# Patient Record
Sex: MALE | Race: WHITE | NOT HISPANIC OR LATINO | Employment: OTHER | URBAN - NONMETROPOLITAN AREA
[De-identification: names, ages, dates, MRNs, and addresses within clinical notes are randomized per-mention and may not be internally consistent; named-entity substitution may affect disease eponyms.]

---

## 2017-01-23 ENCOUNTER — OFFICE VISIT (OUTPATIENT)
Dept: MEDICAL GROUP | Facility: CLINIC | Age: 67
End: 2017-01-23
Payer: MEDICARE

## 2017-01-23 VITALS
OXYGEN SATURATION: 94 % | SYSTOLIC BLOOD PRESSURE: 150 MMHG | DIASTOLIC BLOOD PRESSURE: 92 MMHG | RESPIRATION RATE: 16 BRPM | HEIGHT: 72 IN | HEART RATE: 72 BPM | BODY MASS INDEX: 19.77 KG/M2 | WEIGHT: 146 LBS | TEMPERATURE: 98.4 F

## 2017-01-23 DIAGNOSIS — I67.2 CEREBRAL ATHEROSCLEROSIS: ICD-10-CM

## 2017-01-23 DIAGNOSIS — M54.12 CERVICAL RADICULOPATHY: ICD-10-CM

## 2017-01-23 DIAGNOSIS — Z72.0 TOBACCO ABUSE: ICD-10-CM

## 2017-01-23 PROCEDURE — 4040F PNEUMOC VAC/ADMIN/RCVD: CPT | Mod: 8P | Performed by: FAMILY MEDICINE

## 2017-01-23 PROCEDURE — 4004F PT TOBACCO SCREEN RCVD TLK: CPT | Performed by: FAMILY MEDICINE

## 2017-01-23 PROCEDURE — 1101F PT FALLS ASSESS-DOCD LE1/YR: CPT | Performed by: FAMILY MEDICINE

## 2017-01-23 PROCEDURE — G8432 DEP SCR NOT DOC, RNG: HCPCS | Performed by: FAMILY MEDICINE

## 2017-01-23 PROCEDURE — G8419 CALC BMI OUT NRM PARAM NOF/U: HCPCS | Performed by: FAMILY MEDICINE

## 2017-01-23 PROCEDURE — 3017F COLORECTAL CA SCREEN DOC REV: CPT | Mod: 8P | Performed by: FAMILY MEDICINE

## 2017-01-23 PROCEDURE — G8484 FLU IMMUNIZE NO ADMIN: HCPCS | Performed by: FAMILY MEDICINE

## 2017-01-23 PROCEDURE — 99214 OFFICE O/P EST MOD 30 MIN: CPT | Performed by: FAMILY MEDICINE

## 2017-01-23 RX ORDER — LOVASTATIN 20 MG/1
20 TABLET ORAL DAILY
Qty: 30 TAB | Refills: 6 | Status: SHIPPED | OUTPATIENT
Start: 2017-01-23 | End: 2020-02-10

## 2017-01-23 ASSESSMENT — ENCOUNTER SYMPTOMS
GASTROINTESTINAL NEGATIVE: 1
NECK PAIN: 0
COUGH: 0
CARDIOVASCULAR NEGATIVE: 1
CONSTITUTIONAL NEGATIVE: 1
FEVER: 0
HEADACHES: 0
MUSCULOSKELETAL NEGATIVE: 1
DIZZINESS: 0
CONSTIPATION: 0
PSYCHIATRIC NEGATIVE: 1
PALPITATIONS: 0
RESPIRATORY NEGATIVE: 1
EYES NEGATIVE: 1
HEMOPTYSIS: 0
FOCAL WEAKNESS: 1
MYALGIAS: 0
CHILLS: 0

## 2017-01-23 NOTE — MR AVS SNAPSHOT
Kobe Morris   2017 1:30 PM   Office Visit   MRN: [id number]    Department:  [facility] Phone:  [phone]    Description:  Male : 1950   Provider:  Bryce Carter M.D.           Reason for Visit     Results MRI of chest, brain, spinal cord       Allergies as of 2017     No Known Allergies      You were diagnosed with     Cervical radiculopathy   [212828]       Cerebral atherosclerosis   [437.0.ICD-9-CM]       Tobacco abuse   [651361]         Vital Signs     Blood Pressure Pulse Temperature Respirations Height Weight    150/92 mmHg 72 36.9 °C (98.4 °F) 16 1.829 m (6') 66.225 kg (146 lb)    Body Mass Index Oxygen Saturation Smoking Status             19.80 kg/m2 94% Current Every Day Smoker         Basic Information     Date Of Birth Sex Race Ethnicity Preferred Language    1950 Male White Non- English      Problem List              ICD-10-CM Priority Class Noted - Resolved    Tobacco abuse Z72.0   Unknown - Present      Health Maintenance        Date Due Completion Dates    IMM DTaP/Tdap/Td Vaccine (1 - Tdap) 1970 ---    COLONOSCOPY 2001 ---    IMM ZOSTER VACCINE 2011 ---    IMM PNEUMOCOCCAL 65+ (ADULT) LOW/MEDIUM RISK SERIES (1 of 2 - PCV13) 2016 ---    IMM INFLUENZA (1) 2016 ---            Current Immunizations     No immunizations on file.      Below and/or attached are the medications your provider expects you to take. Review all of your home medications and newly ordered medications with your provider and/or pharmacist. Follow medication instructions as directed by your provider and/or pharmacist. Please keep your medication list with you and share with your provider. Update the information when medications are discontinued, doses are changed, or new medications (including over-the-counter products) are added; and carry medication information at all times in the event of emergency situations     Allergies:  No Known Allergies          Medications  Valid as of: January 23, 2017 -  3:20 PM    Generic Name Brand Name Tablet Size Instructions for use    Aspirin (Tablet Delayed Response) ECOTRIN 325 MG Take 1 Tab by mouth every day.        Azithromycin (Tab) ZITHROMAX 250 MG Take 2 tablets by mouth on day one. Take one tablet by mouth the remaining days until gone        Clarithromycin (Tab) BIAXIN 500 MG Take 1 Tab by mouth 2 times a day.        Lovastatin (Tab) MEVACOR 20 MG Take 1 Tab by mouth every day.        MethylPREDNISolone (Tab) MEDROL DOSPACK 4 MG Take  by mouth. Use as directed        Varenicline Tartrate (Misc) CHANTIX MI 0.5 MG X 11 & 1 MG X 42 As dir        .                 Medicines prescribed today were sent to:     [facility] - [place], [place] - [address] [place] [address]    [address] [facility] [address] [facility] [address]    Phone: [phone] Fax: [phone]    Open 24 Hours?: No      Medication refill instructions:       If your prescription bottle indicates you have medication refills left, it is not necessary to call your provider’s office. Please contact your pharmacy and they will refill your medication.    If your prescription bottle indicates you do not have any refills left, you may request refills at any time through one of the following ways: The online Cause.it system (except Urgent Care), by calling your provider’s office, or by asking your pharmacy to contact your provider’s office with a refill request. Medication refills are processed only during regular business hours and may not be available until the next business day. Your provider may request additional information or to have a follow-up visit with you prior to refilling your medication.   *Please Note: Medication refills are assigned a new Rx number when refilled electronically. Your pharmacy may indicate that no refills were authorized even though a new prescription for the same medication is available at the pharmacy. Please request the medicine by  name with the pharmacy before contacting your provider for a refill.        Referral     A referral request has been sent to our patient care coordination department. Please allow 3-5 business days for us to process this request and contact you either by phone or mail. If you do not hear from us by the 5th business day, please call us at [phone].           KSE Access Code: [email or web address]  Expires: 1/27/2017  9:02 AM    KSE  A secure, online tool to manage your health information     [employer]’s KSE® is a secure, online tool that connects you to your personalized health information from the privacy of your home -- day or night - making it very easy for you to manage your healthcare. Once the activation process is completed, you can even access your medical information using the KSE prince, which is available for free in the Apple Prince store or Google Play store.     KSE provides the following levels of access (as shown below):   My Chart Features   [facility] Primary Care Doctor [facility]  Specialists [facility]  [facility]  Primary Care  Doctor   Email your healthcare team securely and privately 24/7 X X X    Manage appointments: schedule your next appointment; view details of past/upcoming appointments X      Request prescription refills. X      View recent personal medical records, including lab and immunizations X X X X   View health record, including health history, allergies, medications X X X X   Read reports about your outpatient visits, procedures, consult and ER notes X X X X   See your discharge summary, which is a recap of your hospital and/or ER visit that includes your diagnosis, lab results, and care plan. X X       How to register for KSE:  1. Go to  [email or web address].  2. Click on the Sign Up Now box, which takes you to the New Member Sign Up page. You will need to provide the following information:  a. Enter your KSE Access Code exactly as it appears  at the top of this page. (You will not need to use this code after you’ve completed the sign-up process. If you do not sign up before the expiration date, you must request a new code.)   b. Enter your date of birth.   c. Enter your home email address.   d. Click Submit, and follow the next screen’s instructions.  3. Create a Pairin ID. This will be your Pairin login ID and cannot be changed, so think of one that is secure and easy to remember.  4. Create a Pairin password. You can change your password at any time.  5. Enter your Password Reset Question and Answer. This can be used at a later time if you forget your password.   6. Enter your e-mail address. This allows you to receive e-mail notifications when new information is available in Pairin.  7. Click Sign Up. You can now view your health information.    For assistance activating your Pairin account, call [phone]

## 2017-01-23 NOTE — PROGRESS NOTES
Subjective:      Kobe Morris is a 66 y.o. male who presents with Results            HPI Comments: 1. Cervical radiculopathy  Weakness in arm explained by spinal stenosis and hnp in mri will send to pmr for eval  - REFERRAL TO PHYSIATRY (PMR)    2. Cerebral atherosclerosis  Seen on mri, due to smoking, will start statin but needs to stop smoking  - lovastatin (MEVACOR) 20 MG Tab; Take 1 Tab by mouth every day.  Dispense: 30 Tab; Refill: 6  - aspirin EC (ECOTRIN) 325 MG Tablet Delayed Response; Take 1 Tab by mouth every day.  Dispense: 30 Tab; Refill: 6    3. Tobacco abuse  Patient is still currently using tobacco. They were counseled on the importance of cessation and various behavioural and pharmacological ways of achieving this.  UNCONTROLLED  Will try chantix    Past Medical History:    Tobacco abuse                                               History reviewed. No pertinent surgical history.    Smoking Status: Current Every Day Smoker        Packs/Day: 1.00  Years: 45         Types: Cigarettes    Smokeless Status: Never Used                        Alcohol Use: Yes           1.0 oz/week       2 Cans of beer per week    Review of patient's family history indicates:    Cancer                         Father                      Current outpatient prescriptions: •  lovastatin (MEVACOR) 20 MG Tab, Take 1 Tab by mouth every day., Disp: 30 Tab, Rfl: 6•  aspirin EC (ECOTRIN) 325 MG Tablet Delayed Response, Take 1 Tab by mouth every day., Disp: 30 Tab, Rfl: 6•  varenicline (CHANTIX STARTING MONTH PAK) 0.5 MG X 11 & 1 MG X 42 tablet, As dir, Disp: 56 Tab, Rfl: 3•  azithromycin (ZITHROMAX) 250 MG Tab, Take 2 tablets by mouth on day one. Take one tablet by mouth the remaining days until gone (Patient not taking: Reported on 12/19/2016), Disp: 6 Tab, Rfl: 0•  clarithromycin (BIAXIN) 500 MG TABS, Take 1 Tab by mouth 2 times a day. (Patient not taking: Reported on 12/19/2016), Disp: 20 Each, Rfl: 0•  methylPREDNISolone (MEDROL  DOSPACK) 4 MG TABS, Take  by mouth. Use as directed (Patient not taking: Reported on 12/19/2016), Disp: 1 Each, Rfl: 0    Assessment/plan: diagnoses listed as above in problem list. Patient will continue with current medications/diet/lifestyle modifications    Patient will continue with current medication regimen, advised on taking meds every day, f/u in 3 mo.            Review of Systems   Constitutional: Negative.  Negative for fever and chills.        Past Medical History:    Tobacco abuse                                               History reviewed. No pertinent surgical history.    Smoking Status: Current Every Day Smoker        Packs/Day: 1.00  Years: 45        Types: Cigarettes    Smokeless Status: Never Used                        Alcohol Use: Yes           1.0 oz/week       2 Cans of beer per week    Review of patient's family history indicates:    Cancer                         Father                     HENT: Negative.    Eyes: Negative.    Respiratory: Negative.  Negative for cough and hemoptysis.    Cardiovascular: Negative.  Negative for chest pain and palpitations.   Gastrointestinal: Negative.  Negative for constipation.   Genitourinary: Negative.  Negative for dysuria and urgency.   Musculoskeletal: Negative.  Negative for myalgias and neck pain.   Skin: Negative.  Negative for rash.   Neurological: Positive for focal weakness. Negative for dizziness and headaches.   Endo/Heme/Allergies: Negative.    Psychiatric/Behavioral: Negative.  Negative for suicidal ideas.          Objective:     /92 mmHg  Pulse 72  Temp(Src) 36.9 °C (98.4 °F)  Resp 16  Ht 1.829 m (6')  Wt 66.225 kg (146 lb)  BMI 19.80 kg/m2  SpO2 94%     Physical Exam   Constitutional: He is oriented to person, place, and time. No distress.   HENT:   Head: Normocephalic and atraumatic.   Right Ear: External ear normal.   Left Ear: External ear normal.   Nose: Nose normal.   Mouth/Throat: Oropharynx is clear and moist. No  oropharyngeal exudate.   Eyes: Pupils are equal, round, and reactive to light. Right eye exhibits no discharge. Left eye exhibits no discharge. No scleral icterus.   Neck: Normal range of motion. Neck supple. No JVD present. No tracheal deviation present. No thyromegaly present.   Cardiovascular: Normal rate, regular rhythm, normal heart sounds and intact distal pulses.  Exam reveals no gallop and no friction rub.    No murmur heard.  Pulmonary/Chest: Effort normal and breath sounds normal. No stridor. No respiratory distress. He has no wheezes. He has no rales. He exhibits no tenderness.   Abdominal: Soft. He exhibits no distension. There is no tenderness.   Musculoskeletal:   Left upper arm weakness present   Lymphadenopathy:     He has no cervical adenopathy.   Neurological: He is alert and oriented to person, place, and time.   Skin: Skin is warm and dry. He is not diaphoretic.   Psychiatric: Judgment normal.   Nursing note and vitals reviewed.              Assessment/Plan:     1. Cervical radiculopathy    - REFERRAL TO PHYSIATRY (PMR)    2. Cerebral atherosclerosis    - lovastatin (MEVACOR) 20 MG Tab; Take 1 Tab by mouth every day.  Dispense: 30 Tab; Refill: 6  - aspirin EC (ECOTRIN) 325 MG Tablet Delayed Response; Take 1 Tab by mouth every day.  Dispense: 30 Tab; Refill: 6    3. Tobacco abuse

## 2017-03-08 ENCOUNTER — OFFICE VISIT (OUTPATIENT)
Dept: URGENT CARE | Facility: PHYSICIAN GROUP | Age: 67
End: 2017-03-08
Payer: MEDICARE

## 2017-03-08 VITALS
SYSTOLIC BLOOD PRESSURE: 138 MMHG | WEIGHT: 144.4 LBS | HEART RATE: 84 BPM | TEMPERATURE: 98.8 F | RESPIRATION RATE: 12 BRPM | OXYGEN SATURATION: 96 % | BODY MASS INDEX: 19.58 KG/M2 | DIASTOLIC BLOOD PRESSURE: 70 MMHG

## 2017-03-08 DIAGNOSIS — J22 LOWER RESP. TRACT INFECTION: ICD-10-CM

## 2017-03-08 DIAGNOSIS — R06.2 WHEEZE: ICD-10-CM

## 2017-03-08 PROCEDURE — 3017F COLORECTAL CA SCREEN DOC REV: CPT | Mod: 8P | Performed by: PHYSICIAN ASSISTANT

## 2017-03-08 PROCEDURE — 4040F PNEUMOC VAC/ADMIN/RCVD: CPT | Mod: 8P | Performed by: PHYSICIAN ASSISTANT

## 2017-03-08 PROCEDURE — 99214 OFFICE O/P EST MOD 30 MIN: CPT | Performed by: PHYSICIAN ASSISTANT

## 2017-03-08 PROCEDURE — G8419 CALC BMI OUT NRM PARAM NOF/U: HCPCS | Performed by: PHYSICIAN ASSISTANT

## 2017-03-08 PROCEDURE — G8432 DEP SCR NOT DOC, RNG: HCPCS | Performed by: PHYSICIAN ASSISTANT

## 2017-03-08 PROCEDURE — 1101F PT FALLS ASSESS-DOCD LE1/YR: CPT | Performed by: PHYSICIAN ASSISTANT

## 2017-03-08 PROCEDURE — G8484 FLU IMMUNIZE NO ADMIN: HCPCS | Performed by: PHYSICIAN ASSISTANT

## 2017-03-08 PROCEDURE — 4004F PT TOBACCO SCREEN RCVD TLK: CPT | Performed by: PHYSICIAN ASSISTANT

## 2017-03-08 RX ORDER — METHYLPREDNISOLONE 4 MG/1
TABLET ORAL
Qty: 21 TAB | Refills: 0 | Status: SHIPPED | OUTPATIENT
Start: 2017-03-08 | End: 2017-05-30

## 2017-03-08 RX ORDER — AZITHROMYCIN 250 MG/1
TABLET, FILM COATED ORAL
Qty: 6 TAB | Refills: 0 | Status: SHIPPED | OUTPATIENT
Start: 2017-03-08 | End: 2017-05-30

## 2017-03-08 RX ORDER — CODEINE PHOSPHATE AND GUAIFENESIN 10; 100 MG/5ML; MG/5ML
5 SOLUTION ORAL NIGHTLY PRN
Qty: 120 ML | Refills: 0 | Status: SHIPPED | OUTPATIENT
Start: 2017-03-08 | End: 2017-07-26

## 2017-03-08 NOTE — MR AVS SNAPSHOT
Kobe Morris   3/8/2017 10:20 AM   Office Visit   MRN: [id number]    Department:  [facility]   Dept Phone:  [phone]    Description:  Male : 1950   Provider:  Cristina Rodriguez PA-C           Reason for Visit     Cough           Allergies as of 3/8/2017     No Known Allergies      You were diagnosed with     Lower resp. tract infection   [265039]       Wheeze   [591266]         Vital Signs     Blood Pressure Pulse Temperature Respirations Weight Oxygen Saturation    138/70 mmHg 84 37.1 °C (98.8 °F) 12 65.499 kg (144 lb 6.4 oz) 96%    Smoking Status                   Current Every Day Smoker           Basic Information     Date Of Birth Sex Race Ethnicity Preferred Language    1950 Male White Non- English      Your appointments     May 30, 2017 10:15 AM   New Patient with Gabino Marcus M.D.   [facility]YSIAT[facility] (--)    [address]  [place] [id number] [phone]           Please bring Photo ID, Insurance Cards, All Medication Bottles and copies of any legal documents (such as Living Will, Power of ) If speaking a language besides English please bring an adult . Please arrive 30 minutes prior for check in and registration. You will be receiving a confirmation call a few days before your appointment from our automated call confirmation system.              Problem List              ICD-10-CM Priority Class Noted - Resolved    Tobacco abuse Z72.0   Unknown - Present      Health Maintenance        Date Due Completion Dates    IMM DTaP/Tdap/Td Vaccine (1 - Tdap) 1970 ---    COLONOSCOPY 2001 ---    IMM ZOSTER VACCINE 2011 ---    IMM PNEUMOCOCCAL 65+ (ADULT) LOW/MEDIUM RISK SERIES (1 of 2 - PCV13) 2016 ---    IMM INFLUENZA (1) 2016 ---            Current Immunizations     No immunizations on file.      Below and/or attached are the medications your provider expects you to take. Review all of your home  medications and newly ordered medications with your provider and/or pharmacist. Follow medication instructions as directed by your provider and/or pharmacist. Please keep your medication list with you and share with your provider. Update the information when medications are discontinued, doses are changed, or new medications (including over-the-counter products) are added; and carry medication information at all times in the event of emergency situations     Allergies:  No Known Allergies          Medications  Valid as of: March 08, 2017 -  1:51 PM    Generic Name Brand Name Tablet Size Instructions for use    Aspirin (Tablet Delayed Response) ECOTRIN 325 MG Take 1 Tab by mouth every day.        Azithromycin (Tab) ZITHROMAX 250 MG Take 2 tablets by mouth on day one. Take one tablet by mouth the remaining days until gone        Azithromycin (Tab) ZITHROMAX 250 MG Use as package directs        Clarithromycin (Tab) BIAXIN 500 MG Take 1 Tab by mouth 2 times a day.        Guaifenesin-Codeine (Solution) ROBITUSSIN -10 mg/5mL Take 5 mL by mouth at bedtime as needed for Cough.        Lovastatin (Tab) MEVACOR 20 MG Take 1 Tab by mouth every day.        MethylPREDNISolone (Tab) MEDROL DOSPACK 4 MG Take  by mouth. Use as directed        MethylPREDNISolone (Tablet Therapy Pack) MEDROL DOSEPAK 4 MG Use as package directs        Varenicline Tartrate (Misc) CHANTIX MI 0.5 MG X 11 & 1 MG X 42 As dir        .                 Medicines prescribed today were sent to:     [facility] [address] [place] [address] - [address] [place] [address]    Phone: [phone] Fax: [phone]    Open 24 Hours?: No      Medication refill instructions:       If your prescription bottle indicates you have medication refills left, it is not necessary to call your provider’s office. Please contact your pharmacy and they will refill your medication.    If your prescription bottle indicates you do not have  any refills left, you may request refills at any time through one of the following ways: The online NetManage system (except Urgent Care), by calling your provider’s office, or by asking your pharmacy to contact your provider’s office with a refill request. Medication refills are processed only during regular business hours and may not be available until the next business day. Your provider may request additional information or to have a follow-up visit with you prior to refilling your medication.   *Please Note: Medication refills are assigned a new Rx number when refilled electronically. Your pharmacy may indicate that no refills were authorized even though a new prescription for the same medication is available at the pharmacy. Please request the medicine by name with the pharmacy before contacting your provider for a refill.           NetManage Access Code: [email or web address]  Expires: 3/26/2017 10:30 AM    NetManage  A secure, online tool to manage your health information     [employer]’s NetManage® is a secure, online tool that connects you to your personalized health information from the privacy of your home -- day or night - making it very easy for you to manage your healthcare. Once the activation process is completed, you can even access your medical information using the NetManage prince, which is available for free in the Apple Prince store or Google Play store.     NetManage provides the following levels of access (as shown below):   My Chart Features   [facility] Primary Care Doctor [facility]  Specialists [facility]  Urgent  Care Non-[facility]  Primary Care  Doctor   Email your healthcare team securely and privately 24/7 X X X    Manage appointments: schedule your next appointment; view details of past/upcoming appointments X      Request prescription refills. X      View recent personal medical records, including lab and immunizations X X X X   View health record, including health history, allergies, medications X X X X   Read  reports about your outpatient visits, procedures, consult and ER notes X X X X   See your discharge summary, which is a recap of your hospital and/or ER visit that includes your diagnosis, lab results, and care plan. X X       How to register for [email or web address]:  1. Go to  [email or web address].  2. Click on the Sign Up Now box, which takes you to the New Member Sign Up page. You will need to provide the following information:  a. Enter your Lovestruck.com Access Code exactly as it appears at the top of this page. (You will not need to use this code after you’ve completed the sign-up process. If you do not sign up before the expiration date, you must request a new code.)   b. Enter your date of birth.   c. Enter your home email address.   d. Click Submit, and follow the next screen’s instructions.  3. Create a Lovestruck.com ID. This will be your Lovestruck.com login ID and cannot be changed, so think of one that is secure and easy to remember.  4. Create a Lovestruck.com password. You can change your password at any time.  5. Enter your Password Reset Question and Answer. This can be used at a later time if you forget your password.   6. Enter your e-mail address. This allows you to receive e-mail notifications when new information is available in Lovestruck.com.  7. Click Sign Up. You can now view your health information.    For assistance activating your Lovestruck.com account, call [phone]        Quit Tobacco Information     Do you want to quit using tobacco?    Quitting tobacco decreases risks of cancer, heart and lung disease, increases life expectancy, improves sense of taste and smell, and increases spending money, among other benefits.    If you are thinking about quitting, we can help.  • [facility] Quit Tobacco Program: 772.338.1940  o Program occurs weekly for four weeks and includes pharmacist consultation on products to support quitting smoking or chewing tobacco. A provider referral is needed for pharmacist consultation.  • Tobacco Users  Help Hotline: -800-QUIT-NOW (019-0271) or https://[place].quitlogix.org/  o Free, confidential telephone and online coaching for [place] residents. Sessions are designed on a schedule that is convenient for you. Eligible clients receive free nicotine replacement therapy.  • Nationally: www.smokefree.gov  o Information and professional assistance to support both immediate and long-term needs as you become, and remain, a non-smoker. Smokefree.gov allows you to choose the help that best fits your needs.

## 2017-03-08 NOTE — PROGRESS NOTES
Chief Complaint   Patient presents with   • Cough       HISTORY OF PRESENT ILLNESS: Patient is a 66 y.o. male who presents today for evaluation of a productive cough for the last week to 10 days. Patient reports shortness of breath and occasional chills but denies overt fever. He reports mild nasal congestion but denies headache, ear pain, sore throat, nausea, vomiting. He has not taken any over-the-counter medication. He has had a difficult time sleeping because of the cough. He has been a 2 pack per day smoker for most of his life but is trying to quit and is down to 4 cigarettes a day.    Patient Active Problem List    Diagnosis Date Noted   • Tobacco abuse        Allergies:Review of patient's allergies indicates no known allergies.    Current Outpatient Prescriptions Ordered in [facility]   Medication Sig Dispense Refill   • azithromycin (ZITHROMAX) 250 MG Tab Use as package directs 6 Tab 0   • guaifenesin-codeine (ROBITUSSIN AC) Solution oral solution Take 5 mL by mouth at bedtime as needed for Cough. 120 mL 0   • MethylPREDNISolone (MEDROL DOSEPAK) 4 MG Tablet Therapy Pack Use as package directs 21 Tab 0   • lovastatin (MEVACOR) 20 MG Tab Take 1 Tab by mouth every day. 30 Tab 6   • aspirin EC (ECOTRIN) 325 MG Tablet Delayed Response Take 1 Tab by mouth every day. 30 Tab 6   • varenicline (CHANTIX STARTING MONTH PAK) 0.5 MG X 11 & 1 MG X 42 tablet As dir 56 Tab 3   • azithromycin (ZITHROMAX) 250 MG Tab Take 2 tablets by mouth on day one. Take one tablet by mouth the remaining days until gone (Patient not taking: Reported on 12/19/2016) 6 Tab 0   • clarithromycin (BIAXIN) 500 MG TABS Take 1 Tab by mouth 2 times a day. (Patient not taking: Reported on 12/19/2016) 20 Each 0   • methylPREDNISolone (MEDROL DOSPACK) 4 MG TABS Take  by mouth. Use as directed (Patient not taking: Reported on 12/19/2016) 1 Each 0     No current [facility]-ordered facility-administered medications on file.       Past Medical History   Diagnosis Date    • Tobacco abuse        Social History   Substance Use Topics   • Smoking status: Current Every Day Smoker -- 1.00 packs/day for 45 years     Types: Cigarettes   • Smokeless tobacco: Never Used   • Alcohol Use: 1.0 oz/week     2 Cans of beer per week       Family Status   Relation Status Death Age   • Mother Alive    • Father     • Brother Alive      Family History   Problem Relation Age of Onset   • Cancer Father        ROS:   Review of Systems   Constitutional: Negative for fever, chills, weight loss and malaise/fatigue.   HENT: Negative for ear pain, nosebleeds, congestion, sore throat and neck pain.    Eyes: Negative for blurred vision.   Respiratory: Positive for cough, sputum production, shortness of breath and wheezing.    Cardiovascular: Negative for chest pain, palpitations, orthopnea and leg swelling.   Gastrointestinal: Negative for heartburn, nausea, vomiting and abdominal pain.   Genitourinary: Negative for dysuria, urgency and frequency.       Exam:  Blood pressure 138/70, pulse 84, temperature 37.1 °C (98.8 °F), resp. rate 12, weight 65.499 kg (144 lb 6.4 oz), SpO2 96 %.  General: Normal appearing. No distress.  HEENT: Conjunctiva clear, lids without ptosis, ears normal shape and contour, canals are clear bilaterally, tympanic membranes are benign, nasal mucosa benign, oropharynx is without erythema, edema or exudates.  Pulmonary: Diffuse inspiratory and expiratory wheezes.  Cardiovascular: Regular rate and rhythm without murmur.   Neurologic: Grossly nonfocal.  Lymph: No cervical lymphadenopathy noted.  Skin: No obvious lesions.  Psych: Normal mood. Alert and oriented x3. Judgment and insight is normal.    Declines nebulizer treatment    Assessment/Plan:  Discussed likely viral etiology but have advised starting antibiotics if his symptoms worsen after starting steroids or if they fail to improve after another week to 10 days. Discussed appropriate over-the-counter symptomatic medication,  and when to return to clinic. Contingent antibiotic prescription given to patient to fill upon meeting criteria of guidelines discussed.  Take all medications as directed. Follow up for worsening or persistent symptoms.  1. Lower resp. tract infection  azithromycin (ZITHROMAX) 250 MG Tab    guaifenesin-codeine (ROBITUSSIN AC) Solution oral solution   2. Wheeze  MethylPREDNISolone (MEDROL DOSEPAK) 4 MG Tablet Therapy Pack

## 2017-03-30 ENCOUNTER — OFFICE VISIT (OUTPATIENT)
Dept: MEDICAL GROUP | Facility: PHYSICIAN GROUP | Age: 67
End: 2017-03-30
Payer: MEDICARE

## 2017-03-30 VITALS
TEMPERATURE: 99.7 F | RESPIRATION RATE: 12 BRPM | SYSTOLIC BLOOD PRESSURE: 120 MMHG | OXYGEN SATURATION: 95 % | WEIGHT: 142 LBS | HEART RATE: 104 BPM | BODY MASS INDEX: 19.23 KG/M2 | HEIGHT: 72 IN | DIASTOLIC BLOOD PRESSURE: 80 MMHG

## 2017-03-30 DIAGNOSIS — J44.1 CHRONIC OBSTRUCTIVE PULMONARY DISEASE WITH ACUTE EXACERBATION (HCC): ICD-10-CM

## 2017-03-30 DIAGNOSIS — I67.89 CEREBRAL MICROVASCULAR DISEASE: ICD-10-CM

## 2017-03-30 DIAGNOSIS — K02.9 DENTAL CARIES: ICD-10-CM

## 2017-03-30 DIAGNOSIS — M48.9 CERVICAL SPINE DISEASE: ICD-10-CM

## 2017-03-30 DIAGNOSIS — Z72.0 TOBACCO ABUSE: ICD-10-CM

## 2017-03-30 PROCEDURE — 4004F PT TOBACCO SCREEN RCVD TLK: CPT | Performed by: INTERNAL MEDICINE

## 2017-03-30 PROCEDURE — 4040F PNEUMOC VAC/ADMIN/RCVD: CPT | Mod: 8P | Performed by: INTERNAL MEDICINE

## 2017-03-30 PROCEDURE — G8484 FLU IMMUNIZE NO ADMIN: HCPCS | Performed by: INTERNAL MEDICINE

## 2017-03-30 PROCEDURE — 3017F COLORECTAL CA SCREEN DOC REV: CPT | Mod: 8P | Performed by: INTERNAL MEDICINE

## 2017-03-30 PROCEDURE — 99204 OFFICE O/P NEW MOD 45 MIN: CPT | Performed by: INTERNAL MEDICINE

## 2017-03-30 PROCEDURE — G8432 DEP SCR NOT DOC, RNG: HCPCS | Performed by: INTERNAL MEDICINE

## 2017-03-30 PROCEDURE — G8420 CALC BMI NORM PARAMETERS: HCPCS | Performed by: INTERNAL MEDICINE

## 2017-03-30 PROCEDURE — 1101F PT FALLS ASSESS-DOCD LE1/YR: CPT | Performed by: INTERNAL MEDICINE

## 2017-03-30 RX ORDER — IPRATROPIUM BROMIDE AND ALBUTEROL SULFATE 2.5; .5 MG/3ML; MG/3ML
3 SOLUTION RESPIRATORY (INHALATION) EVERY 6 HOURS PRN
Qty: 120 VIAL | Refills: 5 | Status: SHIPPED | OUTPATIENT
Start: 2017-03-30 | End: 2017-03-30 | Stop reason: SDUPTHER

## 2017-03-30 RX ORDER — AMOXICILLIN AND CLAVULANATE POTASSIUM 875; 125 MG/1; MG/1
1 TABLET, FILM COATED ORAL 2 TIMES DAILY
Qty: 20 TAB | Refills: 0 | Status: SHIPPED | OUTPATIENT
Start: 2017-03-30 | End: 2017-05-30

## 2017-03-30 RX ORDER — IPRATROPIUM BROMIDE AND ALBUTEROL SULFATE 2.5; .5 MG/3ML; MG/3ML
3 SOLUTION RESPIRATORY (INHALATION) ONCE
Status: COMPLETED | OUTPATIENT
Start: 2017-03-30 | End: 2017-03-30

## 2017-03-30 RX ORDER — IPRATROPIUM BROMIDE AND ALBUTEROL SULFATE 2.5; .5 MG/3ML; MG/3ML
3 SOLUTION RESPIRATORY (INHALATION) EVERY 6 HOURS PRN
Qty: 120 VIAL | Refills: 5 | Status: SHIPPED
Start: 2017-03-30 | End: 2017-04-05 | Stop reason: SDUPTHER

## 2017-03-30 RX ADMIN — IPRATROPIUM BROMIDE AND ALBUTEROL SULFATE 3 ML: 2.5; .5 SOLUTION RESPIRATORY (INHALATION) at 11:53

## 2017-03-30 ASSESSMENT — PAIN SCALES - GENERAL: PAINLEVEL: NO PAIN

## 2017-03-30 NOTE — PATIENT INSTRUCTIONS
Stop smoking  chantix  augmentin 875 mg 1 2 x a day    duoneb treatments 2-4 x a day if sob    Cancer screening program.

## 2017-03-30 NOTE — PROGRESS NOTES
Chief Complaint   Patient presents with   • Cough     pt states cough, chest congestion and tighness, tired x1.5 months        HISTORY OF PRESENT ILLNESS: Patient is a 66 y.o. male established, new to me, patient who presents today to discuss the medical issues below.    Chronic obstructive pulmonary disease with acute exacerbation (CMS-HCC)  Patient here sob, cough, productive of globs of yellow, x about 1 mo, having fever and chills, not taking the temp.  Sleeping more recently, the cough is making it hard to sleep.  Has had sob with laying down 2 days ago, better last night but did awaken with cough, able to get back to sleep.  Seen in  3/8/17 rx zithromax, medrol dose eldon, states cough improved a bit but not a lot and has exacerbated.  Chest x-ray done in January positive for COPD. Negative infiltrate no masses. Recommendation for consideration for lung cancer screening made at that time. Patient reports over the last 4-5 years gradual and significant weight loss. He attributes this to the death of his wife. He is having night sweats as well currently but not prior to this recent exacerbation.     Tobacco abuse  Patient with 50 years or more of smoking, trying to cut back, down to 1/2 ppd.  Smokes when anxious, since age 16    Dental caries  Patient denies tooth pain.      Cervical spine disease  MRI positive multiple level disease, patient complains of left arm numbness but neg for neck pain, no weakness.     Cerebral microvascular disease  Patient has had MRI of the brain positive for microvascular disease. He currently is taking the aspirin 325 mg one a day and lovastatin 20 mg a day. He has no neuropathy complaints except for the left arm numbness.      Patient Active Problem List    Diagnosis Date Noted   • Chronic obstructive pulmonary disease with acute exacerbation (CMS-HCC) 03/30/2017   • Dental caries 03/30/2017   • Cervical spine disease 03/30/2017   • Cerebral microvascular disease 03/30/2017   •  Tobacco abuse        Allergies:Review of patient's allergies indicates no known allergies.    Current Outpatient Prescriptions   Medication Sig Dispense Refill   • amoxicillin-clavulanate (AUGMENTIN) 875-125 MG Tab Take 1 Tab by mouth 2 times a day. 20 Tab 0   • ipratropium-albuterol (DUONEB) 0.5-2.5 (3) MG/3ML nebulizer solution 3 mL by Nebulization route every 6 hours as needed for Shortness of Breath. 120 Vial 5   • lovastatin (MEVACOR) 20 MG Tab Take 1 Tab by mouth every day. 30 Tab 6   • aspirin EC (ECOTRIN) 325 MG Tablet Delayed Response Take 1 Tab by mouth every day. 30 Tab 6   • azithromycin (ZITHROMAX) 250 MG Tab Use as package directs (Patient not taking: Reported on 3/30/2017) 6 Tab 0   • guaifenesin-codeine (ROBITUSSIN AC) Solution oral solution Take 5 mL by mouth at bedtime as needed for Cough. (Patient not taking: Reported on 3/30/2017) 120 mL 0   • MethylPREDNISolone (MEDROL DOSEPAK) 4 MG Tablet Therapy Pack Use as package directs (Patient not taking: Reported on 3/30/2017) 21 Tab 0   • varenicline (CHANTIX STARTING MONTH PAK) 0.5 MG X 11 & 1 MG X 42 tablet As dir 56 Tab 3   • azithromycin (ZITHROMAX) 250 MG Tab Take 2 tablets by mouth on day one. Take one tablet by mouth the remaining days until gone (Patient not taking: Reported on 12/19/2016) 6 Tab 0   • clarithromycin (BIAXIN) 500 MG TABS Take 1 Tab by mouth 2 times a day. (Patient not taking: Reported on 12/19/2016) 20 Each 0   • methylPREDNISolone (MEDROL DOSPACK) 4 MG TABS Take  by mouth. Use as directed (Patient not taking: Reported on 12/19/2016) 1 Each 0     Current Facility-Administered Medications   Medication Dose Route Frequency Provider Last Rate Last Dose   • ipratropium-albuterol (DUONEB) nebulizer solution 3 mL  3 mL Nebulization Once Princess ELAM M.D.             Past Medical History   Diagnosis Date   • Tobacco abuse    • Chronic obstructive pulmonary disease with acute exacerbation (CMS-HCC) 3/30/2017   • Dental caries  3/30/2017   • Cervical spine disease 3/30/2017       Social History   Substance Use Topics   • Smoking status: Current Every Day Smoker -- 1.00 packs/day for 45 years     Types: Cigarettes   • Smokeless tobacco: Never Used   • Alcohol Use: 1.0 oz/week     2 Cans of beer per week       Family Status   Relation Status Death Age   • Mother Alive    • Father     • Brother Alive      Family History   Problem Relation Age of Onset   • Cancer Father      lung       ROS:    Cardiovascular: Negative for chest pain, palpitations, orthopnea, dyspnea with exertion or edema.   Gastrointestinal: Negative for GI upset, nausea, vomiting, abdominal pain, constipation or diarrhea.   Genitourinary: Negative for dysuria, urgency, hesitancy or frequency.       Exam:    Blood pressure 120/80, pulse 104, temperature 37.6 °C (99.7 °F), resp. rate 12, height 1.829 m (6'), weight 64.411 kg (142 lb), SpO2 95 %.  General: Slender, well developed male in NAD.  HENT: Normocephalic, bilateral TMs are intact, nasal and oral mucosa with no lesions,   Neck: Supple without bruit. Thyroid is not enlarged.  Pulmonary: Vesicular breath sounds moderately prolonged expiration right lung base with end expiratory wheezes no rhonchi rales  Cardiovascular: Regular rate and rhythm without murmur.   Abdomen: Normal bowel sounds soft and nontender no palpable liver spleen bladder mass.  Extremities: No LE edema noted.  Neuro: Grossly nonfocal.  Psych: Alert and oriented to person, place, and time. Appropriate mood and conversation.    Reviewed MRI cervical spine, brain, chest x-ray    This dictation was created using voice recognition software. I have made reasonable attempts to correct errors, however, errors of grammar and content may exist.          Assessment/Plan:    1. Chronic obstructive pulmonary disease with acute exacerbation (CMS-[facility])  Long discussion held most likely consistent with emphysema COPD. He did have good improvement with nebulizer  treatment here in the office now with fascicular breath sounds and no wheezes appreciated. X-ray is negative for any infiltrates done in January. Patient at high risk from the tobacco abuse referral to the lung cancer screening program. Considering the very poor dental condition will utilize an empiric course of antibiotics as Augmentin for anaerobic coverage. Monitor clinically, maximize air motion with DuoNeb discussed smoking implications.  - ipratropium-albuterol (DUONEB) nebulizer solution 3 mL; 3 mL by Nebulization route Once.  - DME NEBULIZER    2. Tobacco abuse  Patient did not utilize the Chantix out of concerns for the listed side effects. Long discussion held regarding indications of cigarette smoking, lung disease, use of Chantix signs and symptoms which would cause concern and he can discontinue the Chantix. Ultimately he opts for a trial Chantix to discontinue smoking.  - REFERRAL TO LUNG CANCER SCREENING PROGRAM    3. Dental caries  Very poor repair would recommend addressing with dentistry, monitor with course of antibiotics of Augmentin    4. Cervical spine disease  At baseline and consider additional intervention if continuing problematic. No neck pain primarily intermittent numbness    5. Cerebral microvascular disease  Discussed indications of the MRI positive for microvascular disease recommendations for ongoing statin aspirin and discontinuation of smoking.    Patient was seen for 45 minutes face to face of which more than 50% of the time was spent in counseling and coordination of care regarding the above problems.

## 2017-03-30 NOTE — ASSESSMENT & PLAN NOTE
Patient here sob, cough, productive of globs of yellow, x about 1 mo, having fever and chills, not taking the temp.  Sleeping more recently, the cough is making it hard to sleep.  Has had sob with laying down 2 days ago, better last night but did awaken with cough, able to get back to sleep.  Seen in  3/8/17 rx zithromax, medrol dose eldon, states cough improved a bit but not a lot and has exacerbated.  Chest x-ray done in January positive for COPD. Negative infiltrate no masses. Recommendation for consideration for lung cancer screening made at that time.

## 2017-03-30 NOTE — ASSESSMENT & PLAN NOTE
Patient with 50 years or more of smoking, trying to cut back, down to 1/2 ppd.  Smokes when anxious, since age 16

## 2017-03-30 NOTE — ASSESSMENT & PLAN NOTE
Patient has had MRI of the brain positive for microvascular disease. He currently is taking the aspirin 325 mg one a day and lovastatin 20 mg a day. He has no neuropathy complaints except for the left arm numbness.

## 2017-03-30 NOTE — ASSESSMENT & PLAN NOTE
MRI positive multiple level disease, patient complains of left arm numbness but neg for neck pain, no weakness.

## 2017-03-30 NOTE — MR AVS SNAPSHOT
Kobe Morris   3/30/2017 10:40 AM   Office Visit   MRN: [id number]    Department:  [facility]   Dept Phone:  [phone]    Description:  Male : 1950   Provider:  Princess ELAM M.D.           Reason for Visit     Cough pt states cough, chest congestion and tighness, tired x1.5 months       Allergies as of 3/30/2017     No Known Allergies      You were diagnosed with     Chronic obstructive pulmonary disease with acute exacerbation (CMS-[facility])   [734209]       Tobacco abuse   [596926]       Dental caries   [3144496]       Cervical spine disease   [808913]       Cerebral microvascular disease   [114828]         Vital Signs     Blood Pressure Pulse Temperature Respirations Height Weight    120/80 mmHg 104 37.6 °C (99.7 °F) 12 1.829 m (6') 64.411 kg (142 lb)    Body Mass Index Oxygen Saturation Smoking Status             19.25 kg/m2 95% Current Every Day Smoker         Basic Information     Date Of Birth Sex Race Ethnicity Preferred Language    1950 Male White Non- English      Your appointments     May 30, 2017 10:15 AM   New Patient with Gabino Marcus M.D.   [facility] (--)    [address]  [facility] [address] [phone]           Please bring Photo ID, Insurance Cards, All Medication Bottles and copies of any legal documents (such as Living Will, Power of ) If speaking a language besides English please bring an adult . Please arrive 30 minutes prior for check in and registration. You will be receiving a confirmation call a few days before your appointment from our automated call confirmation system.            2017  4:00 PM   Established Patient with Princess ELAM M.D.   [facility] (--)    [address]  [place] [address] [phone]           You will be receiving a confirmation call a few days before your appointment from our automated call confirmation system.              Problem List              ICD-10-CM Priority Class Noted - Resolved    Tobacco abuse Z72.0   Unknown - Present    Chronic obstructive pulmonary disease with acute exacerbation (CMS-HCC) J44.1   3/30/2017 - Present    Dental caries K02.9   3/30/2017 - Present    Cervical spine disease M48.9   3/30/2017 - Present    Cerebral microvascular disease I67.9   3/30/2017 - Present      Health Maintenance        Date Due Completion Dates    IMM DTaP/Tdap/Td Vaccine (1 - Tdap) 8/28/1970 ---    COLONOSCOPY 8/28/2001 ---    IMM ZOSTER VACCINE 8/28/2011 ---    IMM PNEUMOCOCCAL 65+ (ADULT) LOW/MEDIUM RISK SERIES (1 of 2 - PCV13) 8/28/2016 ---    IMM INFLUENZA (1) 9/1/2016 ---            Current Immunizations     No immunizations on file.      Below and/or attached are the medications your provider expects you to take. Review all of your home medications and newly ordered medications with your provider and/or pharmacist. Follow medication instructions as directed by your provider and/or pharmacist. Please keep your medication list with you and share with your provider. Update the information when medications are discontinued, doses are changed, or new medications (including over-the-counter products) are added; and carry medication information at all times in the event of emergency situations     Allergies:  No Known Allergies          Medications  Valid as of: March 30, 2017 - 11:49 AM    Generic Name Brand Name Tablet Size Instructions for use    Amoxicillin-Pot Clavulanate (Tab) AUGMENTIN 875-125 MG Take 1 Tab by mouth 2 times a day.        Aspirin (Tablet Delayed Response) ECOTRIN 325 MG Take 1 Tab by mouth every day.        Azithromycin (Tab) ZITHROMAX 250 MG Take 2 tablets by mouth on day one. Take one tablet by mouth the remaining days until gone        Azithromycin (Tab) ZITHROMAX 250 MG Use as package directs        Clarithromycin (Tab) BIAXIN 500 MG Take 1 Tab by mouth 2 times a day.        Guaifenesin-Codeine  (Solution) ROBITUSSIN -10 mg/5mL Take 5 mL by mouth at bedtime as needed for Cough.        Ipratropium-Albuterol (Solution) DUONEB 0.5-2.5 (3) MG/3ML 3 mL by Nebulization route every 6 hours as needed for Shortness of Breath.        Lovastatin (Tab) MEVACOR 20 MG Take 1 Tab by mouth every day.        MethylPREDNISolone (Tab) MEDROL DOSPACK 4 MG Take  by mouth. Use as directed        MethylPREDNISolone (Tablet Therapy Pack) MEDROL DOSEPAK 4 MG Use as package directs        Varenicline Tartrate (Misc) CHANTIX MI 0.5 MG X 11 & 1 MG X 42 As dir        .                 Medicines prescribed today were sent to:     [facility] [address] [facility] [address] [facility]    [address] [place] [address]    Phone: [phone] Fax: [phone]    Open 24 Hours?: No    [facility] [address] [place] [address]    Phone: [phone] Fax: [phone]    Open 24 Hours?: No      Medication refill instructions:       If your prescription bottle indicates you have medication refills left, it is not necessary to call your provider’s office. Please contact your pharmacy and they will refill your medication.    If your prescription bottle indicates you do not have any refills left, you may request refills at any time through one of the following ways: The online [employer] system (except Urgent Care), by calling your provider’s office, or by asking your pharmacy to contact your provider’s office with a refill request. Medication refills are processed only during regular business hours and may not be available until the next business day. Your provider may request additional information or to have a follow-up visit with you prior to refilling your medication.   *Please Note: Medication refills are assigned a new Rx number when refilled electronically. Your pharmacy may indicate that no refills were authorized even though a new prescription for the same  medication is available at the pharmacy. Please request the medicine by name with the pharmacy before contacting your provider for a refill.        Instructions    Stop smoking  chantix  augmentin 875 mg 1 2 x a day    duoneb treatments 2-4 x a day if sob    Cancer screening program.              MyChart Access Code: Activation code not generated  Current MyChart Status: Active          Quit Tobacco Information     Do you want to quit using tobacco?    Quitting tobacco decreases risks of cancer, heart and lung disease, increases life expectancy, improves sense of taste and smell, and increases spending money, among other benefits.    If you are thinking about quitting, we can help.  • [facility] Quit Tobacco Program: [phone]  o Program occurs weekly for four weeks and includes pharmacist consultation on products to support quitting smoking or chewing tobacco. A provider referral is needed for pharmacist consultation.  • Tobacco Users Help Hotline: 4-580-QUIT-NOW (578-9963) or https://[place].quitlogix.org/  o Free, confidential telephone and online coaching for [place] residents. Sessions are designed on a schedule that is convenient for you. Eligible clients receive free nicotine replacement therapy.  • Nationally: www.smokefree.gov  o Information and professional assistance to support both immediate and long-term needs as you become, and remain, a non-smoker. Smokefree.gov allows you to choose the help that best fits your needs.

## 2017-04-05 RX ORDER — IPRATROPIUM BROMIDE AND ALBUTEROL SULFATE 2.5; .5 MG/3ML; MG/3ML
3 SOLUTION RESPIRATORY (INHALATION) EVERY 6 HOURS PRN
Qty: 120 VIAL | Refills: 0 | Status: SHIPPED | OUTPATIENT
Start: 2017-04-05 | End: 2019-05-22

## 2017-04-17 ENCOUNTER — DOCUMENTATION (OUTPATIENT)
Dept: HEMATOLOGY ONCOLOGY | Facility: MEDICAL CENTER | Age: 67
End: 2017-04-17

## 2017-04-17 NOTE — PROGRESS NOTES
Received referral to lung cancer screening program.  Chart review to assess for lung cancer screening program eligibility.   1. Age 55-77 yrs of age? Yes 66 y.o.  2. 30 pack year hx of smoking, or greater? Yes 1 [id number] pkyr hx  3. Current smoker or if quit, has pt quit within last 15 yrs?Yes, current smoker  4. Any signs or symptoms of lung cancer? No, hx of COPD    5. Previous history of lung cancer? No  6. Chest CT within past 12 mos.? No  Patient does meet eligibility criteria - [facility] scheduling notified to schedule the shared decision making visit.

## 2017-04-20 ENCOUNTER — TELEPHONE (OUTPATIENT)
Dept: HEMATOLOGY ONCOLOGY | Facility: MEDICAL CENTER | Age: 67
End: 2017-04-20

## 2017-04-20 NOTE — TELEPHONE ENCOUNTER
Left voicemail for patient requesting a return call to schedule shared decision making visit for lung screening program with  Ilsa MAYORGA Ref:Princess Matos V, Dx:Tobacco abuse

## 2017-05-08 ENCOUNTER — TELEPHONE (OUTPATIENT)
Dept: HEMATOLOGY ONCOLOGY | Facility: MEDICAL CENTER | Age: 67
End: 2017-05-08

## 2017-05-15 ENCOUNTER — APPOINTMENT (OUTPATIENT)
Dept: HEMATOLOGY ONCOLOGY | Facility: MEDICAL CENTER | Age: 67
End: 2017-05-15
Payer: MEDICARE

## 2017-05-15 ENCOUNTER — OFFICE VISIT (OUTPATIENT)
Dept: HEMATOLOGY ONCOLOGY | Facility: MEDICAL CENTER | Age: 67
End: 2017-05-15
Payer: MEDICARE

## 2017-05-15 VITALS
OXYGEN SATURATION: 100 % | RESPIRATION RATE: 14 BRPM | TEMPERATURE: 99 F | DIASTOLIC BLOOD PRESSURE: 64 MMHG | WEIGHT: 140.32 LBS | HEART RATE: 95 BPM | BODY MASS INDEX: 19.01 KG/M2 | SYSTOLIC BLOOD PRESSURE: 128 MMHG | HEIGHT: 72 IN

## 2017-05-15 DIAGNOSIS — F17.210 CIGARETTE SMOKER: ICD-10-CM

## 2017-05-15 PROCEDURE — G0296 VISIT TO DETERM LDCT ELIG: HCPCS | Performed by: NURSE PRACTITIONER

## 2017-05-15 ASSESSMENT — ENCOUNTER SYMPTOMS
SHORTNESS OF BREATH: 1
COUGH: 0
HEMOPTYSIS: 0
SPUTUM PRODUCTION: 0
WEIGHT LOSS: 1
WHEEZING: 0

## 2017-05-15 ASSESSMENT — PAIN SCALES - GENERAL: PAINLEVEL: NO PAIN

## 2017-05-15 NOTE — PROGRESS NOTES
Subjective:      Kobe Morris is a 66 y.o. male who presents with Lung Cancer Screening Program Prescreen for lung cancer screening shared decision making visit.           HPI    Patient seen today for initial lung cancer screening visit. Patient referred by his PCP Dr. Matos.     The patient meets eligibility criteria including age, smoking history (30+ pack years), if former smoker, quit in the last 15 years, and absence of signs or symptoms of lung cancer.    - Age - 66  - Smoking history - Patient has smoked for 48 years at an average of 1 ppd = 48 pack year smoking history.  - Current smoking status - Current smoker and he trying to quit smoking. He did try Chantix once but did not like the side effects. He is quitting on his own and is down to 1/2 ppd.   - No symptoms of lung cancer and no previous history of lung cancer     No Known Allergies  Current Outpatient Prescriptions on File Prior to Visit   Medication Sig Dispense Refill   • lovastatin (MEVACOR) 20 MG Tab Take 1 Tab by mouth every day. 30 Tab 6   • aspirin EC (ECOTRIN) 325 MG Tablet Delayed Response Take 1 Tab by mouth every day. 30 Tab 6   • azithromycin (ZITHROMAX) 250 MG Tab Take 2 tablets by mouth on day one. Take one tablet by mouth the remaining days until gone 6 Tab 0   • ipratropium-albuterol (DUONEB) 0.5-2.5 (3) MG/3ML nebulizer solution 3 mL by Nebulization route every 6 hours as needed for Shortness of Breath. 120 Vial 0   • amoxicillin-clavulanate (AUGMENTIN) 875-125 MG Tab Take 1 Tab by mouth 2 times a day. 20 Tab 0   • azithromycin (ZITHROMAX) 250 MG Tab Use as package directs (Patient not taking: Reported on 3/30/2017) 6 Tab 0   • guaifenesin-codeine (ROBITUSSIN AC) Solution oral solution Take 5 mL by mouth at bedtime as needed for Cough. (Patient not taking: Reported on 3/30/2017) 120 mL 0   • MethylPREDNISolone (MEDROL DOSEPAK) 4 MG Tablet Therapy Pack Use as package directs (Patient not taking: Reported on 3/30/2017) 21 Tab 0   •  "varenicline (CHANTIX STARTING MONTH MI) 0.5 MG X 11 & 1 MG X 42 tablet As dir 56 Tab 3   • clarithromycin (BIAXIN) 500 MG TABS Take 1 Tab by mouth 2 times a day. (Patient not taking: Reported on 12/19/2016) 20 Each 0   • methylPREDNISolone (MEDROL DOSPACK) 4 MG TABS Take  by mouth. Use as directed (Patient not taking: Reported on 12/19/2016) 1 Each 0     No current facility-administered medications on file prior to visit.       Review of Systems   Constitutional: Positive for weight loss (he does have a lot of stress in his life and recent loss of his wife). Negative for malaise/fatigue.   Respiratory: Positive for shortness of breath (with exertion). Negative for cough, hemoptysis, sputum production and wheezing.           Objective:     /64 mmHg  Pulse 95  Temp(Src) 37.2 °C (99 °F)  Resp 14  Ht 1.829 m (6' 0.01\")  Wt 63.65 kg (140 lb 5.2 oz)  BMI 19.03 kg/m2  SpO2 100%     Physical Exam   Constitutional: He is oriented to person, place, and time. He appears well-developed and well-nourished. No distress.   Cardiovascular: Normal rate, regular rhythm and normal heart sounds.    Pulmonary/Chest: Effort normal. No respiratory distress. He has no wheezes.   Diminished throughout   Musculoskeletal: Normal range of motion.   Neurological: He is alert and oriented to person, place, and time.   Skin: He is not diaphoretic.   Vitals reviewed.              Assessment/Plan:     1. Cigarette smoker  CT-LUNG CANCER-SCREENING       We conducted a shared decision-making process using a decision aid. We reviewed benefits and harms of screening, including false positives and potential need for additional diagnostic testing, the possibility of over diagnosis, and total radiation exposure.    We discussed the importance of adhering to annual LDCT screening. We also discussed the impact of comorbities on the patient's the ability or willingness to undergo diagnostic procedure(s) and treatment.    Counseling on the " importance of maintaining cigarette smoking abstinence if former smoker; or the importance of smoking cessation if current smoker and, if appropriate, furnishing of information about tobacco cessation interventions. I provided patient with smoking cessation materials and resources within [facility] and the community. Patient appreciative of the resources.       Based on our discussion, we have decided to begin annual lung cancer screening starting now.

## 2017-05-15 NOTE — MR AVS SNAPSHOT
"Kobe Morris   5/15/2017 10:00 AM   Office Visit   MRN: [id number]    Department:  [facility]   Dept Phone:  [phone]    Description:  Male : 1950   Provider:  RICHIE Loomis           Reason for Visit     Lung Cancer Screening Program Prescreen           Allergies as of 5/15/2017     No Known Allergies      You were diagnosed with     Cigarette smoker   [257254]         Vital Signs     Blood Pressure Pulse Temperature Respirations Height Weight    128/64 mmHg 95 37.2 °C (99 °F) 14 1.829 m (6' 0.01\") 63.65 kg (140 lb 5.2 oz)    Body Mass Index Oxygen Saturation Smoking Status             19.03 kg/m2 100% Current Every Day Smoker         Basic Information     Date Of Birth Sex Race Ethnicity Preferred Language    1950 Male White Non- English      Your appointments     May 30, 2017 10:15 AM   New Patient with Gabino Marcus M.D.   [facility] (--)    [address]  [facility] [id number] [phone]           Please bring Photo ID, Insurance Cards, All Medication Bottles and copies of any legal documents (such as Living Will, Power of ) If speaking a language besides English please bring an adult . Please arrive 30 minutes prior for check in and registration. You will be receiving a confirmation call a few days before your appointment from our automated call confirmation system.            2017  4:00 PM   Established Patient with Princess ELAM M.D.   [facility] (--)    [address] [facility] [address] [phone]           You will be receiving a confirmation call a few days before your appointment from our automated call confirmation system.              Problem List              ICD-10-CM Priority Class Noted - Resolved    Tobacco abuse Z72.0   Unknown - Present    Chronic obstructive pulmonary disease with acute exacerbation (CMS-HCC) J44.1   3/30/2017 - Present    Dental " caries K02.9   3/30/2017 - Present    Cervical spine disease M48.9   3/30/2017 - Present    Cerebral microvascular disease I67.9   3/30/2017 - Present      Health Maintenance        Date Due Completion Dates    IMM DTaP/Tdap/Td Vaccine (1 - Tdap) 8/28/1970 ---    COLONOSCOPY 8/28/2001 ---    IMM ZOSTER VACCINE 8/28/2011 ---    IMM PNEUMOCOCCAL 65+ (ADULT) LOW/MEDIUM RISK SERIES (1 of 2 - PCV13) 8/28/2016 ---            Current Immunizations     No immunizations on file.      Below and/or attached are the medications your provider expects you to take. Review all of your home medications and newly ordered medications with your provider and/or pharmacist. Follow medication instructions as directed by your provider and/or pharmacist. Please keep your medication list with you and share with your provider. Update the information when medications are discontinued, doses are changed, or new medications (including over-the-counter products) are added; and carry medication information at all times in the event of emergency situations     Allergies:  No Known Allergies          Medications  Valid as of: May 15, 2017 - 10:30 AM    Generic Name Brand Name Tablet Size Instructions for use    Amoxicillin-Pot Clavulanate (Tab) AUGMENTIN 875-125 MG Take 1 Tab by mouth 2 times a day.        Aspirin (Tablet Delayed Response) ECOTRIN 325 MG Take 1 Tab by mouth every day.        Azithromycin (Tab) ZITHROMAX 250 MG Take 2 tablets by mouth on day one. Take one tablet by mouth the remaining days until gone        Azithromycin (Tab) ZITHROMAX 250 MG Use as package directs        Clarithromycin (Tab) BIAXIN 500 MG Take 1 Tab by mouth 2 times a day.        Guaifenesin-Codeine (Solution) ROBITUSSIN -10 mg/5mL Take 5 mL by mouth at bedtime as needed for Cough.        Ipratropium-Albuterol (Solution) DUONEB 0.5-2.5 (3) MG/3ML 3 mL by Nebulization route every 6 hours as needed for Shortness of Breath.        Lovastatin (Tab) MEVACOR 20 MG  Take 1 Tab by mouth every day.        MethylPREDNISolone (Tab) MEDROL DOSPACK 4 MG Take  by mouth. Use as directed        MethylPREDNISolone (Tablet Therapy Pack) MEDROL DOSEPAK 4 MG Use as package directs        Varenicline Tartrate (Misc) CHANTIX MI 0.5 MG X 11 & 1 MG X 42 As dir        .                 Medicines prescribed today were sent to:     [facility] [address] [facility], [place] - [address]    [address] [facility] [place] [address]    Phone: [phone] Fax: [phone]    Open 24 Hours?: No    [facility] [facility] [place] - [address] [address]    Phone: [phone] Fax: [phone]    Open 24 Hours?: No      Medication refill instructions:       If your prescription bottle indicates you have medication refills left, it is not necessary to call your provider’s office. Please contact your pharmacy and they will refill your medication.    If your prescription bottle indicates you do not have any refills left, you may request refills at any time through one of the following ways: The online [employer] system (except Urgent Care), by calling your provider’s office, or by asking your pharmacy to contact your provider’s office with a refill request. Medication refills are processed only during regular business hours and may not be available until the next business day. Your provider may request additional information or to have a follow-up visit with you prior to refilling your medication.   *Please Note: Medication refills are assigned a new Rx number when refilled electronically. Your pharmacy may indicate that no refills were authorized even though a new prescription for the same medication is available at the pharmacy. Please request the medicine by name with the pharmacy before contacting your provider for a refill.        Your To Do List     Future Labs/Procedures Complete By Expires    CT-LUNG CANCER-SCREENING  As directed 5/15/2018          [employer] Access Code: Activation code not generated  Current [employer] Status: Active          Quit Tobacco Information     Do you want to quit using tobacco?    Quitting tobacco decreases risks of cancer, heart and lung disease, increases life expectancy, improves sense of taste and smell, and increases spending money, among other benefits.    If you are thinking about quitting, we can help.  • [facility] Quit Tobacco Program: [phone]  o Program occurs weekly for four weeks and includes pharmacist consultation on products to support quitting smoking or chewing tobacco. A provider referral is needed for pharmacist consultation.  • Tobacco Users Help Hotline: 7-037-QUIT-NOW (527-3240) or https://[place].quitlogix.org/  o Free, confidential telephone and online coaching for [place] residents. Sessions are designed on a schedule that is convenient for you. Eligible clients receive free nicotine replacement therapy.  • Nationally: www.smokefree.gov  o Information and professional assistance to support both immediate and long-term needs as you become, and remain, a non-smoker. Smokefree.gov allows you to choose the help that best fits your needs.

## 2017-05-30 ENCOUNTER — TELEPHONE (OUTPATIENT)
Dept: PHYSICAL MEDICINE AND REHAB | Facility: MEDICAL CENTER | Age: 67
End: 2017-05-30

## 2017-05-30 ENCOUNTER — HOSPITAL ENCOUNTER (OUTPATIENT)
Dept: RADIOLOGY | Facility: MEDICAL CENTER | Age: 67
End: 2017-05-30

## 2017-05-30 ENCOUNTER — OFFICE VISIT (OUTPATIENT)
Dept: PHYSICAL MEDICINE AND REHAB | Facility: MEDICAL CENTER | Age: 67
End: 2017-05-30
Payer: MEDICARE

## 2017-05-30 VITALS
WEIGHT: 139 LBS | TEMPERATURE: 98.1 F | HEART RATE: 89 BPM | HEIGHT: 73 IN | OXYGEN SATURATION: 96 % | BODY MASS INDEX: 18.42 KG/M2 | SYSTOLIC BLOOD PRESSURE: 112 MMHG | DIASTOLIC BLOOD PRESSURE: 64 MMHG

## 2017-05-30 DIAGNOSIS — M48.02 CERVICAL SPINAL STENOSIS: ICD-10-CM

## 2017-05-30 DIAGNOSIS — M25.519 SHOULDER PAIN, UNSPECIFIED CHRONICITY, UNSPECIFIED LATERALITY: ICD-10-CM

## 2017-05-30 DIAGNOSIS — M79.18 MYOFASCIAL PAIN: ICD-10-CM

## 2017-05-30 DIAGNOSIS — M54.2 NECK PAIN: ICD-10-CM

## 2017-05-30 DIAGNOSIS — M50.30 DDD (DEGENERATIVE DISC DISEASE), CERVICAL: ICD-10-CM

## 2017-05-30 DIAGNOSIS — M54.12 CERVICAL RADICULITIS: ICD-10-CM

## 2017-05-30 PROCEDURE — 4004F PT TOBACCO SCREEN RCVD TLK: CPT | Performed by: PHYSICAL MEDICINE & REHABILITATION

## 2017-05-30 PROCEDURE — G8419 CALC BMI OUT NRM PARAM NOF/U: HCPCS | Performed by: PHYSICAL MEDICINE & REHABILITATION

## 2017-05-30 PROCEDURE — 3017F COLORECTAL CA SCREEN DOC REV: CPT | Mod: 8P | Performed by: PHYSICAL MEDICINE & REHABILITATION

## 2017-05-30 PROCEDURE — 4040F PNEUMOC VAC/ADMIN/RCVD: CPT | Mod: 8P | Performed by: PHYSICAL MEDICINE & REHABILITATION

## 2017-05-30 PROCEDURE — 99204 OFFICE O/P NEW MOD 45 MIN: CPT | Performed by: PHYSICAL MEDICINE & REHABILITATION

## 2017-05-30 PROCEDURE — 1101F PT FALLS ASSESS-DOCD LE1/YR: CPT | Performed by: PHYSICAL MEDICINE & REHABILITATION

## 2017-05-30 PROCEDURE — G8432 DEP SCR NOT DOC, RNG: HCPCS | Performed by: PHYSICAL MEDICINE & REHABILITATION

## 2017-05-30 ASSESSMENT — ENCOUNTER SYMPTOMS
HEMOPTYSIS: 0
HEADACHES: 0
SENSORY CHANGE: 1
SPUTUM PRODUCTION: 0
DOUBLE VISION: 0
TINGLING: 1
MYALGIAS: 1
ORTHOPNEA: 0
PHOTOPHOBIA: 0
NAUSEA: 0
CHILLS: 0
PALPITATIONS: 0
NECK PAIN: 1
FEVER: 0
VOMITING: 0

## 2017-05-30 NOTE — MR AVS SNAPSHOT
"Kobe Morris   2017 10:15 AM   Office Visit   MRN: [id number]    Department:  [facility]   Dept Phone:  [phone]    Description:  Male : 1950   Provider:  Gabino Marcus M.D.           Reason for Visit     New Patient           Allergies as of 2017     No Known Allergies      You were diagnosed with     DDD (degenerative disc disease), cervical   [775320]       Cervical spinal stenosis   [181058]         Vital Signs     Blood Pressure Pulse Temperature Height Weight Body Mass Index    112/64 mmHg 89 36.7 °C (98.1 °F) 1.854 m (6' 0.99\") 63.05 kg (139 lb) 18.34 kg/m2    Oxygen Saturation Smoking Status                96% Current Every Day Smoker          Basic Information     Date Of Birth Sex Race Ethnicity Preferred Language    1950 Male White Non- English      Your appointments     2017 10:30 AM   CT-LUNG CANCER SCREENING [facility]   [facility]    [facility] [place]  [address] [address]  [address]  [facility] [address]   [phone]            2017  4:00 PM   Established Patient with Princess ELAM M.D.   [facility] (--)    [address] [facility] [id number] [phone]           You will be receiving a confirmation call a few days before your appointment from our automated call confirmation system.            May 30, 2018  9:15 AM   Follow Up Visit with Gabino Marcus M.D.   [facility] (--)    [address]  [place] [id number] [phone]           You will be receiving a confirmation call a few days before your appointment from our automated call confirmation system.              Problem List              ICD-10-CM Priority Class Noted - Resolved    Tobacco abuse Z72.0   Unknown - Present    Chronic obstructive pulmonary disease with acute exacerbation (CMS-HCC) J44.1   3/30/2017 - Present    Dental caries K02.9   3/30/2017 - " Present    Cervical spine disease M48.9   3/30/2017 - Present    Cerebral microvascular disease I67.9   3/30/2017 - Present      Health Maintenance        Date Due Completion Dates    IMM DTaP/Tdap/Td Vaccine (1 - Tdap) 8/28/1970 ---    COLONOSCOPY 8/28/2001 ---    IMM ZOSTER VACCINE 8/28/2011 ---    IMM PNEUMOCOCCAL 65+ (ADULT) LOW/MEDIUM RISK SERIES (1 of 2 - PCV13) 8/28/2016 ---            Current Immunizations     No immunizations on file.      Below and/or attached are the medications your provider expects you to take. Review all of your home medications and newly ordered medications with your provider and/or pharmacist. Follow medication instructions as directed by your provider and/or pharmacist. Please keep your medication list with you and share with your provider. Update the information when medications are discontinued, doses are changed, or new medications (including over-the-counter products) are added; and carry medication information at all times in the event of emergency situations     Allergies:  No Known Allergies          Medications  Valid as of: May 30, 2017 - 10:17 AM    Generic Name Brand Name Tablet Size Instructions for use    Amoxicillin-Pot Clavulanate (Tab) AUGMENTIN 875-125 MG Take 1 Tab by mouth 2 times a day.        Aspirin (Tablet Delayed Response) ECOTRIN 325 MG Take 1 Tab by mouth every day.        Azithromycin (Tab) ZITHROMAX 250 MG Take 2 tablets by mouth on day one. Take one tablet by mouth the remaining days until gone        Azithromycin (Tab) ZITHROMAX 250 MG Use as package directs        Clarithromycin (Tab) BIAXIN 500 MG Take 1 Tab by mouth 2 times a day.        Guaifenesin-Codeine (Solution) ROBITUSSIN -10 mg/5mL Take 5 mL by mouth at bedtime as needed for Cough.        Ipratropium-Albuterol (Solution) DUONEB 0.5-2.5 (3) MG/3ML 3 mL by Nebulization route every 6 hours as needed for Shortness of Breath.        Lovastatin (Tab) MEVACOR 20 MG Take 1 Tab by mouth every  day.        MethylPREDNISolone (Tab) MEDROL DOSPACK 4 MG Take  by mouth. Use as directed        MethylPREDNISolone (Tablet Therapy Pack) MEDROL DOSEPAK 4 MG Use as package directs        Varenicline Tartrate (Misc) CHANTIX MI 0.5 MG X 11 & 1 MG X 42 As dir        .                 Medicines prescribed today were sent to:     [facility] [place] [place] [facility] [address] [facility]    [address] [facility] [place] [address]    Phone: [phone] Fax: [phone]    Open 24 Hours?: No      Medication refill instructions:       If your prescription bottle indicates you have medication refills left, it is not necessary to call your provider’s office. Please contact your pharmacy and they will refill your medication.    If your prescription bottle indicates you do not have any refills left, you may request refills at any time through one of the following ways: The online [employer] system (except Urgent Care), by calling your provider’s office, or by asking your pharmacy to contact your provider’s office with a refill request. Medication refills are processed only during regular business hours and may not be available until the next business day. Your provider may request additional information or to have a follow-up visit with you prior to refilling your medication.   *Please Note: Medication refills are assigned a new Rx number when refilled electronically. Your pharmacy may indicate that no refills were authorized even though a new prescription for the same medication is available at the pharmacy. Please request the medicine by name with the pharmacy before contacting your provider for a refill.        Your To Do List     Future Labs/Procedures Complete By Expires    DX-CERVICAL SPINE-4+ VIEWS  As directed 5/30/2018         [employer] Access Code: Activation code not generated  Current [employer] Status: Active          Quit Tobacco Information     Do you want to quit using tobacco?    Quitting tobacco decreases  risks of cancer, heart and lung disease, increases life expectancy, improves sense of taste and smell, and increases spending money, among other benefits.    If you are thinking about quitting, we can help.  • [facility] Quit Tobacco Program: 407.720.9736  o Program occurs weekly for four weeks and includes pharmacist consultation on products to support quitting smoking or chewing tobacco. A provider referral is needed for pharmacist consultation.  • Tobacco Users Help Hotline: 2-499-QUIT-NOW (674-8743) or https://[place].quitlogix.org/  o Free, confidential telephone and online coaching for [place] residents. Sessions are designed on a schedule that is convenient for you. Eligible clients receive free nicotine replacement therapy.  • Nationally: www.smokefree.gov  o Information and professional assistance to support both immediate and long-term needs as you become, and remain, a non-smoker. Smokefree.gov allows you to choose the help that best fits your needs.

## 2017-05-30 NOTE — PROGRESS NOTES
Subjective:      Kobe Morris is a 66 y.o. male who presents with New Patient      Chief complaint: History of neck and arm pain        HPI   The patient notes history of intermittent neck pain, also intermittent left upper limb radiation, currently controlled. The patient denies prior trauma at onset.    At today's visit, the patient notes neck pain is controlled, without radicular component.    No overt carpal tunnel symptoms noted.    The patient notes intermittent joint/musculoskeletal pain, primarily activity associated, controlled.    The patient denies overt gait imbalance, denies in her ear or eye symptomatology. No overt weakness noted.      MEDICAL RECORDS REVIEW/DATA REVIEW: Reviewed in epic.    Records Reviewed: Reviewed referring provider notes, referral.     I reviewed medications.     I reviewed  profile 5/30/2017.    I reviewed diagnostic studies:     I reviewed radiographs. Reviewed MRI cervical spine 1/2017, images and report, showed multilevel degenerative changes, disc protrusions, central stenosis greatest at C4-5, foraminal stenosis, cervical spondylosis, no intrinsic cord changes noted. Reviewed MRI brain 1/2017. Reviewed chest x-ray 1/2017.    I reviewed lab studies. None available for review      I reviewed medical issues.     I reviewed family history: No neuromuscular disorders noted.    I reviewed social issues. Maker, former master diver      PAST MEDICAL HISTORY:   Past Medical History   Diagnosis Date   • Tobacco abuse    • Chronic obstructive pulmonary disease with acute exacerbation (CMS-HCC) 3/30/2017   • Dental caries 3/30/2017   • Cervical spine disease 3/30/2017       PAST SURGICAL HISTORY:  History reviewed. No pertinent past surgical history.    ALLERGIES:  Review of patient's allergies indicates no known allergies.    MEDICATIONS:    Outpatient Encounter Prescriptions as of 5/30/2017   Medication Sig Dispense Refill   • ipratropium-albuterol (DUONEB) 0.5-2.5 (3) MG/3ML  nebulizer solution 3 mL by Nebulization route every 6 hours as needed for Shortness of Breath. 120 Vial 0   • lovastatin (MEVACOR) 20 MG Tab Take 1 Tab by mouth every day. 30 Tab 6   • [DISCONTINUED] amoxicillin-clavulanate (AUGMENTIN) 875-125 MG Tab Take 1 Tab by mouth 2 times a day. 20 Tab 0   • guaifenesin-codeine (ROBITUSSIN AC) Solution oral solution Take 5 mL by mouth at bedtime as needed for Cough. (Patient not taking: Reported on 3/30/2017) 120 mL 0   • [DISCONTINUED] azithromycin (ZITHROMAX) 250 MG Tab Use as package directs (Patient not taking: Reported on 3/30/2017) 6 Tab 0   • [DISCONTINUED] MethylPREDNISolone (MEDROL DOSEPAK) 4 MG Tablet Therapy Pack Use as package directs (Patient not taking: Reported on 3/30/2017) 21 Tab 0   • aspirin EC (ECOTRIN) 325 MG Tablet Delayed Response Take 1 Tab by mouth every day. 30 Tab 6   • varenicline (CHANTIX STARTING MONTH PAK) 0.5 MG X 11 & 1 MG X 42 tablet As dir 56 Tab 3   • [DISCONTINUED] azithromycin (ZITHROMAX) 250 MG Tab Take 2 tablets by mouth on day one. Take one tablet by mouth the remaining days until gone 6 Tab 0   • [DISCONTINUED] clarithromycin (BIAXIN) 500 MG TABS Take 1 Tab by mouth 2 times a day. (Patient not taking: Reported on 12/19/2016) 20 Each 0   • [DISCONTINUED] methylPREDNISolone (MEDROL DOSPACK) 4 MG TABS Take  by mouth. Use as directed (Patient not taking: Reported on 12/19/2016) 1 Each 0     No facility-administered encounter medications on file as of 5/30/2017.       SOCIAL HISTORY:    Social History     Social History   • Marital Status: Single     Spouse Name: N/A   • Number of Children: N/A   • Years of Education: N/A     Social History Main Topics   • Smoking status: Current Every Day Smoker -- 1.00 packs/day for 48 years     Types: Cigarettes   • Smokeless tobacco: Never Used   • Alcohol Use: 1.0 oz/week     2 Cans of beer per week   • Drug Use: No   • Sexual Activity: Not Asked     Other Topics Concern   •  Service No   •  "Blood Transfusions No   • Caffeine Concern No   • Occupational Exposure No   • Hobby Hazards No   • Sleep Concern No   • Stress Concern No   • Weight Concern Yes     lost weight    • Special Diet No   • Back Care No   • Exercise Yes   • Bike Helmet No     does not ride bike   • Seat Belt Yes   • Self-Exams No     Social History Narrative       Review of Systems   Constitutional: Negative for fever and chills.   HENT: Negative for hearing loss.    Eyes: Negative for double vision and photophobia.   Respiratory: Negative for hemoptysis and sputum production.    Cardiovascular: Negative for palpitations and orthopnea.   Gastrointestinal: Negative for nausea and vomiting.   Genitourinary: Negative for urgency and frequency.   Musculoskeletal: Positive for myalgias and neck pain.   Skin: Negative.    Neurological: Positive for tingling and sensory change. Negative for headaches.   Endo/Heme/Allergies: Negative.    All other systems reviewed and are negative.        Objective:     /64 mmHg  Pulse 89  Temp(Src) 36.7 °C (98.1 °F)  Ht 1.854 m (6' 0.99\")  Wt 63.05 kg (139 lb)  BMI 18.34 kg/m2  SpO2 96%     Physical Exam  Constitutional: oriented to person, place, and time, appears well-developed and well-nourished.   HEENT: Normocephalic atraumatic, neck supple, no JVD noted, no masses noted, no meningeal signs noted  Lymphadenopathy: no cervical, supraclavicular, or occipital lymphadenopathy noted  Cardiovascular: Intact distal pulses, including at wrists, no upper limb swelling noted  Pulmonary: No tachypnea noted, no accessory muscle use noted, no dyspnea noted  Abdominal: Soft, nontender, exhibits no distension, no peritoneal signs, no HSM  Musculoskeletal:   Right shoulder: exhibits only mild tenderness. Minimal pain with range of motion testing  Left shoulder: exhibits only mild tenderness. Minimal pain with range of motion testing  Cervical back: exhibits mild decreased range of motion, mild tenderness and " mild pain. Spurling's testing produces mild axial pain, trigger points noted  Wrist/hand: Only mild pain with range of motion testing, negative tinel's at wrist, negative tinel's at elbows  Neurological: oriented to person, place, and time. Cranial nerves grossly intact, normal strength. Sensation intact distally. Reflexes 1+ in upper limbs, Gait normal. Able to heel/toe walk, No upper motor neuron signs evident, normal Romberg testing, mildly unsteady with tandem gait testing but able to complete  Skin: Skin is intact. no rashes or lesions noted  Psychiatric: normal mood and affect. speech is normal and behavior is normal. Judgment and thought content normal. Cognition and memory are normal.         Assessment/Plan:       ASSESSMENT:    1. History of neck pain, myofascial pain, cervical spinal stenosis, disc protrusions, degenerative disc disease, cervical spondylosis, symptomatically controlled    - DX-CERVICAL SPINE-4+ VIEWS; Future  - Reviewed spinal precautions    2. Shoulder area pain, sprain strain, symptomatically controlled      DISCUSSION/PLAN:    - I discussed management options. I reviewed symptomatic care    - I reviewed home exercise program, with medical precautions. I reviewed spinal precautions.    - The patient can consider complementary trials with acupuncture or TENS unit    - I reviewed medication monitoring.  I reviewed further symptomatic medications. I did not prescribe any medications today    - I reviewed additional diagnostic options, including serial MRI imaging, recommended in one year if symptoms controlled, electrodiagnostic testing, vascular studies, and lab screen    - I reviewed additional therapeutic options, including injection/interventional therapy and additional consultative input, including spine surgery consultation, reviewed with patient    - I reviewed psychosocial interventions    - I encourage the patient to stop or decrease cigarette use    - Followup with primary care  provider and consultants regarding co-morbid medical isues, as well as for health maintenance/co-management    - Return in one year  or an as-needed basis      Please note that this dictation was created using voice recognition software. I have made every reasonable attempt to correct obvious errors but there may be errors of grammar and content that I may have overlooked prior to finalization of this note.

## 2017-06-01 ENCOUNTER — HOSPITAL ENCOUNTER (OUTPATIENT)
Dept: RADIOLOGY | Facility: MEDICAL CENTER | Age: 67
End: 2017-06-01
Attending: NURSE PRACTITIONER
Payer: MEDICARE

## 2017-06-01 ENCOUNTER — HOSPITAL ENCOUNTER (OUTPATIENT)
Dept: RADIOLOGY | Facility: MEDICAL CENTER | Age: 67
End: 2017-06-01
Attending: PHYSICAL MEDICINE & REHABILITATION
Payer: MEDICARE

## 2017-06-01 DIAGNOSIS — M50.30 DDD (DEGENERATIVE DISC DISEASE), CERVICAL: ICD-10-CM

## 2017-06-01 DIAGNOSIS — F17.210 CIGARETTE SMOKER: ICD-10-CM

## 2017-06-01 DIAGNOSIS — M48.02 CERVICAL SPINAL STENOSIS: ICD-10-CM

## 2017-06-01 PROCEDURE — 72050 X-RAY EXAM NECK SPINE 4/5VWS: CPT

## 2017-06-01 PROCEDURE — G0297 LDCT FOR LUNG CA SCREEN: HCPCS

## 2017-06-02 ENCOUNTER — TELEPHONE (OUTPATIENT)
Dept: HEMATOLOGY ONCOLOGY | Facility: MEDICAL CENTER | Age: 67
End: 2017-06-02

## 2017-06-02 DIAGNOSIS — R91.1 LUNG NODULE: ICD-10-CM

## 2017-06-02 NOTE — TELEPHONE ENCOUNTER
Phoned patient with results of LDCT exam performed 6/1/2017  following review with TIERRA Rand.  Notified him that the results showed multiple bilateral noncalcified nodules.   To monitor for change we recommend a follow-up low-dose chest CT in 6 months and a referral to our Pulmonary Medicine group for evaluation.   Informed of findings of severe emphysema with follow-up back to PCP.  Patient verbalized understanding and agrees to all recommendations.  I let him know that the pulmonary office will phone him for an appointment and the results of his exam will be released by TIERRA Rand to [facility].  Health maintenance updated and result letter sent to patient.  Referring physician and new PCP Dr. Matos notified via [employer].

## 2017-06-27 ENCOUNTER — OFFICE VISIT (OUTPATIENT)
Dept: PULMONOLOGY | Facility: HOSPICE | Age: 67
End: 2017-06-27
Payer: MEDICARE

## 2017-06-27 VITALS
HEIGHT: 73 IN | BODY MASS INDEX: 18.5 KG/M2 | WEIGHT: 139.6 LBS | SYSTOLIC BLOOD PRESSURE: 122 MMHG | OXYGEN SATURATION: 96 % | RESPIRATION RATE: 16 BRPM | HEART RATE: 86 BPM | DIASTOLIC BLOOD PRESSURE: 78 MMHG | TEMPERATURE: 97.9 F

## 2017-06-27 DIAGNOSIS — R91.8 LUNG NODULES: ICD-10-CM

## 2017-06-27 DIAGNOSIS — Z72.0 TOBACCO ABUSE: ICD-10-CM

## 2017-06-27 PROCEDURE — 99204 OFFICE O/P NEW MOD 45 MIN: CPT | Performed by: INTERNAL MEDICINE

## 2017-06-27 RX ORDER — AMOXICILLIN AND CLAVULANATE POTASSIUM 875; 125 MG/1; MG/1
TABLET, FILM COATED ORAL
COMMUNITY
Start: 2017-03-30 | End: 2017-07-26

## 2017-06-27 NOTE — PROGRESS NOTES
"Chief Complaint   Patient presents with   • Establish Care     Referred by TRANG Campos        HPI: This patient is a 66 y.o. Male who is referred for lung cancer screening CAT scan results. He has been a lifelong smoker with 40 pack years to date, and continues to smoke a pack per day. He is interested in quitting and recently tried Chantix however felt \"off\" after a couple of days and discontinued treatment. He denies shortness of breath, cough, wheezing, chest tightness or hemoptysis. He denies a history of bronchitis or pneumonia. His father  of lung cancer, who was a smoker.  Low-dose lung cancer screening chest CAT scan on 2017 showed a 7 mm noncalcified left lower lobe nodule, 5 mm right lower lobe nodule, multiple bilateral less than 5 mm pulmonary nodules. Of incidental finding this emphysema and parenchymal scarring in the left lower lobe.   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      Past Medical History   Diagnosis Date   • Tobacco abuse    • Chronic obstructive pulmonary disease with acute exacerbation (CMS-HCC) 3/30/2017   • Dental caries 3/30/2017   • Cervical spine disease 3/30/2017   • Pneumonia    • Tonsillitis        Social History     Social History   • Marital Status: Single     Spouse Name: N/A   • Number of Children: N/A   • Years of Education: N/A     Occupational History   • Not on file.     Social History Main Topics   • Smoking status: Current Every Day Smoker -- 1.00 packs/day for 48 years     Types: Cigarettes   • Smokeless tobacco: Never Used      Comment: Started smoking in    • Alcohol Use: 1.0 oz/week     2 Cans of beer per week      Comment: Average per weekday 20 Average per weekend 6   • Drug Use: No   • Sexual Activity: Not on file     Other Topics Concern   •  Service No   • Blood Transfusions No   • Caffeine Concern No   • Occupational Exposure No   • Hobby Hazards No   • Sleep Concern No   • Stress Concern No   • " Weight Concern Yes     lost weight    • Special Diet No   • Back Care No   • Exercise Yes   • Bike Helmet No     does not ride bike   • Seat Belt Yes   • Self-Exams No     Social History Narrative       Family History   Problem Relation Age of Onset   • Cancer Father      lung   • No Known Problems Mother    • No Known Problems Brother        Current Outpatient Prescriptions on File Prior to Visit   Medication Sig Dispense Refill   • ipratropium-albuterol (DUONEB) 0.5-2.5 (3) MG/3ML nebulizer solution 3 mL by Nebulization route every 6 hours as needed for Shortness of Breath. 120 Vial 0   • lovastatin (MEVACOR) 20 MG Tab Take 1 Tab by mouth every day. 30 Tab 6   • aspirin EC (ECOTRIN) 325 MG Tablet Delayed Response Take 1 Tab by mouth every day. 30 Tab 6   • guaifenesin-codeine (ROBITUSSIN AC) Solution oral solution Take 5 mL by mouth at bedtime as needed for Cough. (Patient not taking: Reported on 3/30/2017) 120 mL 0   • varenicline (CHANTIX STARTING MONTH PAK) 0.5 MG X 11 & 1 MG X 42 tablet As dir 56 Tab 3     No current facility-administered medications on file prior to visit.       Allergies: Review of patient's allergies indicates no known allergies.    ROS:   Constitutional: Denies fevers, chills, night sweats, fatigue, gradual weight loss  Eyes: Denies vision loss, pain, drainage, double vision  Ears, Nose, Throat: Denies earache, difficulty hearing, tinnitus, nasal congestion, hoarseness  Cardiovascular: Denies chest pain, tightness, palpitations, orthopnea or edema  Respiratory: Denies shortness of breath, cough, wheezing, hemoptysis  Sleep: Denies daytime sleepiness, snoring, apneas, insomnia, morning headaches  GI: Denies heartburn, dysphagia, nausea, abdominal pain, diarrhea or constipation  : Denies frequent urination, hematuria, discharge or painful urination  Musculoskeletal: Denies back pain, painful joints, sore muscles  Neurological: Denies weakness or headaches  Skin: No rashes    Blood pressure  "122/78, pulse 86, temperature 36.6 °C (97.9 °F), resp. rate 16, height 1.854 m (6' 0.99\"), weight 63.322 kg (139 lb 9.6 oz), SpO2 96 %.    Physical Exam:  Appearance: Well-nourished, well-developed, in no acute distress  HEENT: Normocephalic, atraumatic, white sclera, PERRLA, oropharynx clear  Neck: No adenopathy or masses  Respiratory: no intercostal retractions or accessory muscle use  Lungs auscultation: Clear to auscultation bilaterally  Cardiovascular: Regular rate rhythm. No murmurs, rubs or gallops.  No LE edema  Abdomen: soft, nondistended  Gait: Normal  Digits: No clubbing, cyanosis  Motor: No focal deficits  Orientation: Oriented to time, person and place    Diagnosis:  1. Lung nodules     2. Tobacco abuse         Plan:  The patient has multiple subcentimeter pulmonary nodules, with malignancy risk factors including lifelong smoking, family history of lung cancer. The largest nodule measures 7 mm in the left lower lobe. We discussed the importance of smoking cessation, and smoking cessation counseling was provided. He does appear motivated to quit and would like to try over-the-counter nicotine replacement.  We discussed the importance of ongoing surveillance of the pulmonary nodules to confirm stability. Any significant interval growth in the nodules may warrant further diagnostic workup with either PET imaging, biopsy or resection.  He expressed understanding and all questions were answered.  He has findings of emphysema on chest CAT scan and we will arrange for pulmonary function testing for staging of COPD.  Return in about 6 months (around 12/27/2017) for after CT scan, at lung nodule clinic, follow up visit with Ilsa Simmons MD.        "

## 2017-06-27 NOTE — MR AVS SNAPSHOT
"Kobe Morris   2017 10:20 AM   Office Visit   MRN: [id number]    Department:  [facility]   Dept Phone:  [phone]    Description:  Male : 1950   Provider:  Ilsa Simmons M.D.           Reason for Visit     Establish Care Referred by TRANG Campos       Allergies as of 2017     No Known Allergies      You were diagnosed with     Lung nodules   [201001]       Tobacco abuse   [237272]         Vital Signs     Blood Pressure Pulse Temperature Respirations Height Weight    122/78 mmHg 86 36.6 °C (97.9 °F) 16 1.854 m (6' 0.99\") 63.322 kg (139 lb 9.6 oz)    Body Mass Index Oxygen Saturation Smoking Status             18.42 kg/m2 96% Current Every Day Smoker         Basic Information     Date Of Birth Sex Race Ethnicity Preferred Language    1950 Male White Non- English      Your appointments     2017  4:00 PM   Established Patient with Princess ELAM M.D.   [facility] (--)    [address] [address] [phone]           You will be receiving a confirmation call a few days before your appointment from our automated call confirmation system.            2018  9:40 AM   Established Patient Pul with Ilsa Simmons M.D.   [facility] (--)    [address]  [facility] [place] [id number] [phone]            May 30, 2018  9:15 AM   Follow Up Visit with Gabino Marcus M.D.   [facility] (--)    [address]  [place] [place] [address] [phone]           You will be receiving a confirmation call a few days before your appointment from our automated call confirmation system.              Problem List              ICD-10-CM Priority Class Noted - Resolved    Tobacco abuse Z72.0   Unknown - Present    Chronic obstructive pulmonary disease with acute exacerbation (CMS-HCC) J44.1   3/30/2017 - Present    Dental caries K02.9   3/30/2017 - Present    Cervical " spine disease M48.9   3/30/2017 - Present    Cerebral microvascular disease I67.9   3/30/2017 - Present      Health Maintenance        Date Due Completion Dates    IMM DTaP/Tdap/Td Vaccine (1 - Tdap) 8/28/1970 ---    COLONOSCOPY 8/28/2001 ---    IMM ZOSTER VACCINE 8/28/2011 ---    IMM PNEUMOCOCCAL 65+ (ADULT) LOW/MEDIUM RISK SERIES (1 of 2 - PCV13) 8/28/2016 ---            Current Immunizations     Influenza TIV (IM)  Deferred (Patient Refused)    Pneumococcal polysaccharide vaccine (PPSV-23)  Deferred (Patient Refused)      Below and/or attached are the medications your provider expects you to take. Review all of your home medications and newly ordered medications with your provider and/or pharmacist. Follow medication instructions as directed by your provider and/or pharmacist. Please keep your medication list with you and share with your provider. Update the information when medications are discontinued, doses are changed, or new medications (including over-the-counter products) are added; and carry medication information at all times in the event of emergency situations     Allergies:  No Known Allergies          Medications  Valid as of: June 27, 2017 - 10:38 AM    Generic Name Brand Name Tablet Size Instructions for use    Amoxicillin-Pot Clavulanate (Tab) AUGMENTIN 875-125 MG         Aspirin (Tablet Delayed Response) ECOTRIN 325 MG Take 1 Tab by mouth every day.        Guaifenesin-Codeine (Solution) ROBITUSSIN -10 mg/5mL Take 5 mL by mouth at bedtime as needed for Cough.        Ipratropium-Albuterol (Solution) DUONEB 0.5-2.5 (3) MG/3ML 3 mL by Nebulization route every 6 hours as needed for Shortness of Breath.        Lovastatin (Tab) MEVACOR 20 MG Take 1 Tab by mouth every day.        Varenicline Tartrate (Misc) CHANTIX MI 0.5 MG X 11 & 1 MG X 42 As dir        .                 Medicines prescribed today were sent to:     [facility]  [place], [place] - [address]  [facility] [place] [address]    Phone: [phone] Fax: [phone]    Open 24 Hours?: No      Medication refill instructions:       If your prescription bottle indicates you have medication refills left, it is not necessary to call your provider’s office. Please contact your pharmacy and they will refill your medication.    If your prescription bottle indicates you do not have any refills left, you may request refills at any time through one of the following ways: The online [employer] system (except Urgent Care), by calling your provider’s office, or by asking your pharmacy to contact your provider’s office with a refill request. Medication refills are processed only during regular business hours and may not be available until the next business day. Your provider may request additional information or to have a follow-up visit with you prior to refilling your medication.   *Please Note: Medication refills are assigned a new Rx number when refilled electronically. Your pharmacy may indicate that no refills were authorized even though a new prescription for the same medication is available at the pharmacy. Please request the medicine by name with the pharmacy before contacting your provider for a refill.           M[employer]app Access Code: Activation code not generated  Current [employer] Status: Active          Quit Tobacco Information     Do you want to quit using tobacco?    Quitting tobacco decreases risks of cancer, heart and lung disease, increases life expectancy, improves sense of taste and smell, and increases spending money, among other benefits.    If you are thinking about quitting, we can help.  • [facility] Quit Tobacco Program: 784.857.8934  o Program occurs weekly for four weeks and includes pharmacist consultation on products to support quitting smoking or chewing tobacco. A provider referral is needed for pharmacist consultation.  • Tobacco Users Help Hotline: 6-169-QUIT-NOW (407-3991) or  https://[place].quitlogix.org/  o Free, confidential telephone and online coaching for [place] residents. Sessions are designed on a schedule that is convenient for you. Eligible clients receive free nicotine replacement therapy.  • Nationally: www.smokefree.gov  o Information and professional assistance to support both immediate and long-term needs as you become, and remain, a non-smoker. Smokefree.gov allows you to choose the help that best fits your needs.

## 2017-07-07 ENCOUNTER — OFFICE VISIT (OUTPATIENT)
Dept: MEDICAL GROUP | Facility: PHYSICIAN GROUP | Age: 67
End: 2017-07-07
Payer: MEDICARE

## 2017-07-07 VITALS
WEIGHT: 139 LBS | BODY MASS INDEX: 18.83 KG/M2 | SYSTOLIC BLOOD PRESSURE: 138 MMHG | HEIGHT: 72 IN | HEART RATE: 83 BPM | TEMPERATURE: 99.1 F | DIASTOLIC BLOOD PRESSURE: 80 MMHG | OXYGEN SATURATION: 95 %

## 2017-07-07 DIAGNOSIS — Z72.0 TOBACCO ABUSE: ICD-10-CM

## 2017-07-07 DIAGNOSIS — E78.5 HYPERLIPIDEMIA, UNSPECIFIED HYPERLIPIDEMIA TYPE: ICD-10-CM

## 2017-07-07 DIAGNOSIS — Z00.00 HEALTH CARE MAINTENANCE: ICD-10-CM

## 2017-07-07 DIAGNOSIS — J44.1 CHRONIC OBSTRUCTIVE PULMONARY DISEASE WITH ACUTE EXACERBATION (HCC): ICD-10-CM

## 2017-07-07 PROCEDURE — 99214 OFFICE O/P EST MOD 30 MIN: CPT | Performed by: INTERNAL MEDICINE

## 2017-07-07 ASSESSMENT — PAIN SCALES - GENERAL: PAINLEVEL: NO PAIN

## 2017-07-07 NOTE — ASSESSMENT & PLAN NOTE
Patient had CT, positive for bilateral noncalcified nodules, due for repeat low dose in Dec.  Seen by pulmonary.

## 2017-07-07 NOTE — ASSESSMENT & PLAN NOTE
Patient reports he is down to 2/3 ppd,  He did not feel good on the chantix.  He is working on placing the cigarettes across the room.

## 2017-07-07 NOTE — PROGRESS NOTES
Chief Complaint   Patient presents with   • Follow-Up     pulmonary, chest x-rays   • Other     colonoscopy if  thinks he needs one        HISTORY OF PRESENT ILLNESS: Patient is a 66 y.o. male established patient who presents today to discuss the medical issues below.    Chronic obstructive pulmonary disease with acute exacerbation (CMS-HCC)  Patient had CT, positive for bilateral noncalcified nodules, due for repeat low dose in Dec.  Seen by pulmonary.      Tobacco abuse  Patient reports he is down to 2/3 ppd,  He did not feel good on the chantix.  He is working on placing the cigarettes across the room. He does have insight that he tends to focus cigarette when stressed and for timeout.      Patient Active Problem List    Diagnosis Date Noted   • Chronic obstructive pulmonary disease with acute exacerbation (CMS-HCC) 03/30/2017   • Dental caries 03/30/2017   • Cervical spine disease 03/30/2017   • Cerebral microvascular disease 03/30/2017   • Tobacco abuse        Allergies:Review of patient's allergies indicates no known allergies.    Current Outpatient Prescriptions   Medication Sig Dispense Refill   • ipratropium-albuterol (DUONEB) 0.5-2.5 (3) MG/3ML nebulizer solution 3 mL by Nebulization route every 6 hours as needed for Shortness of Breath. 120 Vial 0   • lovastatin (MEVACOR) 20 MG Tab Take 1 Tab by mouth every day. 30 Tab 6   • aspirin EC (ECOTRIN) 325 MG Tablet Delayed Response Take 1 Tab by mouth every day. 30 Tab 6   • amoxicillin-clavulanate (AUGMENTIN) 875-125 MG Tab      • guaifenesin-codeine (ROBITUSSIN AC) Solution oral solution Take 5 mL by mouth at bedtime as needed for Cough. (Patient not taking: Reported on 3/30/2017) 120 mL 0   • varenicline (CHANTIX STARTING MONTH PAK) 0.5 MG X 11 & 1 MG X 42 tablet As dir 56 Tab 3     No current facility-administered medications for this visit.         Past Medical History   Diagnosis Date   • Tobacco abuse    • Chronic obstructive pulmonary disease  "with acute exacerbation (CMS-HCC) 3/30/2017   • Dental caries 3/30/2017   • Cervical spine disease 3/30/2017   • Pneumonia    • Tonsillitis        Social History   Substance Use Topics   • Smoking status: Current Every Day Smoker -- 0.50 packs/day for 48 years     Types: Cigarettes   • Smokeless tobacco: Never Used      Comment: Started smoking in    • Alcohol Use: 1.0 oz/week     2 Cans of beer per week      Comment: Average per weekday 20 Average per weekend 6       Family Status   Relation Status Death Age   • Father     • Mother Alive    • Brother Alive      Family History   Problem Relation Age of Onset   • Cancer Father      lung   • No Known Problems Mother    • No Known Problems Brother        ROS:    Respiratory: Negative for cough, sputum production, shortness of breath or wheezing.    Cardiovascular: Negative for chest pain, palpitations, orthopnea, dyspnea with exertion or edema.   Gastrointestinal: Negative for GI upset, nausea, vomiting, abdominal pain, constipation or diarrhea.   Genitourinary: Negative for dysuria, urgency, hesitancy or frequency.       Exam:    Blood pressure 138/80, pulse 83, temperature 37.3 °C (99.1 °F), height 1.829 m (6' 0.01\"), weight 63.05 kg (139 lb), SpO2 95 %.  General:  Well nourished, well developed male in NAD.  Pulmonary: Clear to ausculation and percussion.  Normal effort. No rales, rhonchi, or wheezing.  Cardiovascular: Regular rate and rhythm without murmur.   Abdomen: Normal bowel sounds soft and nontender no palpable liver spleen bladder mass.  Extremities: No LE edema noted.  Neuro: Grossly nonfocal.  Psych: Alert and oriented to person, place, and time. Appropriate mood and conversation.    CT findings: Results reviewed and discussed with the patient, questions answered.      This dictation was created using voice recognition software. I have made reasonable attempts to correct errors, however, errors of grammar and content may " exist.          Assessment/Plan:    1. Chronic obstructive pulmonary disease with acute exacerbation (CMS-HCC)  Continue with pulmonary will be due for repeat CT in December he is working on stopping smoking clinically stable without inhalers    2. Tobacco abuse  Extensive discussion held regarding behavior modification option for patches stress management. Patient declines additional medication other than the nicotine patches.     3. Health care maintenance  Patient not had lab work done for years discussed indications schedule  - COMP METABOLIC PANEL; Future  - CBC WITH DIFFERENTIAL; Future  - VITAMIN D,25 HYDROXY; Future  - PROSTATE SPECIFIC AG SCREENING; Future    4. Hyperlipidemia, unspecified hyperlipidemia type  Patient continues on [address] way overdue for labs we'll schedule this  - LIPID PROFILE; Future    Patient was seen for  25 minutes face to face of which more than 50% of the time was spent in counseling and coordination of care regarding the above problems.

## 2017-07-07 NOTE — MR AVS SNAPSHOT
"        Kobe Badox   2017 4:00 PM   Office Visit   MRN: [id number]    Department:  [facility]   Dept Phone:  [phone]    Description:  Male : 1950   Provider:  Princess ELAM M.D.           Reason for Visit     Follow-Up pulmonary, chest x-rays    Other colonoscopy if  thinks he needs one       Allergies as of 2017     No Known Allergies      You were diagnosed with     Chronic obstructive pulmonary disease with acute exacerbation (CMS-[facility])   [830949]       Tobacco abuse   [234598]       Health care maintenance   [860850]       Hyperlipidemia, unspecified hyperlipidemia type   [5804381]         Vital Signs     Blood Pressure Pulse Temperature Height Weight Body Mass Index    138/80 mmHg 83 37.3 °C (99.1 °F) 1.829 m (6' 0.01\") 63.05 kg (139 lb) 18.85 kg/m2    Oxygen Saturation Smoking Status                95% Current Every Day Smoker          Basic Information     Date Of Birth Sex Race Ethnicity Preferred Language    1950 Male White Non- English      Your appointments     Oct 12, 2017  7:15 AM   Adult Draw/Collection with [facility]   [facility] (--)    [address] [email or web address] [address]   [phone]            Oct 19, 2017  7:30 AM   Established Patient with Princess ELAM M.D.   [facility] (--)    [address]  [place] [id number] [phone]           You will be receiving a confirmation call a few days before your appointment from our automated call confirmation system.            2018  9:40 AM   Established Patient Pul with Ilsa Simmons M.D.   [facility] (--)    [address]  [address]  [facility] [id number] [phone]            May 30, 2018  9:15 AM   Follow Up Visit with Gabino Marcus M.D.   [facility] (--)    [address]  [place] [address] [phone]           You will be receiving a confirmation call a few days before your " appointment from our automated call confirmation system.              Problem List              ICD-10-CM Priority Class Noted - Resolved    Tobacco abuse Z72.0   Unknown - Present    Chronic obstructive pulmonary disease with acute exacerbation (CMS-[facility]) J44.1   3/30/2017 - Present    Dental caries K02.9   3/30/2017 - Present    Cervical spine disease M48.9   3/30/2017 - Present    Cerebral microvascular disease I67.9   3/30/2017 - Present      Health Maintenance        Date Due Completion Dates    IMM DTaP/Tdap/Td Vaccine (1 - Tdap) 8/28/1970 ---    COLONOSCOPY 8/28/2001 ---    IMM ZOSTER VACCINE 8/28/2011 ---    IMM PNEUMOCOCCAL 65+ (ADULT) LOW/MEDIUM RISK SERIES (1 of 2 - PCV13) 8/28/2016 ---    IMM INFLUENZA (1) 9/1/2017 ---            Current Immunizations     Influenza TIV (IM)  Deferred (Patient Refused)    Pneumococcal polysaccharide vaccine (PPSV-23)  Deferred (Patient Refused)      Below and/or attached are the medications your provider expects you to take. Review all of your home medications and newly ordered medications with your provider and/or pharmacist. Follow medication instructions as directed by your provider and/or pharmacist. Please keep your medication list with you and share with your provider. Update the information when medications are discontinued, doses are changed, or new medications (including over-the-counter products) are added; and carry medication information at all times in the event of emergency situations     Allergies:  No Known Allergies          Medications  Valid as of: July 07, 2017 -  4:37 PM    Generic Name Brand Name Tablet Size Instructions for use    Amoxicillin-Pot Clavulanate (Tab) AUGMENTIN 875-125 MG         Aspirin (Tablet Delayed Response) ECOTRIN 325 MG Take 1 Tab by mouth every day.        Guaifenesin-Codeine (Solution) ROBITUSSIN -10 mg/5mL Take 5 mL by mouth at bedtime as needed for Cough.        Ipratropium-Albuterol (Solution) DUONEB 0.5-2.5 (3) MG/3ML  3 mL by Nebulization route every 6 hours as needed for Shortness of Breath.        Lovastatin (Tab) MEVACOR 20 MG Take 1 Tab by mouth every day.        Varenicline Tartrate (Misc) CHANTIX MI 0.5 MG X 11 & 1 MG X 42 As dir        .                 Medicines prescribed today were sent to:     [facility] [place], [place] - [address]    [address] [place] [address]    Phone: [phone] Fax: [phone]    Open 24 Hours?: No      Medication refill instructions:       If your prescription bottle indicates you have medication refills left, it is not necessary to call your provider’s office. Please contact your pharmacy and they will refill your medication.    If your prescription bottle indicates you do not have any refills left, you may request refills at any time through one of the following ways: The online [email or web address] system (except Urgent Care), by calling your provider’s office, or by asking your pharmacy to contact your provider’s office with a refill request. Medication refills are processed only during regular business hours and may not be available until the next business day. Your provider may request additional information or to have a follow-up visit with you prior to refilling your medication.   *Please Note: Medication refills are assigned a new Rx number when refilled electronically. Your pharmacy may indicate that no refills were authorized even though a new prescription for the same medication is available at the pharmacy. Please request the medicine by name with the pharmacy before contacting your provider for a refill.        Your To Do List     Future Labs/Procedures Complete By Expires    CBC WITH DIFFERENTIAL  As directed 7/7/2018    COMP METABOLIC PANEL  As directed 7/7/2018    LIPID PROFILE  As directed 7/7/2018    PROSTATE SPECIFIC AG SCREENING  As directed 7/7/2018    VITAMIN D,25 HYDROXY  As directed 7/7/2018         [email or web address] Access Code: Activation code not  generated  Current MyChart Status: Active          Quit Tobacco Information     Do you want to quit using tobacco?    Quitting tobacco decreases risks of cancer, heart and lung disease, increases life expectancy, improves sense of taste and smell, and increases spending money, among other benefits.    If you are thinking about quitting, we can help.  • [facility] Quit Tobacco Program: 728.238.1076  o Program occurs weekly for four weeks and includes pharmacist consultation on products to support quitting smoking or chewing tobacco. A provider referral is needed for pharmacist consultation.  • Tobacco Users Help Hotline: 0-580-QUITNOW (131-0545) or https://[place].quitlogix.org/  o Free, confidential telephone and online coaching for [place] residents. Sessions are designed on a schedule that is convenient for you. Eligible clients receive free nicotine replacement therapy.  • Nationally: www.smokefree.gov  o Information and professional assistance to support both immediate and long-term needs as you become, and remain, a non-smoker. Smokefree.gov allows you to choose the help that best fits your needs.

## 2017-07-26 ENCOUNTER — HOSPITAL ENCOUNTER (OUTPATIENT)
Facility: MEDICAL CENTER | Age: 67
End: 2017-07-26
Attending: PHYSICIAN ASSISTANT
Payer: MEDICARE

## 2017-07-26 ENCOUNTER — OFFICE VISIT (OUTPATIENT)
Dept: MEDICAL GROUP | Facility: PHYSICIAN GROUP | Age: 67
End: 2017-07-26
Payer: MEDICARE

## 2017-07-26 ENCOUNTER — OFFICE VISIT (OUTPATIENT)
Dept: URGENT CARE | Facility: PHYSICIAN GROUP | Age: 67
End: 2017-07-26
Payer: MEDICARE

## 2017-07-26 VITALS
RESPIRATION RATE: 16 BRPM | OXYGEN SATURATION: 96 % | DIASTOLIC BLOOD PRESSURE: 82 MMHG | BODY MASS INDEX: 18.96 KG/M2 | HEIGHT: 72 IN | TEMPERATURE: 99 F | HEART RATE: 94 BPM | SYSTOLIC BLOOD PRESSURE: 140 MMHG | WEIGHT: 140 LBS

## 2017-07-26 VITALS
TEMPERATURE: 99 F | SYSTOLIC BLOOD PRESSURE: 140 MMHG | WEIGHT: 140 LBS | HEIGHT: 72 IN | OXYGEN SATURATION: 96 % | HEART RATE: 94 BPM | RESPIRATION RATE: 16 BRPM | BODY MASS INDEX: 18.96 KG/M2 | DIASTOLIC BLOOD PRESSURE: 82 MMHG

## 2017-07-26 DIAGNOSIS — L02.11 ABSCESS OF SKIN OF NECK: ICD-10-CM

## 2017-07-26 DIAGNOSIS — L02.11 CUTANEOUS ABSCESS OF NECK: ICD-10-CM

## 2017-07-26 PROCEDURE — 87205 SMEAR GRAM STAIN: CPT

## 2017-07-26 PROCEDURE — 10061 I&D ABSCESS COMP/MULTIPLE: CPT | Performed by: PHYSICIAN ASSISTANT

## 2017-07-26 PROCEDURE — 87070 CULTURE OTHR SPECIMN AEROBIC: CPT

## 2017-07-26 ASSESSMENT — ENCOUNTER SYMPTOMS
ROS SKIN COMMENTS: ABSCESS
CHILLS: 0
FEVER: 0

## 2017-07-26 ASSESSMENT — PAIN SCALES - GENERAL: PAINLEVEL: 8=MODERATE-SEVERE PAIN

## 2017-07-26 NOTE — MR AVS SNAPSHOT
Kobe Morris   2017 3:30 PM   Office Visit   MRN: [id number]    Department:  [facility]   Dept Phone:  [phone]    Description:  Male : 1950   Provider:  SHAYY Khan           Reason for Visit     Cyst on back of neck x1week       Allergies as of 2017     No Known Allergies      You were diagnosed with     Abscess of skin of neck   [[id number]]   Incised and drained with packing placed. Follow-up in 1-2 days for packing change. Culture sent to lab.      Vital Signs     Blood Pressure Pulse Temperature Respirations Height Weight    140/82 mmHg 94 37.2 °C (99 °F) 16 1.829 m (6') 63.504 kg (140 lb)    Body Mass Index Oxygen Saturation Smoking Status             18.98 kg/m2 96% Current Every Day Smoker         Basic Information     Date Of Birth Sex Race Ethnicity Preferred Language    1950 Male White Non- English      Your appointments     Oct 12, 2017  7:15 AM   Adult Draw/Collection with [facility]   [facility] (--)    [address] [place] [address]   [phone]            Oct 19, 2017  7:30 AM   Established Patient with Princess ELAM M.D.   [facility] (--)    [address] [place] [address] [phone]           You will be receiving a confirmation call a few days before your appointment from our automated call confirmation system.            2018  9:40 AM   Established Patient Pul with Ilsa Simmons M.D.   [facility] (--)    [address]  [address]  [place] [id number] [phone]            May 30, 2018  9:15 AM   Follow Up Visit with Gabino Marcus M.D.   [facility] (--)    [address]  [place] [address] [phone]           You will be receiving a confirmation call a few days before your appointment from our automated call confirmation system.              Problem List              ICD-10-CM Priority Class Noted - Resolved    Tobacco abuse Z72.0   Unknown - Present    Chronic obstructive pulmonary disease with acute exacerbation (CMS-HCC) J44.1   3/30/2017 - Present    Dental caries K02.9   3/30/2017 - Present    Cervical spine disease M48.9   3/30/2017 - Present    Cerebral microvascular disease I67.9   3/30/2017 - Present      Health Maintenance        Date Due Completion Dates    IMM DTaP/Tdap/Td Vaccine (1 - Tdap) 8/28/1970 ---    COLONOSCOPY 8/28/2001 ---    IMM ZOSTER VACCINE 8/28/2011 ---    IMM PNEUMOCOCCAL 65+ (ADULT) LOW/MEDIUM RISK SERIES (1 of 2 - PCV13) 8/28/2016 ---    IMM INFLUENZA (1) 9/1/2017 ---            Current Immunizations     Influenza TIV (IM)  Deferred (Patient Refused)    Pneumococcal polysaccharide vaccine (PPSV-23)  Deferred (Patient Refused)      Below and/or attached are the medications your provider expects you to take. Review all of your home medications and newly ordered medications with your provider and/or pharmacist. Follow medication instructions as directed by your provider and/or pharmacist. Please keep your medication list with you and share with your provider. Update the information when medications are discontinued, doses are changed, or new medications (including over-the-counter products) are added; and carry medication information at all times in the event of emergency situations     Allergies:  No Known Allergies          Medications  Valid as of: July 26, 2017 -  4:28 PM    Generic Name Brand Name Tablet Size Instructions for use    Aspirin (Tablet Delayed Response) ECOTRIN 325 MG Take 1 Tab by mouth every day.        Ipratropium-Albuterol (Solution) DUONEB 0.5-2.5 (3) MG/3ML 3 mL by Nebulization route every 6 hours as needed for Shortness of Breath.        Lovastatin (Tab) MEVACOR 20 MG Take 1 Tab by mouth every day.        .                 Medicines prescribed today were sent to:     [facility] - [place] - [address]    [address] [place]  [address]    Phone: [phone] Fax: [phone]    Open 24 Hours?: No      Medication refill instructions:       If your prescription bottle indicates you have medication refills left, it is not necessary to call your provider’s office. Please contact your pharmacy and they will refill your medication.    If your prescription bottle indicates you do not have any refills left, you may request refills at any time through one of the following ways: The online [employer] system (except Urgent Care), by calling your provider’s office, or by asking your pharmacy to contact your provider’s office with a refill request. Medication refills are processed only during regular business hours and may not be available until the next business day. Your provider may request additional information or to have a follow-up visit with you prior to refilling your medication.   *Please Note: Medication refills are assigned a new Rx number when refilled electronically. Your pharmacy may indicate that no refills were authorized even though a new prescription for the same medication is available at the pharmacy. Please request the medicine by name with the pharmacy before contacting your provider for a refill.        Your To Do List     Future Labs/Procedures Complete By Expires    CULTURE WOUND W/ GRAM STAIN  As directed 7/26/2018      Instructions      Abscess  Care After  An abscess (also called a boil or furuncle) is an infected area that contains a collection of pus. Signs and symptoms of an abscess include pain, tenderness, redness, or hardness, or you may feel a moveable soft area under your skin. An abscess can occur anywhere in the body. The infection may spread to surrounding tissues causing cellulitis. A cut (incision) by the surgeon was made over your abscess and the pus was drained out. Gauze may have been packed into the space to provide a drain that will allow the cavity to heal from the inside outwards. The boil may be painful for  5 to 7 days. Most people with a boil do not have high fevers. Your abscess, if seen early, may not have localized, and may not have been lanced. If not, another appointment may be required for this if it does not get better on its own or with medications.  HOME CARE INSTRUCTIONS   · Only take over-the-counter or prescription medicines for pain, discomfort, or fever as directed by your caregiver.  · When you bathe, soak and then remove gauze or iodoform packs at least daily or as directed by your caregiver. You may then wash the wound gently with mild soapy water. Repack with gauze or do as your caregiver directs.  SEEK IMMEDIATE MEDICAL CARE IF:   · You develop increased pain, swelling, redness, drainage, or bleeding in the wound site.  · You develop signs of generalized infection including muscle aches, chills, fever, or a general ill feeling.  · An oral temperature above 102° F (38.9° C) develops, not controlled by medication.  See your caregiver for a recheck if you develop any of the symptoms described above. If medications (antibiotics) were prescribed, take them as directed.  Document Released: 07/06/2006 Document Revised: 03/11/2013 Document Reviewed: 03/02/2009  [employer]® Patient Information ©2014 [employer].      Smoking Cessation, Tips for Success  If you are ready to quit smoking, congratulations! You have chosen to help yourself be healthier. Cigarettes bring nicotine, tar, carbon monoxide, and other irritants into your body. Your lungs, heart, and blood vessels will be able to work better without these poisons. There are many different ways to quit smoking. Nicotine gum, nicotine patches, a nicotine inhaler, or nicotine nasal spray can help with physical craving. Hypnosis, support groups, and medicines help break the habit of smoking.  WHAT THINGS CAN I DO TO MAKE QUITTING EASIER?   Here are some tips to help you quit for good:  · Pick a date when you will quit smoking completely. Tell all of your  "friends and family about your plan to quit on that date.  · Do not try to slowly cut down on the number of cigarettes you are smoking. Pick a quit date and quit smoking completely starting on that day.  · Throw away all cigarettes.    · Clean and remove all ashtrays from your home, work, and car.  · On a card, write down your reasons for quitting. Carry the card with you and read it when you get the urge to smoke.  · Cleanse your body of nicotine. Drink enough water and fluids to keep your urine clear or pale yellow. Do this after quitting to flush the nicotine from your body.  · Learn to predict your moods. Do not let a bad situation be your excuse to have a cigarette. Some situations in your life might tempt you into wanting a cigarette.  · Never have \"just one\" cigarette. It leads to wanting another and another. Remind yourself of your decision to quit.  · Change habits associated with smoking. If you smoked while driving or when feeling stressed, try other activities to replace smoking. Stand up when drinking your coffee. Brush your teeth after eating. Sit in a different chair when you read the paper. Avoid alcohol while trying to quit, and try to drink fewer caffeinated beverages. Alcohol and caffeine may urge you to smoke.  · Avoid foods and drinks that can trigger a desire to smoke, such as sugary or spicy foods and alcohol.  · Ask people who smoke not to smoke around you.  · Have something planned to do right after eating or having a cup of coffee. For example, plan to take a walk or exercise.  · Try a relaxation exercise to calm you down and decrease your stress. Remember, you may be tense and nervous for the first 2 weeks after you quit, but this will pass.  · Find new activities to keep your hands busy. Play with a pen, coin, or rubber band. Doodle or draw things on paper.  · Brush your teeth right after eating. This will help cut down on the craving for the taste of tobacco after meals. You can also try " "mouthwash.    · Use oral substitutes in place of cigarettes. Try using lemon drops, carrots, cinnamon sticks, or chewing gum. Keep them handy so they are available when you have the urge to smoke.  · When you have the urge to smoke, try deep breathing.  · Designate your home as a nonsmoking area.  · If you are a heavy smoker, ask your health care provider about a prescription for nicotine chewing gum. It can ease your withdrawal from nicotine.  · Reward yourself. Set aside the cigarette money you save and buy yourself something nice.  · Look for support from others. Join a support group or smoking cessation program. Ask someone at home or at work to help you with your plan to quit smoking.  · Always ask yourself, \"Do I need this cigarette or is this just a reflex?\" Tell yourself, \"Today, I choose not to smoke,\" or \"I do not want to smoke.\" You are reminding yourself of your decision to quit.  · Do not replace cigarette smoking with electronic cigarettes (commonly called e-cigarettes). The safety of e-cigarettes is unknown, and some may contain harmful chemicals.  · If you relapse, do not give up! Plan ahead and think about what you will do the next time you get the urge to smoke.  HOW WILL I FEEL WHEN I QUIT SMOKING?  You may have symptoms of withdrawal because your body is used to nicotine (the addictive substance in cigarettes). You may crave cigarettes, be irritable, feel very hungry, cough often, get headaches, or have difficulty concentrating. The withdrawal symptoms are only temporary. They are strongest when you first quit but will go away within 10-14 days. When withdrawal symptoms occur, stay in control. Think about your reasons for quitting. Remind yourself that these are signs that your body is healing and getting used to being without cigarettes. Remember that withdrawal symptoms are easier to treat than the major diseases that smoking can cause.   Even after the withdrawal is over, expect periodic urges " to smoke. However, these cravings are generally short lived and will go away whether you smoke or not. Do not smoke!  WHAT RESOURCES ARE AVAILABLE TO HELP ME QUIT SMOKING?  Your health care provider can direct you to community resources or hospitals for support, which may include:  · Group support.  · Education.  · Hypnosis.  · Therapy.     This information is not intended to replace advice given to you by your health care provider. Make sure you discuss any questions you have with your health care provider.     Document Released: 09/15/2005 Document Revised: 01/08/2016 Document Reviewed: 06/05/2014  [employer] Interactive Patient Education ©2016 [employer].            [employer] Access Code: Activation code not generated  Current [employer] Status: Active          Quit Tobacco Information     Do you want to quit using tobacco?    Quitting tobacco decreases risks of cancer, heart and lung disease, increases life expectancy, improves sense of taste and smell, and increases spending money, among other benefits.    If you are thinking about quitting, we can help.  • [facility] Quit Tobacco Program: [phone]  o Program occurs weekly for four weeks and includes pharmacist consultation on products to support quitting smoking or chewing tobacco. A provider referral is needed for pharmacist consultation.  • Tobacco Users Help Hotline: 2-120-QUIT-NOW (618-6637) or https://[place].quitlogix.org/  o Free, confidential telephone and online coaching for [place] residents. Sessions are designed on a schedule that is convenient for you. Eligible clients receive free nicotine replacement therapy.  • Nationally: www.smokefree.gov  o Information and professional assistance to support both immediate and long-term needs as you become, and remain, a non-smoker. Smokefree.gov allows you to choose the help that best fits your needs.

## 2017-07-26 NOTE — PATIENT INSTRUCTIONS
Abscess  Care After  An abscess (also called a boil or furuncle) is an infected area that contains a collection of pus. Signs and symptoms of an abscess include pain, tenderness, redness, or hardness, or you may feel a moveable soft area under your skin. An abscess can occur anywhere in the body. The infection may spread to surrounding tissues causing cellulitis. A cut (incision) by the surgeon was made over your abscess and the pus was drained out. Gauze may have been packed into the space to provide a drain that will allow the cavity to heal from the inside outwards. The boil may be painful for 5 to 7 days. Most people with a boil do not have high fevers. Your abscess, if seen early, may not have localized, and may not have been lanced. If not, another appointment may be required for this if it does not get better on its own or with medications.  HOME CARE INSTRUCTIONS   · Only take over-the-counter or prescription medicines for pain, discomfort, or fever as directed by your caregiver.  · When you bathe, soak and then remove gauze or iodoform packs at least daily or as directed by your caregiver. You may then wash the wound gently with mild soapy water. Repack with gauze or do as your caregiver directs.  SEEK IMMEDIATE MEDICAL CARE IF:   · You develop increased pain, swelling, redness, drainage, or bleeding in the wound site.  · You develop signs of generalized infection including muscle aches, chills, fever, or a general ill feeling.  · An oral temperature above 102° F (38.9° C) develops, not controlled by medication.  See your caregiver for a recheck if you develop any of the symptoms described above. If medications (antibiotics) were prescribed, take them as directed.  Document Released: 07/06/2006 Document Revised: 03/11/2013 Document Reviewed: 03/02/2009  [employer]® Patient Information ©2014 [employer].      Smoking Cessation, Tips for Success  If you are ready to quit smoking, congratulations! You have  "chosen to help yourself be healthier. Cigarettes bring nicotine, tar, carbon monoxide, and other irritants into your body. Your lungs, heart, and blood vessels will be able to work better without these poisons. There are many different ways to quit smoking. Nicotine gum, nicotine patches, a nicotine inhaler, or nicotine nasal spray can help with physical craving. Hypnosis, support groups, and medicines help break the habit of smoking.  WHAT THINGS CAN I DO TO MAKE QUITTING EASIER?   Here are some tips to help you quit for good:  · Pick a date when you will quit smoking completely. Tell all of your friends and family about your plan to quit on that date.  · Do not try to slowly cut down on the number of cigarettes you are smoking. Pick a quit date and quit smoking completely starting on that day.  · Throw away all cigarettes.    · Clean and remove all ashtrays from your home, work, and car.  · On a card, write down your reasons for quitting. Carry the card with you and read it when you get the urge to smoke.  · Cleanse your body of nicotine. Drink enough water and fluids to keep your urine clear or pale yellow. Do this after quitting to flush the nicotine from your body.  · Learn to predict your moods. Do not let a bad situation be your excuse to have a cigarette. Some situations in your life might tempt you into wanting a cigarette.  · Never have \"just one\" cigarette. It leads to wanting another and another. Remind yourself of your decision to quit.  · Change habits associated with smoking. If you smoked while driving or when feeling stressed, try other activities to replace smoking. Stand up when drinking your coffee. Brush your teeth after eating. Sit in a different chair when you read the paper. Avoid alcohol while trying to quit, and try to drink fewer caffeinated beverages. Alcohol and caffeine may urge you to smoke.  · Avoid foods and drinks that can trigger a desire to smoke, such as sugary or spicy foods and " "alcohol.  · Ask people who smoke not to smoke around you.  · Have something planned to do right after eating or having a cup of coffee. For example, plan to take a walk or exercise.  · Try a relaxation exercise to calm you down and decrease your stress. Remember, you may be tense and nervous for the first 2 weeks after you quit, but this will pass.  · Find new activities to keep your hands busy. Play with a pen, coin, or rubber band. Doodle or draw things on paper.  · Brush your teeth right after eating. This will help cut down on the craving for the taste of tobacco after meals. You can also try mouthwash.    · Use oral substitutes in place of cigarettes. Try using lemon drops, carrots, cinnamon sticks, or chewing gum. Keep them handy so they are available when you have the urge to smoke.  · When you have the urge to smoke, try deep breathing.  · Designate your home as a nonsmoking area.  · If you are a heavy smoker, ask your health care provider about a prescription for nicotine chewing gum. It can ease your withdrawal from nicotine.  · Reward yourself. Set aside the cigarette money you save and buy yourself something nice.  · Look for support from others. Join a support group or smoking cessation program. Ask someone at home or at work to help you with your plan to quit smoking.  · Always ask yourself, \"Do I need this cigarette or is this just a reflex?\" Tell yourself, \"Today, I choose not to smoke,\" or \"I do not want to smoke.\" You are reminding yourself of your decision to quit.  · Do not replace cigarette smoking with electronic cigarettes (commonly called e-cigarettes). The safety of e-cigarettes is unknown, and some may contain harmful chemicals.  · If you relapse, do not give up! Plan ahead and think about what you will do the next time you get the urge to smoke.  HOW WILL I FEEL WHEN I QUIT SMOKING?  You may have symptoms of withdrawal because your body is used to nicotine (the addictive substance in " cigarettes). You may crave cigarettes, be irritable, feel very hungry, cough often, get headaches, or have difficulty concentrating. The withdrawal symptoms are only temporary. They are strongest when you first quit but will go away within 10-14 days. When withdrawal symptoms occur, stay in control. Think about your reasons for quitting. Remind yourself that these are signs that your body is healing and getting used to being without cigarettes. Remember that withdrawal symptoms are easier to treat than the major diseases that smoking can cause.   Even after the withdrawal is over, expect periodic urges to smoke. However, these cravings are generally short lived and will go away whether you smoke or not. Do not smoke!  WHAT RESOURCES ARE AVAILABLE TO HELP ME QUIT SMOKING?  Your health care provider can direct you to community resources or hospitals for support, which may include:  · Group support.  · Education.  · Hypnosis.  · Therapy.     This information is not intended to replace advice given to you by your health care provider. Make sure you discuss any questions you have with your health care provider.     Document Released: 09/15/2005 Document Revised: 01/08/2016 Document Reviewed: 06/05/2014  [employer] Interactive Patient Education ©2016 [employer]

## 2017-07-26 NOTE — PROGRESS NOTES
Subjective:      Kobe Morris is a 66 y.o. male who presents with Cyst            HPI Comments: Patient reports that he has had a small bump on the left side of his neck for quite a while. Over the last week it has increased in size dramatically and is now quite tender. Denies any other complaints    Cyst  This is a new problem. The current episode started in the past 7 days. The problem occurs constantly. The problem has been gradually worsening. Pertinent negatives include no chills or fever. Exacerbated by: palpation. He has tried nothing for the symptoms. The treatment provided no relief.       Review of Systems   Constitutional: Negative for fever and chills.   Skin:        Abscess     Allergies:Review of patient's allergies indicates no known allergies.    Current Outpatient Prescriptions Ordered in [facility]   Medication Sig Dispense Refill   • ipratropium-albuterol (DUONEB) 0.5-2.5 (3) MG/3ML nebulizer solution 3 mL by Nebulization route every 6 hours as needed for Shortness of Breath. 120 Vial 0   • lovastatin (MEVACOR) 20 MG Tab Take 1 Tab by mouth every day. 30 Tab 6   • aspirin EC (ECOTRIN) 325 MG Tablet Delayed Response Take 1 Tab by mouth every day. 30 Tab 6     No current Epic-ordered facility-administered medications on file.       Past Medical History   Diagnosis Date   • Tobacco abuse    • Chronic obstructive pulmonary disease with acute exacerbation (CMS-HCC) 3/30/2017   • Dental caries 3/30/2017   • Cervical spine disease 3/30/2017   • Pneumonia    • Tonsillitis        Social History   Substance Use Topics   • Smoking status: Current Every Day Smoker -- 0.50 packs/day for 48 years     Types: Cigarettes   • Smokeless tobacco: Never Used      Comment: Started smoking in    • Alcohol Use: 1.0 oz/week     2 Cans of beer per week      Comment: Average per weekday 20 Average per weekend 6       Family Status   Relation Status Death Age   • Father     • Mother Alive    • Brother Alive      Family  "History   Problem Relation Age of Onset   • Cancer Father      lung   • No Known Problems Mother    • No Known Problems Brother           Objective:     /82 mmHg  Pulse 94  Temp(Src) 37.2 °C (99 °F)  Resp 16  Ht 1.829 m (6')  Wt 63.504 kg (140 lb)  BMI 18.98 kg/m2  SpO2 96%     Physical Exam   Constitutional: He is oriented to person, place, and time. He appears well-developed and well-nourished. No distress.   HENT:   Head: Normocephalic and atraumatic.   Neck: Normal range of motion. Neck supple.   Cardiovascular: Normal rate.    Pulmonary/Chest: Effort normal.   Neurological: He is alert and oriented to person, place, and time.   Skin: Skin is warm and dry. He is not diaphoretic.   Large, fluctuant, erythematous mass of the posterior aspect of the left side of the neck with small amount of purulent discharge visible.   Psychiatric: He has a normal mood and affect. His behavior is normal. Judgment and thought content normal.   Nursing note and vitals reviewed.              Assessment/Plan:     1. Abscess of skin of neck  CULTURE WOUND W/ GRAM STAIN    Incised and drained with packing placed. Follow-up in 1-2 days for packing change. Culture sent to lab.     Procedure: Incision and Drainage  -Risks, benefits, and alternatives discussed. Risks including infection, bleeding, nerve damage, and poor cosmetic outcome  -Local anesthesia with 2% lidocaine with epinephrine  -Incision with #11 blade into fluctuant area with purulent material expressed  -Culture obtained and packaged for lab  -Cavity probed and any loculations bluntly taken down with hemostat  -Irrigated copiously with NS  -Packed with 1/4\" gauze  -Minimal bleeding with good hemostasis achieved  -The patient tolerated the procedure well        [employer] Interactive Patient Education given: Abscess after care    Please note that this dictation was created using voice recognition software. I have made every reasonable attempt to correct obvious " errors, but I expect that there are errors of grammar and possibly content that I did not discover before finalizing the note.

## 2017-07-27 DIAGNOSIS — L02.11 ABSCESS OF SKIN OF NECK: ICD-10-CM

## 2017-07-28 ENCOUNTER — OFFICE VISIT (OUTPATIENT)
Dept: URGENT CARE | Facility: PHYSICIAN GROUP | Age: 67
End: 2017-07-28
Payer: MEDICARE

## 2017-07-28 VITALS
SYSTOLIC BLOOD PRESSURE: 124 MMHG | TEMPERATURE: 98.3 F | OXYGEN SATURATION: 96 % | HEART RATE: 83 BPM | BODY MASS INDEX: 18.96 KG/M2 | WEIGHT: 140 LBS | DIASTOLIC BLOOD PRESSURE: 76 MMHG | RESPIRATION RATE: 16 BRPM | HEIGHT: 72 IN

## 2017-07-28 DIAGNOSIS — L02.11 ABSCESS OF SKIN OF NECK: ICD-10-CM

## 2017-07-28 LAB
GRAM STN SPEC: NORMAL
SIGNIFICANT IND 70042: NORMAL
SITE SITE: NORMAL
SOURCE SOURCE: NORMAL

## 2017-07-28 PROCEDURE — 99024 POSTOP FOLLOW-UP VISIT: CPT | Performed by: PHYSICIAN ASSISTANT

## 2017-07-28 NOTE — PROGRESS NOTES
No change in med hx, surg hx, allergy hx, or current meds since previous visit    HPI: abscess recheck    ROS: abscess site: Patient indicates much improvement in pain, swelling and irritation    Assessment: Wound clean, healing well, no s/s of active infection, irrigated and repacked    Office visit for abscess encounter reviewed for results, cx reviewed and not available at this point    Plan: discussed wound care f/u as sched, cont meds as RX. F/u in 3 days

## 2017-07-28 NOTE — MR AVS SNAPSHOT
"Kobe Morris   2017 9:00 AM   Office Visit   MRN: [id number]    Department:  [facility]   Dept Phone:  [phone]    Description:  Male : 1950   Provider:  Jp Piña PA-C           Reason for Visit     Wound Check           Allergies as of 2017     No Known Allergies      You were diagnosed with     Abscess of skin of neck   [520778]         Vital Signs     Blood Pressure Pulse Temperature Respirations Height Weight    124/76 mmHg 83 36.8 °C (98.3 °F) 16 1.829 m (6' 0.01\") 63.504 kg (140 lb)    Body Mass Index Oxygen Saturation Smoking Status             18.98 kg/m2 96% Current Every Day Smoker         Basic Information     Date Of Birth Sex Race Ethnicity Preferred Language    1950 Male White Non- English      Your appointments     Oct 12, 2017  7:15 AM   Adult Draw/Collection with [facility]   [facility] (--)    [address] [place] [address]   [phone]            Oct 19, 2017  7:30 AM   Established Patient with Princess ELAM M.D.   [facility] (--)    [address] [place] [id number] [phone]           You will be receiving a confirmation call a few days before your appointment from our automated call confirmation system.            2018  9:40 AM   Established Patient Pul with Ilsa Simmons M.D.   [facility]monary [facility] (--)    [address]  [address]  [place] [id number] [phone]            May 30, 2018  9:15 AM   Follow Up Visit with Gabino Marcus M.D.   [facility] PHYSIAT[facility] (--)    [address]  [place] [address] [phone]           You will be receiving a confirmation call a few days before your appointment from our automated call confirmation system.              Problem List              ICD-10-CM Priority Class Noted - Resolved    Tobacco abuse Z72.0   Unknown - Present    Chronic obstructive pulmonary disease with acute exacerbation " (CMS-[facility]) J44.1   3/30/2017 - Present    Dental caries K02.9   3/30/2017 - Present    Cervical spine disease M48.9   3/30/2017 - Present    Cerebral microvascular disease I67.9   3/30/2017 - Present    Cutaneous abscess of neck L02.11   7/27/2017 - Present      Health Maintenance        Date Due Completion Dates    IMM DTaP/Tdap/Td Vaccine (1 - Tdap) 8/28/1970 ---    COLONOSCOPY 8/28/2001 ---    IMM ZOSTER VACCINE 8/28/2011 ---    IMM PNEUMOCOCCAL 65+ (ADULT) LOW/MEDIUM RISK SERIES (1 of 2 - PCV13) 8/28/2016 ---    IMM INFLUENZA (1) 9/1/2017 ---            Current Immunizations     Influenza TIV (IM)  Deferred (Patient Refused)    Pneumococcal polysaccharide vaccine (PPSV-23)  Deferred (Patient Refused)      Below and/or attached are the medications your provider expects you to take. Review all of your home medications and newly ordered medications with your provider and/or pharmacist. Follow medication instructions as directed by your provider and/or pharmacist. Please keep your medication list with you and share with your provider. Update the information when medications are discontinued, doses are changed, or new medications (including over-the-counter products) are added; and carry medication information at all times in the event of emergency situations     Allergies:  No Known Allergies          Medications  Valid as of: July 28, 2017 -  9:54 AM    Generic Name Brand Name Tablet Size Instructions for use    Aspirin (Tablet Delayed Response) ECOTRIN 325 MG Take 1 Tab by mouth every day.        Ipratropium-Albuterol (Solution) DUONEB 0.5-2.5 (3) MG/3ML 3 mL by Nebulization route every 6 hours as needed for Shortness of Breath.        Lovastatin (Tab) MEVACOR 20 MG Take 1 Tab by mouth every day.        .                 Medicines prescribed today were sent to:     [facility] [place] - [address]    [address] [place] [address]    Phone: [phone] Fax: [phone]     Open 24 Hours?: No      Medication refill instructions:       If your prescription bottle indicates you have medication refills left, it is not necessary to call your provider’s office. Please contact your pharmacy and they will refill your medication.    If your prescription bottle indicates you do not have any refills left, you may request refills at any time through one of the following ways: The online bMenu system (except Urgent Care), by calling your provider’s office, or by asking your pharmacy to contact your provider’s office with a refill request. Medication refills are processed only during regular business hours and may not be available until the next business day. Your provider may request additional information or to have a follow-up visit with you prior to refilling your medication.   *Please Note: Medication refills are assigned a new Rx number when refilled electronically. Your pharmacy may indicate that no refills were authorized even though a new prescription for the same medication is available at the pharmacy. Please request the medicine by name with the pharmacy before contacting your provider for a refill.           bMenu Access Code: Activation code not generated  Current bMenu Status: Active          Quit Tobacco Information     Do you want to quit using tobacco?    Quitting tobacco decreases risks of cancer, heart and lung disease, increases life expectancy, improves sense of taste and smell, and increases spending money, among other benefits.    If you are thinking about quitting, we can help.  • [facility] Quit Tobacco Program: 888.933.1604  o Program occurs weekly for four weeks and includes pharmacist consultation on products to support quitting smoking or chewing tobacco. A provider referral is needed for pharmacist consultation.  • Tobacco Users Help Hotline: 3-800-QUIT-NOW (017-3656) or https://[place].quitlogix.org/  o Free, confidential telephone and online coaching for [place]  residents. Sessions are designed on a schedule that is convenient for you. Eligible clients receive free nicotine replacement therapy.  • Nationally: www.smokefree.gov  o Information and professional assistance to support both immediate and long-term needs as you become, and remain, a non-smoker. Smokefree.gov allows you to choose the help that best fits your needs.

## 2017-07-30 LAB
BACTERIA WND AEROBE CULT: ABNORMAL
BACTERIA WND AEROBE CULT: ABNORMAL
GRAM STN SPEC: ABNORMAL
SIGNIFICANT IND 70042: ABNORMAL
SITE SITE: ABNORMAL
SOURCE SOURCE: ABNORMAL

## 2017-07-31 ENCOUNTER — OFFICE VISIT (OUTPATIENT)
Dept: URGENT CARE | Facility: PHYSICIAN GROUP | Age: 67
End: 2017-07-31
Payer: MEDICARE

## 2017-07-31 VITALS
SYSTOLIC BLOOD PRESSURE: 122 MMHG | TEMPERATURE: 98.7 F | DIASTOLIC BLOOD PRESSURE: 74 MMHG | WEIGHT: 140 LBS | RESPIRATION RATE: 16 BRPM | OXYGEN SATURATION: 96 % | BODY MASS INDEX: 18.96 KG/M2 | HEIGHT: 72 IN | HEART RATE: 87 BPM

## 2017-07-31 DIAGNOSIS — Z51.89 WOUND CHECK, ABSCESS: ICD-10-CM

## 2017-07-31 PROCEDURE — 99024 POSTOP FOLLOW-UP VISIT: CPT | Performed by: PHYSICIAN ASSISTANT

## 2017-07-31 ASSESSMENT — ENCOUNTER SYMPTOMS: ROS SKIN COMMENTS: HEALING ABSCESS

## 2017-07-31 NOTE — MR AVS SNAPSHOT
"Kobe Morris   2017 9:30 AM   Office Visit   MRN: [id number]    Department:  [facility]   Dept Phone:  [phone]    Description:  Male : 1950   Provider:  SHAYY Khan           Reason for Visit     Wound Check           Allergies as of 2017     No Known Allergies      You were diagnosed with     Wound check, abscess   [000035]   Healing well. Packing removed and irrigated. Follow up as needed      Vital Signs     Blood Pressure Pulse Temperature Respirations Height Weight    122/74 mmHg 87 37.1 °C (98.7 °F) 16 1.829 m (6' 0.01\") 63.504 kg (140 lb)    Body Mass Index Oxygen Saturation Smoking Status             18.98 kg/m2 96% Current Every Day Smoker         Basic Information     Date Of Birth Sex Race Ethnicity Preferred Language    1950 Male White Non- English      Your appointments     Oct 12, 2017  7:15 AM   Adult Draw/Collection with [facility]   [facility] (--)    [address] [place] [address]   [phone]            Oct 19, 2017  7:30 AM   Established Patient with Princess ELAM M.D.   [facility] (--)    [address] [place] [id number] [phone]           You will be receiving a confirmation call a few days before your appointment from our automated call confirmation system.            2018  9:40 AM   Established Patient Pul with Ilsa Simmons M.D.   [facility]monary Medicine (--)    [address]  [address]  [place] [id number] [phone]            May 30, 2018  9:15 AM   Follow Up Visit with Gabino Marcus M.D.   [facility] PHYSIATRY (--)    [address]  [place] [address] [phone]           You will be receiving a confirmation call a few days before your appointment from our automated call confirmation system.              Problem List              ICD-10-CM Priority Class Noted - Resolved    Tobacco abuse Z72.0   Unknown - Present    Chronic " obstructive pulmonary disease with acute exacerbation (CMS-[facility]) J44.1   3/30/2017 - Present    Dental caries K02.9   3/30/2017 - Present    Cervical spine disease M48.9   3/30/2017 - Present    Cerebral microvascular disease I67.9   3/30/2017 - Present    Cutaneous abscess of neck L02.11   7/27/2017 - Present      Health Maintenance        Date Due Completion Dates    IMM DTaP/Tdap/Td Vaccine (1 - Tdap) 8/28/1970 ---    COLONOSCOPY 8/28/2001 ---    IMM ZOSTER VACCINE 8/28/2011 ---    IMM PNEUMOCOCCAL 65+ (ADULT) LOW/MEDIUM RISK SERIES (1 of 2 - PCV13) 8/28/2016 ---    IMM INFLUENZA (1) 9/1/2017 ---            Current Immunizations     Influenza TIV (IM)  Deferred (Patient Refused)    Pneumococcal polysaccharide vaccine (PPSV-23)  Deferred (Patient Refused)      Below and/or attached are the medications your provider expects you to take. Review all of your home medications and newly ordered medications with your provider and/or pharmacist. Follow medication instructions as directed by your provider and/or pharmacist. Please keep your medication list with you and share with your provider. Update the information when medications are discontinued, doses are changed, or new medications (including over-the-counter products) are added; and carry medication information at all times in the event of emergency situations     Allergies:  No Known Allergies          Medications  Valid as of: July 31, 2017 - 10:24 AM    Generic Name Brand Name Tablet Size Instructions for use    Aspirin (Tablet Delayed Response) ECOTRIN 325 MG Take 1 Tab by mouth every day.        Ipratropium-Albuterol (Solution) DUONEB 0.5-2.5 (3) MG/3ML 3 mL by Nebulization route every 6 hours as needed for Shortness of Breath.        Lovastatin (Tab) MEVACOR 20 MG Take 1 Tab by mouth every day.        .                 Medicines prescribed today were sent to:     [facility]  [place] - [address]    [address]  [place] [address]    Phone: [phone] Fax: [phone]    Open 24 Hours?: No      Medication refill instructions:       If your prescription bottle indicates you have medication refills left, it is not necessary to call your provider’s office. Please contact your pharmacy and they will refill your medication.    If your prescription bottle indicates you do not have any refills left, you may request refills at any time through one of the following ways: The online [employer] system (except Urgent Care), by calling your provider’s office, or by asking your pharmacy to contact your provider’s office with a refill request. Medication refills are processed only during regular business hours and may not be available until the next business day. Your provider may request additional information or to have a follow-up visit with you prior to refilling your medication.   *Please Note: Medication refills are assigned a new Rx number when refilled electronically. Your pharmacy may indicate that no refills were authorized even though a new prescription for the same medication is available at the pharmacy. Please request the medicine by name with the pharmacy before contacting your provider for a refill.        Instructions    Wound Check  If you have a wound, it may take some time to heal. Eventually, a scar will form. The scar will also fade with time. It is important to take care of your wound while it is healing. This helps to protect your wound from infection.   HOW SHOULD I TAKE CARE OF MY WOUND AT HOME?  · Some wounds are allowed to close on their own or are repaired at a later date. There are many different ways to close and cover a wound, including stitches (sutures), skin glue, and adhesive strips. Follow your health care provider's instructions about:  ¨ Wound care.  ¨ Bandage (dressing) changes and removal.  ¨ Wound closure removal.  · Take medicines only as directed by your health care provider.  · Keep all follow-up  visits as directed by your health care provider. This is important.  · Do not take baths, swim, or use a hot tub until your health care provider approves. You may shower as directed by your health care provider.  · Keep your wound clean and dry.  WHAT AFFECTS SCAR FORMATION?  Scars affect each person differently. How your body scars depends on:  · The location and size of your wound.  · Traits that you inherited from your parents (genetic predisposition).  · How you take care of your wound. Irritation and inflammation increase the amount of scar formation.  · Sun exposure. This can darken a scar.  WHEN SHOULD I CALL OR SEE MY HEALTH CARE PROVIDER?  Call or see your health care provider if:  · You have redness, swelling, or pain at your wound site.  · You have fluid, blood, or pus coming from your wound.  · You have muscle aches, chills, or a general ill feeling.  · You notice a bad smell coming from the wound.  · Your wound separates after the sutures, staples, or skin adhesive strips have been removed.  · You have persistent nausea or vomiting.  · You have a fever.  · You are dizzy.  WHEN SHOULD I CALL 911 OR GO TO THE EMERGENCY ROOM?  Call 911 or go to the emergency room if:  · You faint.  · You have difficulty breathing.     This information is not intended to replace advice given to you by your health care provider. Make sure you discuss any questions you have with your health care provider.     Document Released: 09/23/2005 Document Revised: 01/08/2016 Document Reviewed: 09/29/2015  [employer] Interactive Patient Education ©2016 [employer]vier Inc.            [employer] Access Code: Activation code not generated  Current [employer] Status: Active          Quit Tobacco Information     Do you want to quit using tobacco?    Quitting tobacco decreases risks of cancer, heart and lung disease, increases life expectancy, improves sense of taste and smell, and increases spending money, among other benefits.    If you are thinking  about quitting, we can help.  • [facility] Quit Tobacco Program: 250-853-4598  o Program occurs weekly for four weeks and includes pharmacist consultation on products to support quitting smoking or chewing tobacco. A provider referral is needed for pharmacist consultation.  • Tobacco Users Help Hotline: 2-800-QUIT-NOW (915-0003) or https://[place].quitlogix.org/  o Free, confidential telephone and online coaching for [place] residents. Sessions are designed on a schedule that is convenient for you. Eligible clients receive free nicotine replacement therapy.  • Nationally: www.smokefree.gov  o Information and professional assistance to support both immediate and long-term needs as you become, and remain, a non-smoker. Smokefree.gov allows you to choose the help that best fits your needs.

## 2017-07-31 NOTE — PROGRESS NOTES
Subjective:      Kobe Morris is a 66 y.o. male who presents with Wound Check            HPI Comments: Here for packing removal and wound check. Reports significant improvement in symptoms since initial treatment. No worsening symptoms.    Wound Check  He was originally treated 3 to 5 days ago. Previous treatment included I&D of abscess. His temperature was unmeasured prior to arrival. There has been colored discharge from the wound. The redness has improved. The swelling has improved. The pain has improved.       Review of Systems   Skin:        Healing abscess     Allergies:Review of patient's allergies indicates no known allergies.    Current Outpatient Prescriptions Ordered in [facility]   Medication Sig Dispense Refill   • ipratropium-albuterol (DUONEB) 0.5-2.5 (3) MG/3ML nebulizer solution 3 mL by Nebulization route every 6 hours as needed for Shortness of Breath. 120 Vial 0   • lovastatin (MEVACOR) 20 MG Tab Take 1 Tab by mouth every day. 30 Tab 6   • aspirin EC (ECOTRIN) 325 MG Tablet Delayed Response Take 1 Tab by mouth every day. 30 Tab 6     No current Epic-ordered facility-administered medications on file.       Past Medical History   Diagnosis Date   • Tobacco abuse    • Chronic obstructive pulmonary disease with acute exacerbation (CMS-HCC) 3/30/2017   • Dental caries 3/30/2017   • Cervical spine disease 3/30/2017   • Pneumonia    • Tonsillitis        Social History   Substance Use Topics   • Smoking status: Current Every Day Smoker -- 0.50 packs/day for 48 years     Types: Cigarettes   • Smokeless tobacco: Never Used      Comment: Started smoking in    • Alcohol Use: 1.0 oz/week     2 Cans of beer per week      Comment: Average per weekday 20 Average per weekend 6       Family Status   Relation Status Death Age   • Father     • Mother Alive    • Brother Alive      Family History   Problem Relation Age of Onset   • Cancer Father      lung   • No Known Problems Mother    • No Known Problems  "Brother           Objective:     /74 mmHg  Pulse 87  Temp(Src) 37.1 °C (98.7 °F)  Resp 16  Ht 1.829 m (6' 0.01\")  Wt 63.504 kg (140 lb)  BMI 18.98 kg/m2  SpO2 96%     Physical Exam   Constitutional: He is oriented to person, place, and time. He appears well-developed and well-nourished.   HENT:   Head: Normocephalic and atraumatic.   Cardiovascular: Normal rate.    Pulmonary/Chest: Effort normal.   Neurological: He is alert and oriented to person, place, and time.   Skin: Skin is warm and dry.   Healing abscess of the posterior aspect of the neck with packing in place. Dried, colored discharge present. No fluctuance. Very mild tenderness. Improving erythema and swelling   Psychiatric: He has a normal mood and affect. His behavior is normal. Judgment and thought content normal.   Nursing note and vitals reviewed.              Assessment/Plan:     1. Wound check, abscess      Healing well. Packing removed and irrigated. Follow up as needed       [employer] Interactive Patient Education given: Wound check    Please note that this dictation was created using voice recognition software. I have made every reasonable attempt to correct obvious errors, but I expect that there are errors of grammar and possibly content that I did not discover before finalizing the note.        "

## 2017-07-31 NOTE — PATIENT INSTRUCTIONS
Wound Check  If you have a wound, it may take some time to heal. Eventually, a scar will form. The scar will also fade with time. It is important to take care of your wound while it is healing. This helps to protect your wound from infection.   HOW SHOULD I TAKE CARE OF MY WOUND AT HOME?  · Some wounds are allowed to close on their own or are repaired at a later date. There are many different ways to close and cover a wound, including stitches (sutures), skin glue, and adhesive strips. Follow your health care provider's instructions about:  ¨ Wound care.  ¨ Bandage (dressing) changes and removal.  ¨ Wound closure removal.  · Take medicines only as directed by your health care provider.  · Keep all follow-up visits as directed by your health care provider. This is important.  · Do not take baths, swim, or use a hot tub until your health care provider approves. You may shower as directed by your health care provider.  · Keep your wound clean and dry.  WHAT AFFECTS SCAR FORMATION?  Scars affect each person differently. How your body scars depends on:  · The location and size of your wound.  · Traits that you inherited from your parents (genetic predisposition).  · How you take care of your wound. Irritation and inflammation increase the amount of scar formation.  · Sun exposure. This can darken a scar.  WHEN SHOULD I CALL OR SEE MY HEALTH CARE PROVIDER?  Call or see your health care provider if:  · You have redness, swelling, or pain at your wound site.  · You have fluid, blood, or pus coming from your wound.  · You have muscle aches, chills, or a general ill feeling.  · You notice a bad smell coming from the wound.  · Your wound separates after the sutures, staples, or skin adhesive strips have been removed.  · You have persistent nausea or vomiting.  · You have a fever.  · You are dizzy.  WHEN SHOULD I CALL 911 OR GO TO THE EMERGENCY ROOM?  Call 911 or go to the emergency room if:  · You faint.  · You have difficulty  breathing.     This information is not intended to replace advice given to you by your health care provider. Make sure you discuss any questions you have with your health care provider.     Document Released: 09/23/2005 Document Revised: 01/08/2016 Document Reviewed: 09/29/2015  [employer] Interactive Patient Education ©2016 [employer]

## 2017-08-07 ENCOUNTER — PATIENT OUTREACH (OUTPATIENT)
Dept: HEALTH INFORMATION MANAGEMENT | Facility: OTHER | Age: 67
End: 2017-08-07

## 2017-08-07 ENCOUNTER — TELEPHONE (OUTPATIENT)
Dept: HEALTH INFORMATION MANAGEMENT | Facility: OTHER | Age: 67
End: 2017-08-07

## 2017-08-07 DIAGNOSIS — Z12.11 SCREENING FOR COLON CANCER: ICD-10-CM

## 2017-08-07 NOTE — PROGRESS NOTES
Attempt #:1    WebIZ Checked & Epic Updated: yes  HealthConnect Verified: no  Verify PCP: yes    Communication Preference Obtained: yes     Review Care Team: yes    Annual Wellness Visit Scheduling  1. Scheduling Status:Scheduled     Care Gap Scheduling (Attempt to Schedule EACH Overdue Care Gap!)     Health Maintenance Due   Topic Date Due   • Annual Wellness Visit  Scheduled   • PFT SCREENING-FEV1 AND FEV/FVC RATIO / SPIROMETRY SHOULD BE PERFORMED ANNUALLY  08/28/1969   • IMM DTaP/Tdap/Td Vaccine (1 - Tdap) Scheduled   • COLONOSCOPY  Placed Referral    • IMM ZOSTER VACCINE  Scheduled   • IMM PNEUMOCOCCAL 65+ (ADULT) LOW/MEDIUM RISK SERIES (1 of 2 - PCV13) Scheduled         [employer] Activation: already active  [employer]: no  Virtual Visits: no  Opt In to Text Messages: no

## 2017-08-07 NOTE — TELEPHONE ENCOUNTER
This patient is overdue for health maintenance topics that need orders so he can complete them.     Please review the pended orders in  to sign or make adjustments as appropriate.    Close the encounter when complete.  If declining these orders, please document a reason and route to the Outreach MA pool (p AMB  Outreach).  I will call the patient to cancel the appointment.

## 2017-08-26 NOTE — ASSESSMENT & PLAN NOTE
Chronic condition managed with current medical regimen  Stable per review, has occasional SOB with exertion   Continue with current meds  Followed by Princess ELAM M.D..

## 2017-08-26 NOTE — ASSESSMENT & PLAN NOTE
"Chronic condition managed with current medical regimen  Stable per review, denies pain, numbness or tingling to arms   Continue with surveillance, has been evaluated by a \"spine doctor\"  Followed by Princess ELAM M.D..      "

## 2017-08-26 NOTE — ASSESSMENT & PLAN NOTE
Chronic condition, pt states has not been to a dentist in a long time and may consider making an appt  Denies dental pain   Continue with surveillance

## 2017-08-26 NOTE — ASSESSMENT & PLAN NOTE
Current smoker, not interest in smoking cessation at this time  Pt aware of the adverse affects of smoking  Followed by Princess ELAM M.D..

## 2017-08-26 NOTE — ASSESSMENT & PLAN NOTE
Chronic condition managed with current medical regimen  Stable per review   Continue with current meds  Followed by Princess ELAM M.D..

## 2017-08-28 ENCOUNTER — OFFICE VISIT (OUTPATIENT)
Dept: MEDICAL GROUP | Facility: PHYSICIAN GROUP | Age: 67
End: 2017-08-28
Payer: MEDICARE

## 2017-08-28 VITALS
BODY MASS INDEX: 18.16 KG/M2 | HEART RATE: 87 BPM | SYSTOLIC BLOOD PRESSURE: 130 MMHG | HEIGHT: 73 IN | OXYGEN SATURATION: 97 % | TEMPERATURE: 98.1 F | WEIGHT: 137 LBS | DIASTOLIC BLOOD PRESSURE: 80 MMHG

## 2017-08-28 DIAGNOSIS — K02.9 DENTAL CARIES: ICD-10-CM

## 2017-08-28 DIAGNOSIS — Z72.0 TOBACCO ABUSE: ICD-10-CM

## 2017-08-28 DIAGNOSIS — I67.89 CEREBRAL MICROVASCULAR DISEASE: ICD-10-CM

## 2017-08-28 DIAGNOSIS — Z00.00 MEDICARE ANNUAL WELLNESS VISIT, INITIAL: Primary | ICD-10-CM

## 2017-08-28 DIAGNOSIS — M48.9 CERVICAL SPINE DISEASE: ICD-10-CM

## 2017-08-28 DIAGNOSIS — J44.1 CHRONIC OBSTRUCTIVE PULMONARY DISEASE WITH ACUTE EXACERBATION (HCC): ICD-10-CM

## 2017-08-28 DIAGNOSIS — L02.11 CUTANEOUS ABSCESS OF NECK: ICD-10-CM

## 2017-08-28 PROCEDURE — G0438 PPPS, INITIAL VISIT: HCPCS | Performed by: NURSE PRACTITIONER

## 2017-08-28 ASSESSMENT — PATIENT HEALTH QUESTIONNAIRE - PHQ9: CLINICAL INTERPRETATION OF PHQ2 SCORE: 0

## 2017-08-28 NOTE — PATIENT INSTRUCTIONS
Continue with care through Princess ELAM M.D..  Next Medicare Annual Wellness Visit is due in one year.    Continue care with specialists you are seeing for your chronic problems.    Attached is some preventative information for you to read.          Fall Prevention and Home Safety  Falls cause injuries and can affect all age groups. It is possible to prevent falls.   HOW TO PREVENT FALLS  · Wear shoes with rubber soles that do not have an opening for your toes.   · Keep the inside and outside of your house well lit.   · Use night lights throughout your home.   · Remove clutter from floors.   · Clean up floor spills.   · Remove throw rugs or fasten them to the floor with carpet tape.   · Do not place electrical cords across pathways.   · Put grab bars by your tub, shower, and toilet. Do not use towel bars as grab bars.   · Put handrails on both sides of the stairway. Fix loose handrails.   · Do not climb on stools or stepladders, if possible.   · Do not wax your floors.   · Repair uneven or unsafe sidewalks, walkways, or stairs.   · Keep items you use a lot within reach.   · Be aware of pets.   · Keep emergency numbers next to the telephone.   · Put smoke detectors in your home and near bedrooms.   Ask your doctor what other things you can do to prevent falls.  Document Released: 10/14/2010 Document Revised: 06/18/2013 Document Reviewed: 03/19/2013  ExitCare® Patient Information ©2013 [employer].    Exercise to Stay Healthy      Exercise helps you become and stay healthy.  EXERCISE IDEAS AND TIPS  Choose exercises that:  · You enjoy.   · Fit into your day.   You do not need to exercise really hard to be healthy. You can do exercises at a slow or medium level and stay healthy. You can:  · Stretch before and after working out.   · Try yoga, Pilates, or jose chi.   · Lift weights.   · Walk fast, swim, jog, run, climb stairs, bicycle, dance, or rollerskate.   · Take aerobic classes.   Exercises that burn about  150 calories:  · Running 1 ½ miles in 15 minutes.   · Playing volleyball for 45 to 60 minutes.   · Washing and waxing a car for 45 to 60 minutes.   · Playing touch football for 45 minutes.   · Walking 1 ¾ miles in 35 minutes.   · Pushing a stroller 1 ½ miles in 30 minutes.   · Playing basketball for 30 minutes.   · Raking leaves for 30 minutes.   · Bicycling 5 miles in 30 minutes.   · Walking 2 miles in 30 minutes.   · Dancing for 30 minutes.   · Shoveling snow for 15 minutes.   · Swimming laps for 20 minutes.   · Walking up stairs for 15 minutes.   · Bicycling 4 miles in 15 minutes.   · Gardening for 30 to 45 minutes.   · Jumping rope for 15 minutes.   · Washing windows or floors for 45 to 60 minutes.     One suggestion is to start walking for 10 minutes after each meal.  This will help with digestion and help you to metabolize your meal.       For Weight Loss -   Recommend portion sizes with more fruits/vegetables/high fiber foods.  Stay away from white bread/pastas/white rice/white potatoes.  Increase Fluid intake to 6-8 glasses of water daily.   Eliminate soda's (diet and regular) from your fluid intake.      Document Released: 01/20/2012 Document Revised: 03/11/2013 Document Reviewed: 01/20/2012  ExitCare® Patient Information ©2013 [employer].    Fat and Cholesterol Control Diet  Cholesterol is a wax-like substance. It comes from your liver and is found in certain foods. There is good (HDL) and bad (LDL) cholesterol. Too much cholesterol in your blood can affect your heart. Certain foods can lower or raise your cholesterol. Eat foods that are low in cholesterol.  Saturated and trans fats are bad fats found in foods that will raise your cholesterol. Do not eat foods that are high in saturated and trans fats.  FOODS HIGHER IN SATURATED AND TRANS FATS  · Dairy products, such as whole milk, eggs, cheese, cream, and butter.   · Fatty meats, such as hot dogs, sausage, and salami.   · Fried foods.   · Trans fats  which are found in margarine and pre-made cookies, crackers, and baked goods.   · Tropical oils, such as coconut and palm oils.   Read package labels at the store. Do not buy products that use saturated or trans fats or hydrogenated oils. Find foods labeled:  · Low-fat.   · Low-saturated fat.   · Trans-fat-free.   · Low-cholesterol.   FOODS LOWER IN CHOLESTEROL  ·  Fruit.   · Vegetables.   · Beans, peas, and lentils.   · Fish.   · Lean meat, such as chicken (without skin) or ground turkey.   · Grains, such as barley, rice, couscous, bulgur wheat, and pasta.   · Heart-healthy tub margarine.   PREPARING YOUR FOOD  · Broil, bake, steam, or roast foods. Do not bejarano food.   · Use non-stick cooking sprays.   · Use lemon or herbs to flavor food instead of using butter or stick margarine.   · Use nonfat yogurt, salsa, or low-fat dressings for salads.   Document Released: 06/18/2013 Document Reviewed: 06/18/2013  ExitCare® Patient Information ©2013 [employer]    Recommend annual flu vaccine.  Next due in Fall, 2015.  If you decide not to have the flu vaccine, recommend good handwashing and staying out of crowds during flu season.

## 2017-08-28 NOTE — PROGRESS NOTES
CC:   Medicare Annual Wellness Visit    HPI:  Kobe is a 67 y.o. here for Medicare Annual Wellness Visit    Patient Active Problem List    Diagnosis Date Noted   • Chronic obstructive pulmonary disease with acute exacerbation (CMS-HCC) 03/30/2017   • Dental caries 03/30/2017   • Cervical spine disease 03/30/2017   • Cerebral microvascular disease 03/30/2017   • Tobacco abuse      Current Outpatient Prescriptions   Medication Sig Dispense Refill   • ipratropium-albuterol (DUONEB) 0.5-2.5 (3) MG/3ML nebulizer solution 3 mL by Nebulization route every 6 hours as needed for Shortness of Breath. 120 Vial 0   • lovastatin (MEVACOR) 20 MG Tab Take 1 Tab by mouth every day. 30 Tab 6   • aspirin EC (ECOTRIN) 325 MG Tablet Delayed Response Take 1 Tab by mouth every day. 30 Tab 6     No current facility-administered medications for this visit.       Current supplements: no  Chronic narcotic pain medicines: no  Allergies: Review of patient's allergies indicates no known allergies.  Exercise: yes very active, states does active work  Current social contact/activities: Has support of family and friends      Screening:  Depression Screening    Little interest or pleasure in doing things?  0 - not at all  Feeling down, depressed , or hopeless? 0 - not at all  Patient Health Questionnaire Score: 0     If depressive symptoms identified deferred to follow up visit unless specifically addressed in assessment and plan.    Interpretation of PHQ-9 Total Score   Score Severity   1-4 No Depression   5-9 Mild Depression   10-14 Moderate Depression   15-19 Moderately Severe Depression   20-27 Severe Depression    Screening for Cognitive Impairment    Three Minute Recall (apple, watch, mariela)  2/3    Draw clock face with all 12 numbers set to the hand to show 10 minutes past 11 o'clock  1 5  Cognitive concerns identified deferred for follow up unless specifically addressed in assessment and plan.    Fall Risk Assessment    Has the patient had  two or more falls in the last year or any fall with injury in the last year?  No    Safety Assessment    Throw rugs on floor.  No  Handrails on all stairs.  Yes  Good lighting in all hallways.  Yes  Difficulty hearing.  No  Patient counseled about all safety risks that were identified.    Functional Assessment ADLs    Are there any barriers preventing you from cooking for yourself or meeting nutritional needs?  No.    Are there any barriers preventing you from driving safely or obtaining transportation?  No.    Are there any barriers preventing you from using a telephone or calling for help?  No.    Are there any barriers preventing you from shopping?  No.    Are there any barriers preventing you from taking care of your own finances?  No.    Are there any barriers preventing you from managing your medications?  No.    Are currently engaging any exercise or physical activity?  Yes.       Health Maintenance Summary                Annual Wellness Visit Overdue 1950     PFT SCREENING-FEV1 AND FEV/FVC RATIO / SPIROMETRY SHOULD BE PERFORMED ANNUALLY Overdue 8/28/1969     IMM DTaP/Tdap/Td Vaccine Overdue 8/28/1970     COLONOSCOPY Overdue 8/28/2001     IMM ZOSTER VACCINE Overdue 8/28/2011     IMM INFLUENZA Next Due 9/1/2017     LUNG CANCER SCREENING Next Due 12/1/2017      Done 6/1/2017 CT-LUNG CANCER-SCREENING          Patient Care Team:  Princess ELAM M.D. as PCP - General (Family Medicine)  Ilsa Simmons M.D. as Consulting Physician (Pulmonary Medicine)        Social History   Substance Use Topics   • Smoking status: Current Every Day Smoker     Packs/day: 0.50     Years: 50.00     Types: Cigarettes   • Smokeless tobacco: Never Used      Comment: Started smoking in 1968   • Alcohol use 8.4 - 12.6 oz/week     14 - 21 Cans of beer per week      Comment: Average per weekday 20 Average per weekend 6       Family History   Problem Relation Age of Onset   • Cancer Father      lung   • Other Mother      memory loss  "  • No Known Problems Brother      He  has a past medical history of Cervical spine disease (3/30/2017); Chronic obstructive pulmonary disease with acute exacerbation (CMS-HCC) (3/30/2017); Dental caries (3/30/2017); Pneumonia; Tobacco abuse; and Tonsillitis. He also has no past medical history of Diabetes; Encounter for long-term (current) use of other medications; or Hypertension.   No past surgical history on file.    ROS:    Ostomy or other tubes or amputations: no  Chronic oxygen use no  Last eye exam 40 yrs ago, strongly encouraged to have an eye health exam, pt agrees to consider  : denies incontinence.   Gait: Uses no assistive device   Hearing excellent.    Dentition fair    Exam: Blood pressure 130/80, pulse 87, temperature 36.7 °C (98.1 °F), height 1.854 m (6' 1\"), weight 62.1 kg (137 lb), SpO2 97 %. Body mass index is 18.07 kg/m².  Alert, oriented in no acute distress.  Eye contact is good, speech goal directed, affect calm      Assessment and Plan. The following treatment and monitoring plan is recommended for all problems as listed below:   Tobacco abuse  Current smoker, not interest in smoking cessation at this time  Pt aware of the adverse affects of smoking  Followed by Princess ELAM M.D..     Chronic obstructive pulmonary disease with acute exacerbation (CMS-HCC)  Chronic condition managed with current medical regimen  Stable per review, has occasional SOB with exertion   Continue with current meds  Followed by Princess ELAM M.D..        Dental caries  Chronic condition, pt states has not been to a dentist in a long time and may consider making an appt  Denies dental pain   Continue with surveillance       Cervical spine disease  Chronic condition managed with current medical regimen  Stable per review, denies pain, numbness or tingling to arms   Continue with surveillance, has been evaluated by a \"spine doctor\"  Followed by Princess ELAM M.D..        Cerebral microvascular " disease  Chronic condition managed with current medical regimen  Stable per review   Continue with current meds  Followed by Princess ELAM M.D..        Cutaneous abscess of neck  I&D done per pt at urgent care a month ago  States resolved      Health Care Screening recommendations reviewed with patient today: per Patient Instructions  DPA/Advanced directive: .has an advanced directive -recom a copy be provided    Next office visit for recheck of chronic medical conditions is due with Princess ELAM M.D. 10/19/2017    Declines colonoscopy today, states may discuss with Princess ELAM M.D.

## 2017-10-19 ENCOUNTER — OFFICE VISIT (OUTPATIENT)
Dept: MEDICAL GROUP | Facility: PHYSICIAN GROUP | Age: 67
End: 2017-10-19
Payer: MEDICARE

## 2017-10-19 VITALS
HEART RATE: 90 BPM | DIASTOLIC BLOOD PRESSURE: 70 MMHG | SYSTOLIC BLOOD PRESSURE: 134 MMHG | OXYGEN SATURATION: 96 % | BODY MASS INDEX: 17.35 KG/M2 | HEIGHT: 75 IN | RESPIRATION RATE: 16 BRPM | TEMPERATURE: 97.4 F | WEIGHT: 139.5 LBS

## 2017-10-19 DIAGNOSIS — E78.49 OTHER HYPERLIPIDEMIA: ICD-10-CM

## 2017-10-19 DIAGNOSIS — Z23 NEEDS FLU SHOT: ICD-10-CM

## 2017-10-19 DIAGNOSIS — J44.1 CHRONIC OBSTRUCTIVE PULMONARY DISEASE WITH ACUTE EXACERBATION (HCC): ICD-10-CM

## 2017-10-19 DIAGNOSIS — Z12.11 COLON CANCER SCREENING: ICD-10-CM

## 2017-10-19 DIAGNOSIS — M48.9 CERVICAL SPINE DISEASE: ICD-10-CM

## 2017-10-19 DIAGNOSIS — Z72.0 TOBACCO ABUSE: ICD-10-CM

## 2017-10-19 PROCEDURE — G0008 ADMIN INFLUENZA VIRUS VAC: HCPCS | Performed by: INTERNAL MEDICINE

## 2017-10-19 PROCEDURE — 90662 IIV NO PRSV INCREASED AG IM: CPT | Performed by: INTERNAL MEDICINE

## 2017-10-19 PROCEDURE — 99214 OFFICE O/P EST MOD 30 MIN: CPT | Mod: 25 | Performed by: INTERNAL MEDICINE

## 2017-10-19 NOTE — ASSESSMENT & PLAN NOTE
Patient denies problems with dyspnea chest pain palpitations cough fevers or chills. He is utilizing his utilizing duo nebs mostly on a when necessary only.

## 2017-10-19 NOTE — PROGRESS NOTES
Chief Complaint   Patient presents with   • Labs Only     FV from 3 months       HISTORY OF PRESENT ILLNESS: Patient is a 67 y.o. male established patient who presents today to discuss the medical issues below.    Tobacco abuse  Patient has started smoking again, states under too much stress.      Cervical spine disease  Patient states neck is doing well, no current pain numbness tingling or weakness.     Chronic obstructive pulmonary disease with acute exacerbation (CMS-HCC)  Patient denies problems with dyspnea chest pain palpitations cough fevers or chills. He is utilizing his utilizing duo nebs mostly on a when necessary only.    Other hyperlipidemia  Patient continues on the lovastatin he forgot to get his lab work done.      Patient Active Problem List    Diagnosis Date Noted   • Other hyperlipidemia 10/19/2017   • Chronic obstructive pulmonary disease with acute exacerbation (CMS-[facility]) 03/30/2017   • Dental caries 03/30/2017   • Cervical spine disease 03/30/2017   • Cerebral microvascular disease 03/30/2017   • Tobacco abuse        Allergies:Review of patient's allergies indicates no known allergies.    Current Outpatient Prescriptions   Medication Sig Dispense Refill   • aspirin EC (ECOTRIN) 325 MG Tablet Delayed Response Take 1 Tab by mouth every day. 30 Tab 6   • ipratropium-albuterol (DUONEB) 0.5-2.5 (3) MG/3ML nebulizer solution 3 mL by Nebulization route every 6 hours as needed for Shortness of Breath. 120 Vial 0   • lovastatin (MEVACOR) 20 MG Tab Take 1 Tab by mouth every day. 30 Tab 6     No current facility-administered medications for this visit.          Past Medical History:   Diagnosis Date   • Chronic obstructive pulmonary disease with acute exacerbation (CMS-HCC) 3/30/2017   • Dental caries 3/30/2017   • Cervical spine disease 3/30/2017   • Pneumonia    • Tobacco abuse    • Tonsillitis        Social History   Substance Use Topics   • Smoking status: Current Every Day Smoker     Packs/day: 0.50  "    Years: 50.00     Types: Cigarettes   • Smokeless tobacco: Never Used      Comment: Started smoking in    • Alcohol use 8.4 - 12.6 oz/week     14 - 21 Cans of beer per week      Comment: Average per weekday 20 Average per weekend 6       Family Status   Relation Status   • Father    • Mother Alive   • Brother Alive     Family History   Problem Relation Age of Onset   • Cancer Father      lung   • Other Mother      memory loss   • No Known Problems Brother        ROS:    Respiratory: Negative for cough, sputum production, shortness of breath or wheezing.    Cardiovascular: Negative for chest pain, palpitations, orthopnea, dyspnea with exertion or edema.   Gastrointestinal: Negative for GI upset, nausea, vomiting, abdominal pain, constipation or diarrhea.   Genitourinary: Negative for dysuria, urgency, hesitancy or frequency.       Exam:    Blood pressure 134/70, pulse 90, temperature 36.3 °C (97.4 °F), resp. rate 16, height 1.892 m (6' 2.5\"), weight 63.3 kg (139 lb 8 oz), SpO2 96 %.  General:Slender but well-nourished, well developed male in NAD.  Pulmonary:Vesicular motion mildly prolonged expiration or rhonchi rales wheezes Normal effort. No rales, rhonchi, or wheezing.  Cardiovascular: Regular rate and rhythm without murmur.   Abdomen: Normal bowel sounds soft and nontender no palpable liver spleen bladder mass.  Extremities: No LE edema noted.  Neuro: Grossly nonfocal.  Psych: Alert and oriented to person, place, and time. Appropriate mood and conversation.        This dictation was created using voice recognition software. I have made reasonable attempts to correct errors, however, errors of grammar and content may exist.          Assessment/Plan:    1. Tobacco abuse  Long discussion held, behavior modification options support    2. Needs flu shot  Flu vaccination today.    3. Cervical spine disease  Clinically stable with no specific intervention. He is continuing with Dr. Marcus on an annual " basis.    4. Chronic obstructive pulmonary disease with acute exacerbation (CMS-[facility])  Reviewed CT findings and recommendations for CT scan in December. He states he has an appointment with Dr. Torres and is to have that done. I've encouraged her to get his lab work done prior to that imaging. An plications of cigarette smoking and the severe emphysema with noncalcified nodules discussed with patient.    5. Other hyperlipidemia  Continue on statin overdue for labs reviewed with patient.    6. Healthcare maintenance  Reviewed recommendations for colonoscopy. Patient states he doesn't have time for this. Discussed implications recommend he schedule a colonoscopy for screening.    Patient was seen for  25 minutes face to face of which more than 50% of the time was spent in counseling and coordination of care regarding the above problems.

## 2018-01-09 ENCOUNTER — APPOINTMENT (OUTPATIENT)
Dept: PULMONOLOGY | Facility: HOSPICE | Age: 68
End: 2018-01-09
Payer: MEDICARE

## 2018-01-13 ENCOUNTER — HOSPITAL ENCOUNTER (OUTPATIENT)
Dept: RADIOLOGY | Facility: MEDICAL CENTER | Age: 68
End: 2018-01-13
Attending: INTERNAL MEDICINE
Payer: MEDICARE

## 2018-01-13 DIAGNOSIS — R91.8 LUNG NODULES: ICD-10-CM

## 2018-01-13 PROCEDURE — 71250 CT THORAX DX C-: CPT

## 2018-02-14 ENCOUNTER — HOSPITAL ENCOUNTER (OUTPATIENT)
Dept: LAB | Facility: MEDICAL CENTER | Age: 68
End: 2018-02-14
Attending: INTERNAL MEDICINE
Payer: MEDICARE

## 2018-02-14 DIAGNOSIS — E78.5 HYPERLIPIDEMIA, UNSPECIFIED HYPERLIPIDEMIA TYPE: ICD-10-CM

## 2018-02-14 DIAGNOSIS — Z00.00 HEALTH CARE MAINTENANCE: ICD-10-CM

## 2018-02-14 LAB
25(OH)D3 SERPL-MCNC: 11 NG/ML (ref 30–100)
ALBUMIN SERPL BCP-MCNC: 4.4 G/DL (ref 3.2–4.9)
ALBUMIN/GLOB SERPL: 1.5 G/DL
ALP SERPL-CCNC: 73 U/L (ref 30–99)
ALT SERPL-CCNC: 16 U/L (ref 2–50)
ANION GAP SERPL CALC-SCNC: 4 MMOL/L (ref 0–11.9)
AST SERPL-CCNC: 27 U/L (ref 12–45)
BASOPHILS # BLD AUTO: 0.7 % (ref 0–1.8)
BASOPHILS # BLD: 0.04 K/UL (ref 0–0.12)
BILIRUB SERPL-MCNC: 0.5 MG/DL (ref 0.1–1.5)
BUN SERPL-MCNC: 8 MG/DL (ref 8–22)
CALCIUM SERPL-MCNC: 9.7 MG/DL (ref 8.5–10.5)
CHLORIDE SERPL-SCNC: 100 MMOL/L (ref 96–112)
CHOLEST SERPL-MCNC: 128 MG/DL (ref 100–199)
CO2 SERPL-SCNC: 32 MMOL/L (ref 20–33)
CREAT SERPL-MCNC: 0.73 MG/DL (ref 0.5–1.4)
EOSINOPHIL # BLD AUTO: 0.35 K/UL (ref 0–0.51)
EOSINOPHIL NFR BLD: 5.9 % (ref 0–6.9)
ERYTHROCYTE [DISTWIDTH] IN BLOOD BY AUTOMATED COUNT: 43.8 FL (ref 35.9–50)
GLOBULIN SER CALC-MCNC: 2.9 G/DL (ref 1.9–3.5)
GLUCOSE SERPL-MCNC: 77 MG/DL (ref 65–99)
HCT VFR BLD AUTO: 50 % (ref 42–52)
HDLC SERPL-MCNC: 36 MG/DL
HGB BLD-MCNC: 17.1 G/DL (ref 14–18)
IMM GRANULOCYTES # BLD AUTO: 0.03 K/UL (ref 0–0.11)
IMM GRANULOCYTES NFR BLD AUTO: 0.5 % (ref 0–0.9)
LDLC SERPL CALC-MCNC: 82 MG/DL
LYMPHOCYTES # BLD AUTO: 1.76 K/UL (ref 1–4.8)
LYMPHOCYTES NFR BLD: 29.4 % (ref 22–41)
MCH RBC QN AUTO: 33.6 PG (ref 27–33)
MCHC RBC AUTO-ENTMCNC: 34.2 G/DL (ref 33.7–35.3)
MCV RBC AUTO: 98.2 FL (ref 81.4–97.8)
MONOCYTES # BLD AUTO: 0.69 K/UL (ref 0–0.85)
MONOCYTES NFR BLD AUTO: 11.5 % (ref 0–13.4)
NEUTROPHILS # BLD AUTO: 3.11 K/UL (ref 1.82–7.42)
NEUTROPHILS NFR BLD: 52 % (ref 44–72)
NRBC # BLD AUTO: 0 K/UL
NRBC BLD-RTO: 0 /100 WBC
PLATELET # BLD AUTO: 315 K/UL (ref 164–446)
PMV BLD AUTO: 11.8 FL (ref 9–12.9)
POTASSIUM SERPL-SCNC: 5.4 MMOL/L (ref 3.6–5.5)
PROT SERPL-MCNC: 7.3 G/DL (ref 6–8.2)
PSA SERPL-MCNC: 0.54 NG/ML (ref 0–4)
RBC # BLD AUTO: 5.09 M/UL (ref 4.7–6.1)
SODIUM SERPL-SCNC: 136 MMOL/L (ref 135–145)
TRIGL SERPL-MCNC: 51 MG/DL (ref 0–149)
WBC # BLD AUTO: 6 K/UL (ref 4.8–10.8)

## 2018-02-14 PROCEDURE — 84153 ASSAY OF PSA TOTAL: CPT | Mod: GA

## 2018-02-14 PROCEDURE — 85025 COMPLETE CBC W/AUTO DIFF WBC: CPT

## 2018-02-14 PROCEDURE — 82306 VITAMIN D 25 HYDROXY: CPT | Mod: GA

## 2018-02-14 PROCEDURE — 80053 COMPREHEN METABOLIC PANEL: CPT

## 2018-02-14 PROCEDURE — 36415 COLL VENOUS BLD VENIPUNCTURE: CPT

## 2018-02-14 PROCEDURE — 80061 LIPID PANEL: CPT

## 2018-02-21 ENCOUNTER — OFFICE VISIT (OUTPATIENT)
Dept: MEDICAL GROUP | Facility: PHYSICIAN GROUP | Age: 68
End: 2018-02-21
Payer: MEDICARE

## 2018-02-21 VITALS
WEIGHT: 139 LBS | HEART RATE: 89 BPM | DIASTOLIC BLOOD PRESSURE: 76 MMHG | SYSTOLIC BLOOD PRESSURE: 118 MMHG | RESPIRATION RATE: 18 BRPM | HEIGHT: 73 IN | TEMPERATURE: 99.6 F | BODY MASS INDEX: 18.42 KG/M2 | OXYGEN SATURATION: 97 %

## 2018-02-21 DIAGNOSIS — Z72.0 TOBACCO ABUSE: ICD-10-CM

## 2018-02-21 DIAGNOSIS — J44.1 CHRONIC OBSTRUCTIVE PULMONARY DISEASE WITH ACUTE EXACERBATION (HCC): ICD-10-CM

## 2018-02-21 DIAGNOSIS — E78.49 OTHER HYPERLIPIDEMIA: ICD-10-CM

## 2018-02-21 DIAGNOSIS — E55.9 VITAMIN D DEFICIENCY: ICD-10-CM

## 2018-02-21 DIAGNOSIS — Z23 NEED FOR TDAP VACCINATION: ICD-10-CM

## 2018-02-21 DIAGNOSIS — Z23 NEED FOR VACCINATION WITH 13-POLYVALENT PNEUMOCOCCAL CONJUGATE VACCINE: ICD-10-CM

## 2018-02-21 PROCEDURE — 90715 TDAP VACCINE 7 YRS/> IM: CPT | Performed by: INTERNAL MEDICINE

## 2018-02-21 PROCEDURE — 90670 PCV13 VACCINE IM: CPT | Performed by: INTERNAL MEDICINE

## 2018-02-21 PROCEDURE — 90472 IMMUNIZATION ADMIN EACH ADD: CPT | Performed by: INTERNAL MEDICINE

## 2018-02-21 PROCEDURE — 99214 OFFICE O/P EST MOD 30 MIN: CPT | Mod: 25 | Performed by: INTERNAL MEDICINE

## 2018-02-21 PROCEDURE — G0009 ADMIN PNEUMOCOCCAL VACCINE: HCPCS | Performed by: INTERNAL MEDICINE

## 2018-02-21 ASSESSMENT — PAIN SCALES - GENERAL: PAINLEVEL: NO PAIN

## 2018-02-21 NOTE — PROGRESS NOTES
Chief Complaint   Patient presents with   • COPD     lab results    • Immunizations     PCV 13 TDAP        HISTORY OF PRESENT ILLNESS: Patient is a 67 y.o. male established patient who presents today to discuss the medical issues below.    Tobacco abuse  Patient is smoking.      Chronic obstructive pulmonary disease with acute exacerbation (CMS-HCC)  Patient continues to smoke, 1/2 ppd, using the nebulizer variably currently about 1 x a week.  Patient with dry non productive cough       Other hyperlipidemia  Patient reports not taking take the lovastatin.        Patient Active Problem List    Diagnosis Date Noted   • Other hyperlipidemia 10/19/2017   • Colon cancer screening 10/19/2017   • Chronic obstructive pulmonary disease with acute exacerbation (CMS-HCC) 03/30/2017   • Dental caries 03/30/2017   • Cervical spine disease 03/30/2017   • Cerebral microvascular disease 03/30/2017   • Tobacco abuse        Allergies:Patient has no known allergies.    Current Outpatient Prescriptions   Medication Sig Dispense Refill   • aspirin EC (ECOTRIN) 325 MG Tablet Delayed Response Take 1 Tab by mouth every day. 30 Tab 6   • ipratropium-albuterol (DUONEB) 0.5-2.5 (3) MG/3ML nebulizer solution 3 mL by Nebulization route every 6 hours as needed for Shortness of Breath. 120 Vial 0   • lovastatin (MEVACOR) 20 MG Tab Take 1 Tab by mouth every day. 30 Tab 6     No current facility-administered medications for this visit.          Past Medical History:   Diagnosis Date   • Cervical spine disease 3/30/2017   • Chronic obstructive pulmonary disease with acute exacerbation (CMS-HCC) 3/30/2017   • Dental caries 3/30/2017   • Pneumonia    • Tobacco abuse    • Tonsillitis        Social History   Substance Use Topics   • Smoking status: Current Every Day Smoker     Packs/day: 0.50     Years: 50.00     Types: Cigarettes   • Smokeless tobacco: Never Used      Comment: Started smoking in 1968   • Alcohol use 8.4 - 12.6 oz/week     14 - 21  "Cans of beer per week      Comment: Average per weekday 20 Average per weekend 6       Family Status   Relation Status   • Father    • Mother Alive   • Brother Alive     Family History   Problem Relation Age of Onset   • Cancer Father      lung   • Other Mother      memory loss   • No Known Problems Brother        ROS:    Respiratory: Negative for cough, sputum production, shortness of breath or wheezing.    Cardiovascular: Negative for chest pain, palpitations, orthopnea, dyspnea with exertion or edema.   Gastrointestinal: Negative for GI upset, nausea, vomiting, abdominal pain, constipation or diarrhea.   Genitourinary: Negative for dysuria, urgency, hesitancy or frequency.       Exam:    Blood pressure 118/76, pulse 89, temperature 37.6 °C (99.6 °F), resp. rate 18, height 1.854 m (6' 1\"), weight 63 kg (139 lb), SpO2 97 %.  General:  Well nourished, well developed male in NAD.  HENT: Normocephalic, bilateral TMs are intact, nasal and oral mucosa with no lesions,   Neck: Supple without bruit. Thyroid is not enlarged.  Pulmonary: Coarse rhonchi right lung base clear after deep cough otherwise minimally prolonged expiration and vesicular breath sounds  Cardiovascular: Regular rate and rhythm without murmur.   Abdomen: Normal bowel sounds soft and nontender no palpable liver spleen bladder mass.  Extremities: No LE edema noted.  Neuro: Grossly nonfocal.  Psych: Alert and oriented to person, place, and time. Appropriate mood and conversation.    LABS: Results reviewed and discussed with the patient, questions answered.    This dictation was created using voice recognition software. I have made reasonable attempts to correct errors, however, errors of grammar and content may exist.          Assessment/Plan:    1. Tobacco abuse  Patient continues to struggle. Discussed options to replace cigarettes and at minimum decrease use. Support    2. Chronic obstructive pulmonary disease with acute exacerbation " (CMS-HCC)  Significant pulmonary findings. Discussed maximizing bronchodilator therapy. Repeat CT scan done in January indicated ongoing emphysema stable right lower lobe and left major fissure nodules. Resolution of the 7 mm nodule in the left lower lobe. High risk for ongoing smoking. Consideration for repeat CT scan in 12 months    3. Other hyperlipidemia  Patient not taking his statin his LDL is excellent and he opts for discontinuation    4. Need for vaccination with 13-polyvalent pneumococcal conjugate vaccine  - PNEUMOCOCCAL CONJUGATE VACCINE 13-VALENT    5. Need for Tdap vaccination  - TDAP VACCINE =>6YO IM    6. Vitamin D deficiency  Substantial vitamin D deficiency with a level of 11. Patient declines prescription vitamin D replacement. Recommend minimum of 5000 international units a day and ongoing monitoring.  - VITAMIN D,25 HYDROXY; Future       Patient was seen for  25 minutes face to face of which more than 50% of the time was spent in counseling and coordination of care regarding the above problems.

## 2018-02-21 NOTE — ASSESSMENT & PLAN NOTE
Patient continues to smoke, 1/2 ppd, using the nebulizer variably currently about 1 x a week.  Patient with dry non productive cough

## 2018-05-30 ENCOUNTER — OFFICE VISIT (OUTPATIENT)
Dept: PHYSICAL MEDICINE AND REHAB | Facility: MEDICAL CENTER | Age: 68
End: 2018-05-30
Payer: MEDICARE

## 2018-05-30 VITALS
DIASTOLIC BLOOD PRESSURE: 80 MMHG | HEIGHT: 73 IN | SYSTOLIC BLOOD PRESSURE: 122 MMHG | HEART RATE: 104 BPM | OXYGEN SATURATION: 96 % | BODY MASS INDEX: 17.89 KG/M2 | TEMPERATURE: 97.7 F | WEIGHT: 135 LBS

## 2018-05-30 DIAGNOSIS — M48.02 CERVICAL SPINAL STENOSIS: ICD-10-CM

## 2018-05-30 DIAGNOSIS — M47.812 SPONDYLOSIS OF CERVICAL REGION WITHOUT MYELOPATHY OR RADICULOPATHY: ICD-10-CM

## 2018-05-30 DIAGNOSIS — M50.30 DDD (DEGENERATIVE DISC DISEASE), CERVICAL: ICD-10-CM

## 2018-05-30 DIAGNOSIS — M54.2 NECK PAIN: ICD-10-CM

## 2018-05-30 PROCEDURE — 99213 OFFICE O/P EST LOW 20 MIN: CPT | Performed by: PHYSICAL MEDICINE & REHABILITATION

## 2018-05-30 NOTE — PROGRESS NOTES
Subjective:      Kobe Morris presents with Follow-Up        Subjective: Mr. Patterson returns to the office today for follow-up evaluation of neck pain.     I last saw the patient in 5/2017, reviewed intercurrent medical and social issues.    Regarding today's visit:    The patient notes intermittent neck pain, controlled, he notes intermittent upper limb neuropathic symptoms, controlled, without overt radicular component. No carpal tunnel symptoms noted.    He notes intermittent shoulder area pain, controlled.    The patient notes intermittent joint/musculoskeletal pain, primarily activity associated, controlled.    The patient notes only intermittent difficulty with balance, without significant change. No cranial nerve symptomatology noted. No change with strength noted. Overall symptoms are controlled with the treatment to date.      MEDICAL RECORDS REVIEW/DATA REVIEW: Reviewed in epic.    I reviewed medications.     I reviewed diagnostic studies:     I reviewed radiographs.     Reviewed MRI cervical spine 1/2017 showed multilevel degenerative changes, disc protrusions, central stenosis greatest at C4-5, foraminal stenosis, cervical spondylosis, no intrinsic cord changes noted.     Reviewed cervical spine x-rays 6/2017, I reviewed images and report, see below, showed multilevel degenerative changes, listhesis, spondylosis, mild instability with flexion/extension.    Reviewed MRI brain 1/2017.     Reviewed CT chest 1/2018. Reviewed chest x-ray 1/2017.    I reviewed lab studies. Reviewed labs 2/2018, including CMP.    I reviewed medical issues. Followed by primary care provider, records reviewed.    I reviewed family history: No neuromuscular disorders noted.    I reviewed social issues. Karen, former master diver      6/1/2017 11:05 AM    HISTORY/REASON FOR EXAM: Atraumatic Pain  Left arm tingling over the last month    TECHNIQUE/EXAM DESCRIPTION AND NUMBER OF VIEWS:  Cervical spine series, 4 views.    COMPARISON:  None.    FINDINGS:  The alignment of the cervical spine is evaluated through C7 on T1.    There is mild degenerative retrolisthesis at C4/5/6/7. There is moderate disc height loss. Minimal C3/4 disc height loss and retrolisthesis is also seen    Upon flexion C3/4/5 retrolisthesis resolves    Upon extension there is no change    No acute displaced fracture is seen.    The vertebral body heights are maintained.    There is relatively mild facet arthropathy and uncovertebral hypertrophy    There is no prevertebral soft tissue swelling.   Impression       Moderate mid cervical spondylosis with several levels of mild degenerative retrolisthesis that improves with flexion       PAST MEDICAL HISTORY:   Past Medical History:   Diagnosis Date   • Cervical spine disease 3/30/2017   • Chronic obstructive pulmonary disease with acute exacerbation (HCC) 3/30/2017   • Dental caries 3/30/2017   • Pneumonia    • Tobacco abuse    • Tonsillitis        PAST SURGICAL HISTORY:  History reviewed. No pertinent surgical history.    ALLERGIES:  Review of patient's allergies indicates no known allergies.    MEDICATIONS:    Outpatient Encounter Prescriptions as of 5/30/2018   Medication Sig Dispense Refill   • aspirin EC (ECOTRIN) 325 MG Tablet Delayed Response Take 1 Tab by mouth every day. 30 Tab 6   • ipratropium-albuterol (DUONEB) 0.5-2.5 (3) MG/3ML nebulizer solution 3 mL by Nebulization route every 6 hours as needed for Shortness of Breath. 120 Vial 0   • lovastatin (MEVACOR) 20 MG Tab Take 1 Tab by mouth every day. 30 Tab 6     No facility-administered encounter medications on file as of 5/30/2018.        SOCIAL HISTORY:    Social History     Social History   • Marital status: Single     Spouse name: N/A   • Number of children: N/A   • Years of education: N/A     Social History Main Topics   • Smoking status: Current Every Day Smoker     Packs/day: 0.50     Years: 50.00     Types: Cigarettes   • Smokeless tobacco: Never Used       "Comment: Started smoking in 1968   • Alcohol use 8.4 - 12.6 oz/week     14 - 21 Cans of beer per week      Comment: Average per weekday 20 Average per weekend 6   • Drug use: No   • Sexual activity: No      Comment:  5 years ago     Other Topics Concern   •  Service No   • Blood Transfusions No   • Caffeine Concern No   • Occupational Exposure No   • Hobby Hazards No   • Sleep Concern No   • Stress Concern No   • Weight Concern Yes     lost weight    • Special Diet No   • Back Care No   • Exercise Yes   • Bike Helmet No     does not ride bike   • Seat Belt Yes   • Self-Exams No     Social History Narrative   • No narrative on file       Review of Systems  reviewed, no changes noted  Constitutional: Negative for fever and chills.   HENT: Negative for hearing loss.    Eyes: Negative for double vision and photophobia.   Respiratory: Negative for hemoptysis and sputum production.    Cardiovascular: Negative for palpitations and orthopnea.   Gastrointestinal: Negative for nausea and vomiting.   Genitourinary: Negative for urgency and frequency.   Musculoskeletal: Positive for myalgias and neck pain.   Skin: Negative.    Neurological: Positive for tingling and sensory change. Negative for headaches.   Endo/Heme/Allergies: Negative.    All other systems reviewed and are negative.        Objective:     /80   Pulse (!) 104   Temp 36.5 °C (97.7 °F)   Ht 1.854 m (6' 1\")   Wt 61.2 kg (135 lb)   SpO2 96%   BMI 17.81 kg/m²      Physical Exam  Constitutional: oriented to person, place, and time, appears well-developed and well-nourished.   HEENT: Normocephalic atraumatic, neck supple, no JVD noted, no masses noted, no meningeal signs noted  Lymphadenopathy: no cervical, supraclavicular, or occipital lymphadenopathy noted  Cardiovascular: Intact distal pulses, including at wrists, no upper limb swelling noted  Pulmonary: No tachypnea noted, no accessory muscle use noted, no dyspnea noted  Abdominal: Soft, " nontender, exhibits no distension, no peritoneal signs, no HSM  Musculoskeletal:   Right shoulder: exhibits only mild tenderness. Minimal pain with range of motion testing  Left shoulder: exhibits only mild tenderness. Minimal pain with range of motion testing  Cervical back: exhibits only mild decreased range of motion, only mild tenderness and only mild pain. Spurling's testing produces only mild axial pain  Wrist/hand: Only mild pain with range of motion testing, negative tinel's at wrist, negative tinel's at elbows  Neurological: oriented to person, place, and time. Cranial nerves grossly intact, normal strength. Sensation intact distally. Reflexes 1+ in upper limbs, Gait steady, reciprocal. Able to heel/toe walk, No upper motor neuron signs evident, normal Romberg testing, although mildly unsteady, mildly unsteady with tandem gait testing but able to complete  Skin: Skin is intact. no rashes or lesions noted  Psychiatric: normal mood and affect. speech is normal and behavior is normal. Judgment and thought content normal. Cognition and memory are normal.         Assessment/Plan:       ASSESSMENT:    1. Neck pain, myofascial pain, cervical spinal stenosis, disc protrusions, degenerative disc disease, cervical spondylosis, listhesis with mild instability, relatively symptomatically controlled    - Reviewed/offered spine surgery consultation, patient desires further trial of nonsurgical care  - Reviewed spinal precautions    2. Shoulder area pain, sprain strain, symptomatically controlled      DISCUSSION/PLAN:    - I discussed management options. I reviewed symptomatic care    - I reviewed home exercise program, with medical/spinal precautions.     - The patient can consider complementary trials with acupuncture or TENS unit    - I reviewed medication monitoring.  I reviewed further symptomatic medications. I did not prescribe any medications today    - I reviewed additional diagnostic options, including updated  MRI cervical spine, electrodiagnostic testing, vascular studies, and lab screen    - I reviewed additional therapeutic options, including injection/interventional therapy and additional consultative input    - I reviewed psychosocial interventions    - I encourage the patient to stop or decrease cigarette use    - Return in one year  or sooner if needed      Please note that this dictation was created using voice recognition software. I have made every reasonable attempt to correct obvious errors but there may be errors of grammar and content that I may have overlooked prior to finalization of this note.

## 2018-06-20 ENCOUNTER — APPOINTMENT (OUTPATIENT)
Dept: RADIOLOGY | Facility: IMAGING CENTER | Age: 68
End: 2018-06-20
Attending: NURSE PRACTITIONER
Payer: MEDICARE

## 2018-06-20 ENCOUNTER — OFFICE VISIT (OUTPATIENT)
Dept: URGENT CARE | Facility: PHYSICIAN GROUP | Age: 68
End: 2018-06-20
Payer: MEDICARE

## 2018-06-20 VITALS
SYSTOLIC BLOOD PRESSURE: 120 MMHG | TEMPERATURE: 98.5 F | WEIGHT: 134 LBS | DIASTOLIC BLOOD PRESSURE: 80 MMHG | HEART RATE: 104 BPM | BODY MASS INDEX: 17.76 KG/M2 | OXYGEN SATURATION: 94 % | HEIGHT: 73 IN | RESPIRATION RATE: 18 BRPM

## 2018-06-20 DIAGNOSIS — R05.9 COUGH: ICD-10-CM

## 2018-06-20 DIAGNOSIS — J18.9 PNEUMONIA DUE TO INFECTIOUS ORGANISM, UNSPECIFIED LATERALITY, UNSPECIFIED PART OF LUNG: ICD-10-CM

## 2018-06-20 PROCEDURE — 94640 AIRWAY INHALATION TREATMENT: CPT | Performed by: NURSE PRACTITIONER

## 2018-06-20 PROCEDURE — 99214 OFFICE O/P EST MOD 30 MIN: CPT | Mod: 25 | Performed by: NURSE PRACTITIONER

## 2018-06-20 PROCEDURE — 94010 BREATHING CAPACITY TEST: CPT | Performed by: NURSE PRACTITIONER

## 2018-06-20 PROCEDURE — 71046 X-RAY EXAM CHEST 2 VIEWS: CPT | Mod: TC,FY | Performed by: NURSE PRACTITIONER

## 2018-06-20 RX ORDER — AZITHROMYCIN 250 MG/1
TABLET, FILM COATED ORAL
Qty: 6 TAB | Refills: 0 | Status: SHIPPED | OUTPATIENT
Start: 2018-06-20 | End: 2018-07-10

## 2018-06-20 RX ORDER — METHYLPREDNISOLONE SODIUM SUCCINATE 125 MG/2ML
62.5 INJECTION, POWDER, LYOPHILIZED, FOR SOLUTION INTRAMUSCULAR; INTRAVENOUS ONCE
Status: COMPLETED | OUTPATIENT
Start: 2018-06-20 | End: 2018-06-20

## 2018-06-20 RX ORDER — IPRATROPIUM BROMIDE AND ALBUTEROL SULFATE 2.5; .5 MG/3ML; MG/3ML
3 SOLUTION RESPIRATORY (INHALATION) ONCE
Status: COMPLETED | OUTPATIENT
Start: 2018-06-20 | End: 2018-06-20

## 2018-06-20 RX ORDER — PREDNISONE 20 MG/1
TABLET ORAL
Qty: 7 TAB | Refills: 0 | Status: SHIPPED | OUTPATIENT
Start: 2018-06-20 | End: 2018-07-10

## 2018-06-20 RX ORDER — ALBUTEROL SULFATE 90 UG/1
2 AEROSOL, METERED RESPIRATORY (INHALATION) EVERY 6 HOURS PRN
Qty: 8.5 G | Refills: 0 | Status: SHIPPED | OUTPATIENT
Start: 2018-06-20 | End: 2019-05-22

## 2018-06-20 RX ADMIN — IPRATROPIUM BROMIDE AND ALBUTEROL SULFATE 3 ML: 2.5; .5 SOLUTION RESPIRATORY (INHALATION) at 10:33

## 2018-06-20 RX ADMIN — METHYLPREDNISOLONE SODIUM SUCCINATE 62.5 MG: 125 INJECTION, POWDER, LYOPHILIZED, FOR SOLUTION INTRAMUSCULAR; INTRAVENOUS at 11:05

## 2018-06-20 ASSESSMENT — ENCOUNTER SYMPTOMS
GASTROINTESTINAL NEGATIVE: 1
SPUTUM PRODUCTION: 1
DIZZINESS: 0
COUGH: 1
SORE THROAT: 0
HEADACHES: 0
ABDOMINAL PAIN: 0
CARDIOVASCULAR NEGATIVE: 1
SHORTNESS OF BREATH: 1
NEUROLOGICAL NEGATIVE: 1
NAUSEA: 0
FEVER: 0
EYES NEGATIVE: 1
MUSCULOSKELETAL NEGATIVE: 1
VOMITING: 0
DIARRHEA: 0
CONSTITUTIONAL NEGATIVE: 1
MYALGIAS: 0
WHEEZING: 0

## 2018-06-20 NOTE — PROGRESS NOTES
"Subjective:      Kobe Morris is a 67 y.o. 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   Patient complaints of cough with green-yellow sputum  Patient complains of chest congestion  Patient does complain of chest pain with coughing  Patient does have intermittent shortness of breath when not using his nebulizer  Patient states he does not have an albuterol inhaler    Patient is denying fever nausea vomiting diarrhea  Patient is denying sore throat earaches  Patient denying headache or dizziness  Patient is denying fatigue or myalgias or joint pain    Patient states he is down to 2 cigarettes a day and hopefully by next week he will be completely done smoking  Patient does have a follow-up appointment with his pulmonologist scheduled for July 2        Review of Systems   Constitutional: Negative.  Negative for fever and malaise/fatigue.   HENT: Negative.  Negative for ear pain and sore throat.    Eyes: Negative.    Respiratory: Positive for cough, sputum production and shortness of breath. Negative for wheezing.    Cardiovascular: Negative.  Negative for chest pain.   Gastrointestinal: Negative.  Negative for abdominal pain, diarrhea, nausea and vomiting.   Genitourinary: Negative.    Musculoskeletal: Negative.  Negative for myalgias.   Skin: Negative.  Negative for rash.   Neurological: Negative.  Negative for dizziness and headaches.   Endo/Heme/Allergies: Negative.           Objective:     /80   Pulse (!) 104   Temp 36.9 °C (98.5 °F)   Resp 18   Ht 1.854 m (6' 1\")   Wt 60.8 kg (134 lb)   SpO2 94%   BMI 17.68 kg/m²      Physical Exam   Constitutional: He is oriented " to person, place, and time. He appears well-developed and well-nourished.   HENT:   Head: Normocephalic and atraumatic.   Right Ear: External ear normal.   Left Ear: External ear normal.   Nose: Nose normal.   Mouth/Throat: Oropharynx is clear and moist.   Eyes: Conjunctivae are normal.   Neck: Normal range of motion. Neck supple.   Cardiovascular: Normal rate, regular rhythm and normal heart sounds.    Pulmonary/Chest: Effort normal. No tachypnea. No respiratory distress.   94% on ra  No amu  No pursed lip breathing  Lungs tight thoughout     Rt tx x 1 with du neb--->pt states he feels better but spo2 now 91%   Scattered wheezing  No amu  resp non labored  Cough did improve    Patient was reassessed after x-ray and about 15 minutes after breathing treatment  Oxygenation increased to 95-96%  Basis tight less wheezing  And patient states he feels so much better  No accessory muscle use  Respirations even nonlabored  Patient is speaking full clear sentences  Skin is warm and dry       Abdominal: Soft. Bowel sounds are normal.   Musculoskeletal: Normal range of motion.   Neurological: He is alert and oriented to person, place, and time.   Skin: Skin is warm and dry. Capillary refill takes less than 2 seconds.   Psychiatric: He has a normal mood and affect. His behavior is normal. Judgment and thought content normal.             non toxic  Assessment/Plan:     1. Cough  ipratropium-albuterol (DUONEB) nebulizer solution    DX-CHEST-2 VIEWS    methylPREDNISolone sod succ (SOLU-MEDROL) 125 MG injection 62.5 mg    azithromycin (ZITHROMAX) 250 MG Tab    predniSONE (DELTASONE) 20 MG Tab    albuterol 108 (90 Base) MCG/ACT Aero Soln inhalation aerosol   2. Pneumonia due to infectious organism, unspecified laterality, unspecified part of lung  azithromycin (ZITHROMAX) 250 MG Tab    predniSONE (DELTASONE) 20 MG Tab    albuterol 108 (90 Base) MCG/ACT Aero Soln inhalation aerosol     Patient's cough is related to his underlying  COPD  Reviewed chest x-ray with patient  Patient does have a history of pneumonia and it feels the same  Solu-Medrol given in clinic  Prescription for steroids start tomorrow  Start Z-Orlin today  Prescription for albuterol inhaler  Educated on medications including side effects  Stressed adequate hydration  Keep appointment with pulmonologist for July 2  Patient will discuss with pulmonologist getting another nebulizer machine that may work in his car  Monitor for worsening or persistent symptoms and if developed chest pain shortness of breath fever fatigue patient is directed to the emergency department  Patient is in agreement with plan of care  Patient is discharged in no acute distress feeling and breathing better    Patient congratulated on his effort to stop smoking    Xray: 1.  No acute cardiopulmonary abnormality identified.    2.  Chronic obstructive pulmonary disease    3.  Left basilar scarring

## 2018-07-02 ENCOUNTER — OFFICE VISIT (OUTPATIENT)
Dept: PULMONOLOGY | Facility: HOSPICE | Age: 68
End: 2018-07-02
Payer: MEDICARE

## 2018-07-02 VITALS
HEIGHT: 73 IN | HEART RATE: 101 BPM | TEMPERATURE: 97.7 F | BODY MASS INDEX: 17.55 KG/M2 | OXYGEN SATURATION: 93 % | DIASTOLIC BLOOD PRESSURE: 70 MMHG | WEIGHT: 132.4 LBS | SYSTOLIC BLOOD PRESSURE: 120 MMHG | RESPIRATION RATE: 16 BRPM

## 2018-07-02 DIAGNOSIS — R91.8 PULMONARY NODULES: ICD-10-CM

## 2018-07-02 DIAGNOSIS — J44.9 CHRONIC OBSTRUCTIVE PULMONARY DISEASE, UNSPECIFIED COPD TYPE (HCC): ICD-10-CM

## 2018-07-02 DIAGNOSIS — Z72.0 TOBACCO ABUSE: ICD-10-CM

## 2018-07-02 PROCEDURE — 99214 OFFICE O/P EST MOD 30 MIN: CPT | Performed by: INTERNAL MEDICINE

## 2018-07-02 RX ORDER — ALBUTEROL SULFATE 90 UG/1
2 AEROSOL, METERED RESPIRATORY (INHALATION) EVERY 6 HOURS PRN
COMMUNITY
End: 2018-07-02 | Stop reason: SDUPTHER

## 2018-07-02 RX ORDER — ALBUTEROL SULFATE 90 UG/1
2 AEROSOL, METERED RESPIRATORY (INHALATION) EVERY 6 HOURS PRN
Qty: 8.5 G | Refills: 2 | Status: SHIPPED | OUTPATIENT
Start: 2018-07-02 | End: 2018-08-22 | Stop reason: SDUPTHER

## 2018-07-02 NOTE — PROGRESS NOTES
Chief Complaint   Patient presents with   • Results     CT results     HPI: This patient is a 66 y.o. Male who returns for pulmonary nodules.  He has been a lifelong smoker with 40 pack years to date, and continues to smoke < pack per day. He is interested in quitting.  Over the past months he is had increased shortness of breath and productive cough.  Low-dose lung cancer screening chest CAT scan on June 1, 2017 showed a 7 mm noncalcified left lower lobe nodule, 5 mm right lower lobe nodule, multiple bilateral <5 mm pulmonary nodules. Of incidental finding this emphysema and parenchymal scarring in the left lower lobe.  A follow-up chest CAT scan in January 2018 showed interval resolution of the 7 mm nodule, stable remaining nodules, mild peripheral interstitial prominence and emphysema.      Past Medical History:   Diagnosis Date   • Cervical spine disease 3/30/2017   • Chronic obstructive pulmonary disease with acute exacerbation (HCC) 3/30/2017   • Dental caries 3/30/2017   • Pneumonia    • Tobacco abuse    • Tonsillitis        Social History     Social History   • Marital status: Single     Spouse name: N/A   • Number of children: N/A   • Years of education: N/A     Occupational History   • Not on file.     Social History Main Topics   • Smoking status: Current Every Day Smoker     Packs/day: 0.50     Years: 50.00     Types: Cigarettes   • Smokeless tobacco: Never Used      Comment: Started smoking in 1968   • Alcohol use 8.4 - 12.6 oz/week     14 - 21 Cans of beer per week      Comment: Average per weekday 20 Average per weekend 6   • Drug use: No   • Sexual activity: No      Comment:  5 years ago     Other Topics Concern   •  Service No   • Blood Transfusions No   • Caffeine Concern No   • Occupational Exposure No   • Hobby Hazards No   • Sleep Concern No   • Stress Concern No   • Weight Concern Yes     lost weight    • Special Diet No   • Back Care No   • Exercise Yes   • Bike Helmet No      "does not ride bike   • Seat Belt Yes   • Self-Exams No     Social History Narrative   • No narrative on file       Family History   Problem Relation Age of Onset   • Cancer Father      lung   • Other Mother      memory loss   • No Known Problems Brother        Current Outpatient Prescriptions on File Prior to Visit   Medication Sig Dispense Refill   • ipratropium-albuterol (DUONEB) 0.5-2.5 (3) MG/3ML nebulizer solution 3 mL by Nebulization route every 6 hours as needed for Shortness of Breath. 120 Vial 0   • lovastatin (MEVACOR) 20 MG Tab Take 1 Tab by mouth every day. 30 Tab 6   • aspirin EC (ECOTRIN) 325 MG Tablet Delayed Response Take 1 Tab by mouth every day. 30 Tab 6   • azithromycin (ZITHROMAX) 250 MG Tab Take 2 tabs day 1  Take 1 tab day 2-5 6 Tab 0   • predniSONE (DELTASONE) 20 MG Tab Take 2 tabs day 1-2 Take 1 tab day 3-5 7 Tab 0   • albuterol 108 (90 Base) MCG/ACT Aero Soln inhalation aerosol Inhale 2 Puffs by mouth every 6 hours as needed for Shortness of Breath. 8.5 g 0     No current facility-administered medications on file prior to visit.        Allergies: Patient has no known allergies.    ROS:   Constitutional: Denies fevers, chills, night sweats, fatigue or weight loss  Eyes: Denies vision loss, pain, drainage, double vision  Ears, Nose, Throat: Denies earache, difficulty hearing, tinnitus, nasal congestion, hoarseness  Cardiovascular: Denies chest pain, tightness, palpitations, orthopnea or edema  Respiratory: As in HPI  Sleep: Denies daytime sleepiness, snoring, apneas, insomnia, morning headaches  GI: Denies heartburn, dysphagia, nausea, abdominal pain, diarrhea or constipation  : Denies frequent urination, hematuria, discharge or painful urination  Musculoskeletal: Denies back pain, painful joints, sore muscles  Neurological: Denies weakness or headaches  Skin: No rashes    Blood pressure 120/70, pulse (!) 101, temperature 36.5 °C (97.7 °F), resp. rate 16, height 1.854 m (6' 1\"), weight 60.1 " kg (132 lb 6.4 oz), SpO2 93 %.    Physical Exam:  Appearance: Well-nourished, well-developed, in no acute distress  HEENT: Normocephalic, atraumatic, white sclera, PERRLA, oropharynx clear  Neck: No adenopathy or masses  Respiratory: no intercostal retractions or accessory muscle use  Lungs auscultation: Clear to auscultation bilaterally, diminished breath sounds  Cardiovascular: Regular rate rhythm. No murmurs, rubs or gallops.  No LE edema  Abdomen: soft, nondistended  Gait: Normal  Digits: No clubbing, cyanosis  Motor: No focal deficits  Orientation: Oriented to time, person and place    Diagnosis:  1. Pulmonary nodules  CT-CHEST (THORAX) W/O   2. Tobacco abuse     3. Chronic obstructive pulmonary disease, unspecified COPD type (HCC)  umeclidinium-vilanterol (ANORO ELLIPTA) 62.5-25 MCG/INH AEROSOL POWDER, BREATH ACTIVATED inhaler    albuterol (VENTOLIN HFA) 108 (90 Base) MCG/ACT Aero Soln inhalation aerosol       Plan:  In terms of patient's pulmonary nodules, the largest nodule in the left lower lobe has resolved, consistent with an inflammatory nodule.  The remaining <5mm nodules are stable.  Recommend surveillance chest CAT scan in January 2019.  Smoking cessation encouraged in counseling was provided.  He has symptoms of worsening COPD, and recommend treatment with inhaled bronchodilators in addition to smoking cessation.  Start Anoro inhaler at 1 puff daily and albuterol HFA 1-2 puffs every 4 hours as needed.  Inhaler instructions provided.  Return in about 6 months (around 1/2/2019) for after CT scan, at lung nodule clinic.

## 2018-07-10 ENCOUNTER — OFFICE VISIT (OUTPATIENT)
Dept: MEDICAL GROUP | Facility: PHYSICIAN GROUP | Age: 68
End: 2018-07-10
Payer: MEDICARE

## 2018-07-10 VITALS
TEMPERATURE: 97.8 F | OXYGEN SATURATION: 95 % | WEIGHT: 139.8 LBS | RESPIRATION RATE: 14 BRPM | BODY MASS INDEX: 18.53 KG/M2 | DIASTOLIC BLOOD PRESSURE: 70 MMHG | SYSTOLIC BLOOD PRESSURE: 130 MMHG | HEART RATE: 82 BPM | HEIGHT: 73 IN

## 2018-07-10 DIAGNOSIS — J44.1 CHRONIC OBSTRUCTIVE PULMONARY DISEASE WITH ACUTE EXACERBATION (HCC): ICD-10-CM

## 2018-07-10 PROCEDURE — 99213 OFFICE O/P EST LOW 20 MIN: CPT | Performed by: NURSE PRACTITIONER

## 2018-07-10 NOTE — ASSESSMENT & PLAN NOTE
Patient is a 67-year-old male with COPD, he does continue to smoke half pack daily.  He has seen pulmonology who evaluated LDCT with him, nodules are stable and recommendation is to repeat this annually.  He does continue on Norco Ellipta once daily, now only using albuterol inhaler once daily, no longer using nebulizer solutions.  Reports that his breathing has significantly improved since the initiation of this medication.  No productive cough, dyspnea on exertion, hemoptysis. He tells me that he isn't really sure why he is here, it looks like the appointment was scheduled last month for COPD exacerbation symptoms, he declined urgent care and so they scheduled him for the first available opening, his symptoms have since resolved.

## 2018-07-10 NOTE — PROGRESS NOTES
[facility]  Primary Care Office Visit - Problem-Oriented        History:     Kobe Morris is a 67 y.o. male who is here today to discuss COPD      Chronic obstructive pulmonary disease with acute exacerbation (CMS-HCC)  Patient is a 67-year-old male with COPD, he does continue to smoke half pack daily.  He has seen pulmonology who evaluated LDCT with him, nodules are stable and recommendation is to repeat this annually.  He does continue on Norco Ellipta once daily, now only using albuterol inhaler once daily, no longer using nebulizer solutions.  Reports that his breathing has significantly improved since the initiation of this medication.  No productive cough, dyspnea on exertion, hemoptysis. He tells me that he isn't really sure why he is here, it looks like the appointment was scheduled last month for COPD exacerbation symptoms, he declined urgent care and so they scheduled him for the first available opening, his symptoms have since resolved.        Past Medical History:   Diagnosis Date   • Cervical spine disease 3/30/2017   • Chronic obstructive pulmonary disease with acute exacerbation (HCC) 3/30/2017   • Dental caries 3/30/2017   • Pneumonia    • Tobacco abuse    • Tonsillitis      History reviewed. No pertinent surgical history.  Social History     Social History   • Marital status: Single     Spouse name: N/A   • Number of children: N/A   • Years of education: N/A     Occupational History   • Not on file.     Social History Main Topics   • Smoking status: Current Every Day Smoker     Packs/day: 0.50     Years: 50.00     Types: Cigarettes   • Smokeless tobacco: Never Used      Comment: Started smoking in 1968   • Alcohol use 8.4 - 12.6 oz/week     14 - 21 Cans of beer per week      Comment: Average per weekday 20 Average per weekend 6   • Drug use: No   • Sexual activity: No      Comment:  5 years ago     Other Topics Concern   •  Service No   • Blood Transfusions No   • Caffeine  Concern No   • Occupational Exposure No   • Hobby Hazards No   • Sleep Concern No   • Stress Concern No   • Weight Concern Yes     lost weight    • Special Diet No   • Back Care No   • Exercise Yes   • Bike Helmet No     does not ride bike   • Seat Belt Yes   • Self-Exams No     Social History Narrative   • No narrative on file     History   Smoking Status   • Current Every Day Smoker   • Packs/day: 0.50   • Years: 50.00   • Types: Cigarettes   Smokeless Tobacco   • Never Used     Comment: Started smoking in 1968     Family History   Problem Relation Age of Onset   • Cancer Father      lung   • Other Mother      memory loss   • No Known Problems Brother      No Known Allergies    Problem List:     Patient Active Problem List    Diagnosis Date Noted   • Other hyperlipidemia 10/19/2017   • Colon cancer screening 10/19/2017   • Chronic obstructive pulmonary disease with acute exacerbation (HCC) 03/30/2017   • Dental caries 03/30/2017   • Cervical spine disease 03/30/2017   • Cerebral microvascular disease 03/30/2017   • Tobacco abuse          Medications:     Current Outpatient Prescriptions:   •  umeclidinium-vilanterol (ANORO ELLIPTA) 62.5-25 MCG/INH AEROSOL POWDER, BREATH ACTIVATED inhaler, Inhale 1 Puff by mouth every day., Disp: 1 Inhaler, Rfl: 6  •  albuterol (VENTOLIN HFA) 108 (90 Base) MCG/ACT Aero Soln inhalation aerosol, Inhale 2 Puffs by mouth every 6 hours as needed for Shortness of Breath., Disp: 8.5 g, Rfl: 2  •  albuterol 108 (90 Base) MCG/ACT Aero Soln inhalation aerosol, Inhale 2 Puffs by mouth every 6 hours as needed for Shortness of Breath., Disp: 8.5 g, Rfl: 0  •  ipratropium-albuterol (DUONEB) 0.5-2.5 (3) MG/3ML nebulizer solution, 3 mL by Nebulization route every 6 hours as needed for Shortness of Breath., Disp: 120 Vial, Rfl: 0  •  lovastatin (MEVACOR) 20 MG Tab, Take 1 Tab by mouth every day., Disp: 30 Tab, Rfl: 6  •  aspirin EC (ECOTRIN) 325 MG Tablet Delayed Response, Take 1 Tab by mouth every  "day., Disp: 30 Tab, Rfl: 6      Review of Systems:     Pertinent positives as per HPI, all other systems reviewed and WNL    Physical Assessment:     VS: /70   Pulse 82   Temp 36.6 °C (97.8 °F)   Resp 14   Ht 1.854 m (6' 0.99\")   Wt 63.4 kg (139 lb 12.8 oz)   SpO2 95%   BMI 18.45 kg/m²     General: Well-developed, well-nourished, male, thin     Head: PERRL, EOMI. Normocephalic. No facial asymmetry noted.  Cardiovasc:RRR, no MRG. No thrills or bruits. Pulses 2+ and symmetric at all distal extremities.  Pulmonary: Lungs diminished throughout. Normal respiratory effort. Neuro: Alert and oriented  Skin:No rashes noted. Skin warm, dry, intact    Psych: Dressed appropriately for the weather, pleasant and conversant.  Affect, mood & judgment appropriate.    Assessment/Plan:   Kobe was seen today for copd.    Diagnoses and all orders for this visit:    Chronic obstructive pulmonary disease with acute exacerbation (HCC), improving   - continue Anoro Ellipta daily, albuterol PRN, return and ER precautions discussed. Follow up with pulmonology in 6 months as previously scheduled.       Patient is agreeable to the above plan and voiced understanding. All questions answered.     Please note that this dictation was created using voice recognition software. I have made every reasonable attempt to correct obvious errors, but I expect that there are errors of grammar and possibly content that I did not discover before finalizing the note.      BALAJI Benavidez  7/10/2018, 11:52 AM  "

## 2018-08-08 ENCOUNTER — PATIENT OUTREACH (OUTPATIENT)
Dept: HEALTH INFORMATION MANAGEMENT | Facility: OTHER | Age: 68
End: 2018-08-08

## 2018-08-15 ENCOUNTER — HOSPITAL ENCOUNTER (OUTPATIENT)
Dept: LAB | Facility: MEDICAL CENTER | Age: 68
End: 2018-08-15
Attending: INTERNAL MEDICINE
Payer: MEDICARE

## 2018-08-15 DIAGNOSIS — E55.9 VITAMIN D DEFICIENCY: ICD-10-CM

## 2018-08-15 LAB — 25(OH)D3 SERPL-MCNC: 22 NG/ML (ref 30–100)

## 2018-08-15 PROCEDURE — 82306 VITAMIN D 25 HYDROXY: CPT

## 2018-08-15 PROCEDURE — 36415 COLL VENOUS BLD VENIPUNCTURE: CPT

## 2018-08-22 ENCOUNTER — OFFICE VISIT (OUTPATIENT)
Dept: MEDICAL GROUP | Facility: PHYSICIAN GROUP | Age: 68
End: 2018-08-22
Payer: MEDICARE

## 2018-08-22 VITALS
BODY MASS INDEX: 17.63 KG/M2 | DIASTOLIC BLOOD PRESSURE: 80 MMHG | OXYGEN SATURATION: 97 % | HEIGHT: 73 IN | WEIGHT: 133 LBS | TEMPERATURE: 98.2 F | HEART RATE: 88 BPM | SYSTOLIC BLOOD PRESSURE: 130 MMHG | RESPIRATION RATE: 16 BRPM

## 2018-08-22 DIAGNOSIS — M48.9 CERVICAL SPINE DISEASE: ICD-10-CM

## 2018-08-22 DIAGNOSIS — Z12.11 COLON CANCER SCREENING: ICD-10-CM

## 2018-08-22 DIAGNOSIS — Z72.0 TOBACCO ABUSE: ICD-10-CM

## 2018-08-22 DIAGNOSIS — E55.9 VITAMIN D DEFICIENCY: ICD-10-CM

## 2018-08-22 DIAGNOSIS — J44.1 CHRONIC OBSTRUCTIVE PULMONARY DISEASE WITH ACUTE EXACERBATION (HCC): ICD-10-CM

## 2018-08-22 DIAGNOSIS — J44.9 CHRONIC OBSTRUCTIVE PULMONARY DISEASE, UNSPECIFIED COPD TYPE (HCC): ICD-10-CM

## 2018-08-22 PROCEDURE — 99214 OFFICE O/P EST MOD 30 MIN: CPT | Performed by: INTERNAL MEDICINE

## 2018-08-22 RX ORDER — ALBUTEROL SULFATE 90 UG/1
2 AEROSOL, METERED RESPIRATORY (INHALATION) EVERY 6 HOURS PRN
Qty: 3 INHALER | Refills: 3 | Status: SHIPPED | OUTPATIENT
Start: 2018-08-22 | End: 2019-06-25 | Stop reason: SDUPTHER

## 2018-08-22 ASSESSMENT — PATIENT HEALTH QUESTIONNAIRE - PHQ9: CLINICAL INTERPRETATION OF PHQ2 SCORE: 0

## 2018-08-22 NOTE — PATIENT INSTRUCTIONS
CT scan of your lung (the tube thing) follow-up will be due in January 2019 with a visit with Dr. Brewer after the CT scan.

## 2018-08-22 NOTE — PROGRESS NOTES
Chief Complaint   Patient presents with   • COPD     fv copd/ MED REFILL VENTOLIN        HISTORY OF PRESENT ILLNESS: Patient is a 67 y.o. male established patient who presents today to discuss the medical issues below.    Chronic obstructive pulmonary disease with acute exacerbation (CMS-HCC)  patient states he is doing well, he is using the Ventolin HVA and not needing the nebulizer as frequently.  Reports using the Ventolin TID.  Patient continues with the Anoro.  Saw Pulmonary and repeat scan due Jan 2019.     Cervical spine disease  Patient currently not following Dr Marcus more than 1 x a year.  Patient is using asa prn.      Tobacco abuse  Continues to work on decreasing smoking, down from 2 ppd to just a few a day.        Patient Active Problem List    Diagnosis Date Noted   • Other hyperlipidemia 10/19/2017   • Colon cancer screening 10/19/2017   • Chronic obstructive pulmonary disease with acute exacerbation (HCC) 03/30/2017   • Dental caries 03/30/2017   • Cervical spine disease 03/30/2017   • Cerebral microvascular disease 03/30/2017   • Tobacco abuse        Allergies:Patient has no known allergies.    Current Outpatient Prescriptions   Medication Sig Dispense Refill   • albuterol (VENTOLIN HFA) 108 (90 Base) MCG/ACT Aero Soln inhalation aerosol Inhale 2 Puffs by mouth every 6 hours as needed for Shortness of Breath. 3 Inhaler 3   • umeclidinium-vilanterol (ANORO ELLIPTA) 62.5-25 MCG/INH AEROSOL POWDER, BREATH ACTIVATED inhaler Inhale 1 Puff by mouth every day. 1 Inhaler 6   • albuterol 108 (90 Base) MCG/ACT Aero Soln inhalation aerosol Inhale 2 Puffs by mouth every 6 hours as needed for Shortness of Breath. 8.5 g 0   • lovastatin (MEVACOR) 20 MG Tab Take 1 Tab by mouth every day. 30 Tab 6   • aspirin EC (ECOTRIN) 325 MG Tablet Delayed Response Take 1 Tab by mouth every day. 30 Tab 6   • ipratropium-albuterol (DUONEB) 0.5-2.5 (3) MG/3ML nebulizer solution 3 mL by Nebulization route every 6 hours as  "needed for Shortness of Breath. 120 Vial 0     No current facility-administered medications for this visit.          Past Medical History:   Diagnosis Date   • Cervical spine disease 3/30/2017   • Chronic obstructive pulmonary disease with acute exacerbation (HCC) 3/30/2017   • Dental caries 3/30/2017   • Pneumonia    • Tobacco abuse    • Tonsillitis        Social History   Substance Use Topics   • Smoking status: Current Every Day Smoker     Packs/day: 0.50     Years: 50.00     Types: Cigarettes   • Smokeless tobacco: Never Used      Comment: Started smoking in    • Alcohol use 8.4 - 12.6 oz/week     14 - 21 Cans of beer per week      Comment: Average per weekday 20 Average per weekend 6       Family Status   Relation Status   • Fa    • Mo Alive   • Bro Alive     Family History   Problem Relation Age of Onset   • Cancer Father         lung   • Other Mother         memory loss   • No Known Problems Brother        ROS:       Cardiovascular: Negative for chest pain, palpitations, orthopnea, dyspnea with exertion or edema.   Gastrointestinal: Negative for GI upset, nausea, vomiting, abdominal pain, constipation or diarrhea.   Genitourinary: Negative for dysuria, urgency, hesitancy or frequency.       Exam:    Blood pressure 130/80, pulse 88, temperature 36.8 °C (98.2 °F), resp. rate 16, height 1.854 m (6' 1\"), weight 60.3 kg (133 lb), SpO2 97 %.  General:  Well nourished, well developed male in NAD.  HENT: Normocephalic, bilateral TMs are intact, nasal and oral mucosa with no lesions,   Neck: Supple without bruit. Thyroid is not enlarged.  Pulmonary: Clear to ausculation and percussion.  Normal effort. No rales, rhonchi, or wheezing.  Cardiovascular: Regular rate and rhythm without murmur.   Abdomen: Normal bowel sounds soft and nontender no palpable liver spleen bladder mass.  Extremities: No LE edema noted.  Neuro: Grossly nonfocal.  Psych: Alert and oriented to person, place, and time. Appropriate mood " and conversation.    LABS: Results reviewed and discussed with the patient, questions answered.      This dictation was created using voice recognition software. I have made reasonable attempts to correct errors, however, errors of grammar and content may exist.          Assessment/Plan:    1. Chronic obstructive pulmonary disease with acute exacerbation (HCC)  Minimal air motion however clinically stable reviewed again implications of cigarette smoking.  Reviewed scheduling for CT follow-up is in January.  - COMP METABOLIC PANEL; Future  - CBC WITH DIFFERENTIAL; Future    2. Cervical spine disease  Patient in the cancer [facility] patient indicates minimally problematic.  Rare visit to Dr. Marcus.  Utilizing aspirin primarily.    3. Tobacco abuse  Significant decrease in amount of smoking.  Implications discussed offered counseling for complete discontinuation, behavior modification.  He is utilizing patch but this is better than ongoing lung exposure.      4. Colon cancer screening  Reviewed again implications.  He states it is difficult for him to find time however implications discussed with the patient, at minimum stool cards.  No family history colon cancer.  - REFERRAL TO GASTROENTEROLOGY  - OCCULT BLOOD FECES IMMUNOASSAY; Future    5. Chronic obstructive pulmonary disease, unspecified COPD type (HCC)  Refills on albuterol he is not requiring nebulizer currently.  Reviewed with patient especially smoking situation.  He states understanding.  - albuterol (VENTOLIN HFA) 108 (90 Base) MCG/ACT Aero Soln inhalation aerosol; Inhale 2 Puffs by mouth every 6 hours as needed for Shortness of Breath.  Dispense: 3 Inhaler; Refill: 3    6. Vitamin D deficiency  Suboptimally controlled discussed doubling dose ongoing lab monitoring.  - VITAMIN D,25 HYDROXY; Future    Patient was seen for 25 minutes face to face of which more than 50% of the time was spent in counseling and coordination of care regarding the above  problems.

## 2018-08-22 NOTE — ASSESSMENT & PLAN NOTE
patient states he is doing well, he is using the Ventolin HVA and not needing the nebulizer as frequently.  Reports using the Ventolin TID.  Patient continues with the Anoro.  Saw Pulmonary and repeat scan due Jan 2019.

## 2018-09-06 ENCOUNTER — OFFICE VISIT (OUTPATIENT)
Dept: URGENT CARE | Facility: PHYSICIAN GROUP | Age: 68
End: 2018-09-06
Payer: MEDICARE

## 2018-09-06 VITALS
HEIGHT: 73 IN | SYSTOLIC BLOOD PRESSURE: 130 MMHG | BODY MASS INDEX: 17.92 KG/M2 | RESPIRATION RATE: 16 BRPM | OXYGEN SATURATION: 94 % | WEIGHT: 135.2 LBS | DIASTOLIC BLOOD PRESSURE: 76 MMHG | HEART RATE: 80 BPM | TEMPERATURE: 97.6 F

## 2018-09-06 DIAGNOSIS — J44.1 COPD WITH ACUTE EXACERBATION (HCC): ICD-10-CM

## 2018-09-06 PROCEDURE — 99214 OFFICE O/P EST MOD 30 MIN: CPT | Performed by: FAMILY MEDICINE

## 2018-09-06 RX ORDER — PREDNISONE 20 MG/1
TABLET ORAL
Qty: 7 TAB | Refills: 0 | Status: SHIPPED | OUTPATIENT
Start: 2018-09-06 | End: 2018-11-16

## 2018-09-06 RX ORDER — AZITHROMYCIN 250 MG/1
TABLET, FILM COATED ORAL
Qty: 6 TAB | Refills: 0 | Status: SHIPPED | OUTPATIENT
Start: 2018-09-06 | End: 2018-11-16

## 2018-09-06 NOTE — PROGRESS NOTES
Chief Complaint:    Chief Complaint   Patient presents with   • Cough     congestion in chest, productive cough       History of Present Illness:    This is a new problem. For 1 week, he has chest congestion and cough productive of purulent mucus, not getting better. No fever. Has COPD and has MDI and nebs to use. Still smoking. Z-eldon and Prednisone worked/tolerated for similar previous symptoms.      Review of Systems:    Constitutional: Negative for fever, chills, and diaphoresis.   Eyes: Negative for change in vision, photophobia, pain, redness, and discharge.  ENT: Negative for ear pain, ear discharge, hearing loss, tinnitus, nasal congestion, nosebleeds, and sore throat.    Respiratory: See HPI.    Cardiovascular: Negative for chest pain, palpitations, orthopnea, claudication, leg swelling, and PND.   Gastrointestinal: Negative for abdominal pain, nausea, vomiting, diarrhea, constipation, blood in stool, and melena.   Genitourinary: Negative for dysuria, urinary urgency, urinary frequency, hematuria, and flank pain.   Musculoskeletal: Negative for myalgias, joint pain, neck pain, and back pain.   Skin: Negative for rash and itching.   Neurological: Negative for dizziness, tingling, tremors, sensory change, speech change, focal weakness, seizures, loss of consciousness, and headaches.   Endo: Negative for polydipsia.   Heme: Does not bruise/bleed easily.   Psychiatric/Behavioral: Negative for depression, suicidal ideas, hallucinations, memory loss and substance abuse. The patient is not nervous/anxious and does not have insomnia.        Past Medical History:    Past Medical History:   Diagnosis Date   • Cervical spine disease 3/30/2017   • Chronic obstructive pulmonary disease with acute exacerbation (HCC) 3/30/2017   • Dental caries 3/30/2017   • Pneumonia    • Tobacco abuse    • Tonsillitis      Past Surgical History:    History reviewed. No pertinent surgical history.    Social History:    Social History      Social History   • Marital status: Single     Spouse name: N/A   • Number of children: N/A   • Years of education: N/A     Occupational History   • Not on file.     Social History Main Topics   • Smoking status: Current Every Day Smoker     Packs/day: 0.50     Years: 50.00     Types: Cigarettes   • Smokeless tobacco: Never Used      Comment: Started smoking in 1968   • Alcohol use 8.4 - 12.6 oz/week     14 - 21 Cans of beer per week      Comment: Average per weekday 20 Average per weekend 6   • Drug use: No   • Sexual activity: No      Comment:  5 years ago     Other Topics Concern   •  Service No   • Blood Transfusions No   • Caffeine Concern No   • Occupational Exposure No   • Hobby Hazards No   • Sleep Concern No   • Stress Concern No   • Weight Concern Yes     lost weight    • Special Diet No   • Back Care No   • Exercise Yes   • Bike Helmet No     does not ride bike   • Seat Belt Yes   • Self-Exams No     Social History Narrative   • No narrative on file     Family History:    Family History   Problem Relation Age of Onset   • Cancer Father         lung   • Other Mother         memory loss   • No Known Problems Brother      Medications:    Current Outpatient Prescriptions on File Prior to Visit   Medication Sig Dispense Refill   • aspirin EC (ECOTRIN) 325 MG Tablet Delayed Response Take 1 Tab by mouth every day. 30 Tab 6   • albuterol (VENTOLIN HFA) 108 (90 Base) MCG/ACT Aero Soln inhalation aerosol Inhale 2 Puffs by mouth every 6 hours as needed for Shortness of Breath. 3 Inhaler 3   • umeclidinium-vilanterol (ANORO ELLIPTA) 62.5-25 MCG/INH AEROSOL POWDER, BREATH ACTIVATED inhaler Inhale 1 Puff by mouth every day. 1 Inhaler 6   • albuterol 108 (90 Base) MCG/ACT Aero Soln inhalation aerosol Inhale 2 Puffs by mouth every 6 hours as needed for Shortness of Breath. 8.5 g 0   • ipratropium-albuterol (DUONEB) 0.5-2.5 (3) MG/3ML nebulizer solution 3 mL by Nebulization route every 6 hours as needed  "for Shortness of Breath. 120 Vial 0   • lovastatin (MEVACOR) 20 MG Tab Take 1 Tab by mouth every day. 30 Tab 6     No current facility-administered medications on file prior to visit.      Allergies:    No Known Allergies      Vitals:    Vitals:    09/06/18 0917   BP: 130/76   Pulse: 80   Resp: 16   Temp: 36.4 °C (97.6 °F)   SpO2: 94%   Weight: 61.3 kg (135 lb 3.2 oz)   Height: 1.854 m (6' 1\")       Physical Exam:    Constitutional: Vital signs reviewed. Appears well-developed and well-nourished. Occl congested coughing. No acute distress.   Eyes: Sclera white, conjunctivae clear.   ENT: External ears normal. Hearing normal.   Neck: Neck supple.   Cardiovascular: Regular rate and rhythm. No murmur.  Pulmonary/Chest: Respirations non-labored. Mild rales, rhonchi, and wheezing bilaterally.  Musculoskeletal: Normal gait. No muscular atrophy or weakness.  Neurological: Alert and oriented to person, place, and time. Muscle tone normal. Coordination normal.   Skin: No rashes or lesions. Warm, dry, normal turgor.  Psychiatric: Normal mood and affect. Behavior is normal. Judgment and thought content normal.       Assessment / Plan:    1. COPD with acute exacerbation (HCC)  - azithromycin (ZITHROMAX) 250 MG Tab; 2 TABS ON DAY 1, 1 TAB ON DAYS 2-5.  Dispense: 6 Tab; Refill: 0  - predniSONE (DELTASONE) 20 MG Tab; 2 TABS ONCE A DAY ON DAYS 1-2, 1 TAB ONCE A DAY ON DAYS 3-5. TAKE WITH FOOD.  Dispense: 7 Tab; Refill: 0      Discussed with him DDX, management options, and risks, benefits, and alternatives to treatment plan agreed upon.    Rec'd stop smoking.    Agreeable to medications prescribed.    Discussed expected course of duration, time for improvement, and to seek follow-up in Emergency Room, urgent care, or with PCP if getting worse at any time or not improving within expected time frame.  "

## 2018-11-16 ENCOUNTER — OFFICE VISIT (OUTPATIENT)
Dept: URGENT CARE | Facility: PHYSICIAN GROUP | Age: 68
End: 2018-11-16
Payer: MEDICARE

## 2018-11-16 ENCOUNTER — APPOINTMENT (OUTPATIENT)
Dept: RADIOLOGY | Facility: IMAGING CENTER | Age: 68
End: 2018-11-16
Attending: PHYSICIAN ASSISTANT
Payer: MEDICARE

## 2018-11-16 VITALS
OXYGEN SATURATION: 91 % | TEMPERATURE: 98.7 F | WEIGHT: 128.2 LBS | SYSTOLIC BLOOD PRESSURE: 114 MMHG | HEART RATE: 99 BPM | HEIGHT: 73 IN | DIASTOLIC BLOOD PRESSURE: 68 MMHG | BODY MASS INDEX: 16.99 KG/M2 | RESPIRATION RATE: 20 BRPM

## 2018-11-16 DIAGNOSIS — J22 LRTI (LOWER RESPIRATORY TRACT INFECTION): ICD-10-CM

## 2018-11-16 DIAGNOSIS — R05.9 COUGH: ICD-10-CM

## 2018-11-16 DIAGNOSIS — J44.1 CHRONIC OBSTRUCTIVE PULMONARY DISEASE WITH ACUTE EXACERBATION (HCC): ICD-10-CM

## 2018-11-16 PROCEDURE — 71046 X-RAY EXAM CHEST 2 VIEWS: CPT | Mod: 26 | Performed by: PHYSICIAN ASSISTANT

## 2018-11-16 PROCEDURE — 99214 OFFICE O/P EST MOD 30 MIN: CPT | Mod: 25 | Performed by: PHYSICIAN ASSISTANT

## 2018-11-16 PROCEDURE — 94640 AIRWAY INHALATION TREATMENT: CPT | Performed by: PHYSICIAN ASSISTANT

## 2018-11-16 RX ORDER — IPRATROPIUM BROMIDE AND ALBUTEROL SULFATE 2.5; .5 MG/3ML; MG/3ML
3 SOLUTION RESPIRATORY (INHALATION) ONCE
Status: COMPLETED | OUTPATIENT
Start: 2018-11-16 | End: 2018-11-16

## 2018-11-16 RX ORDER — PREDNISONE 20 MG/1
TABLET ORAL
Qty: 10 TAB | Refills: 0 | Status: SHIPPED | OUTPATIENT
Start: 2018-11-16 | End: 2019-05-22

## 2018-11-16 RX ORDER — DOXYCYCLINE HYCLATE 100 MG
100 TABLET ORAL 2 TIMES DAILY
Qty: 20 TAB | Refills: 0 | Status: SHIPPED | OUTPATIENT
Start: 2018-11-16 | End: 2018-11-26

## 2018-11-16 RX ADMIN — IPRATROPIUM BROMIDE AND ALBUTEROL SULFATE 3 ML: 2.5; .5 SOLUTION RESPIRATORY (INHALATION) at 12:07

## 2018-11-16 ASSESSMENT — ENCOUNTER SYMPTOMS
COUGH: 1
CARDIOVASCULAR NEGATIVE: 1
SORE THROAT: 0
GASTROINTESTINAL NEGATIVE: 1
NEUROLOGICAL NEGATIVE: 1
WHEEZING: 1
CHILLS: 0
SPUTUM PRODUCTION: 1
SHORTNESS OF BREATH: 1
MUSCULOSKELETAL NEGATIVE: 1
FEVER: 0
SINUS PAIN: 0

## 2018-11-16 ASSESSMENT — COPD QUESTIONNAIRES: COPD: 1

## 2018-11-16 NOTE — PROGRESS NOTES
Subjective:      Kobe Morris is a 68 y.o. male who presents with Cough (productive cough, congestion)            Cough   This is a new problem. The current episode started 1 to 4 weeks ago. The problem has been gradually worsening. The problem occurs every few minutes. The cough is productive of sputum and productive of brown sputum. Associated symptoms include shortness of breath and wheezing. Pertinent negatives include no chills, ear congestion, ear pain, fever, nasal congestion, postnasal drip or sore throat. He has tried a beta-agonist inhaler for the symptoms. The treatment provided no relief. His past medical history is significant for bronchitis, COPD, emphysema and pneumonia.       PMH:  has a past medical history of Cervical spine disease (3/30/2017); Chronic obstructive pulmonary disease with acute exacerbation (HCC) (3/30/2017); Dental caries (3/30/2017); Pneumonia; Tobacco abuse; and Tonsillitis. He also has no past medical history of Diabetes; Encounter for long-term (current) use of other medications; or Hypertension.  MEDS:   Current Outpatient Prescriptions:   •  azithromycin (ZITHROMAX) 250 MG Tab, 2 TABS ON DAY 1, 1 TAB ON DAYS 2-5. (Patient not taking: Reported on 11/16/2018), Disp: 6 Tab, Rfl: 0  •  predniSONE (DELTASONE) 20 MG Tab, 2 TABS ONCE A DAY ON DAYS 1-2, 1 TAB ONCE A DAY ON DAYS 3-5. TAKE WITH FOOD. (Patient not taking: Reported on 11/16/2018), Disp: 7 Tab, Rfl: 0  •  albuterol (VENTOLIN HFA) 108 (90 Base) MCG/ACT Aero Soln inhalation aerosol, Inhale 2 Puffs by mouth every 6 hours as needed for Shortness of Breath. (Patient not taking: Reported on 11/16/2018), Disp: 3 Inhaler, Rfl: 3  •  umeclidinium-vilanterol (ANORO ELLIPTA) 62.5-25 MCG/INH AEROSOL POWDER, BREATH ACTIVATED inhaler, Inhale 1 Puff by mouth every day. (Patient not taking: Reported on 11/16/2018), Disp: 1 Inhaler, Rfl: 6  •  albuterol 108 (90 Base) MCG/ACT Aero Soln inhalation aerosol, Inhale 2 Puffs by mouth every 6  "hours as needed for Shortness of Breath. (Patient not taking: Reported on 11/16/2018), Disp: 8.5 g, Rfl: 0  •  ipratropium-albuterol (DUONEB) 0.5-2.5 (3) MG/3ML nebulizer solution, 3 mL by Nebulization route every 6 hours as needed for Shortness of Breath. (Patient not taking: Reported on 11/16/2018), Disp: 120 Vial, Rfl: 0  •  lovastatin (MEVACOR) 20 MG Tab, Take 1 Tab by mouth every day. (Patient not taking: Reported on 11/16/2018), Disp: 30 Tab, Rfl: 6  •  aspirin EC (ECOTRIN) 325 MG Tablet Delayed Response, Take 1 Tab by mouth every day. (Patient not taking: Reported on 11/16/2018), Disp: 30 Tab, Rfl: 6  ALLERGIES: No Known Allergies  SURGHX: No past surgical history on file.  SOCHX:  reports that he has been smoking Cigarettes.  He has a 25.00 pack-year smoking history. He has never used smokeless tobacco. He reports that he drinks about 8.4 - 12.6 oz of alcohol per week . He reports that he does not use drugs.  FH: family history includes Cancer in his father; No Known Problems in his brother; Other in his mother.    Review of Systems   Constitutional: Positive for malaise/fatigue. Negative for chills and fever.   HENT: Negative for congestion, ear pain, postnasal drip, sinus pain and sore throat.    Respiratory: Positive for cough, sputum production, shortness of breath and wheezing.    Cardiovascular: Negative.    Gastrointestinal: Negative.    Musculoskeletal: Negative.    Neurological: Negative.        Medications, Allergies, and current problem list reviewed today in Epic     Objective:     /68   Pulse 99   Temp 37.1 °C (98.7 °F)   Resp 20   Ht 1.854 m (6' 1\")   Wt 58.2 kg (128 lb 3.2 oz)   SpO2 91%   BMI 16.91 kg/m²      Physical Exam   Constitutional: He is oriented to person, place, and time. He appears well-developed and well-nourished. No distress.   HENT:   Head: Normocephalic and atraumatic.   Right Ear: Tympanic membrane and external ear normal.   Left Ear: Tympanic membrane and " external ear normal.   Nose: Nose normal.   Mouth/Throat: Oropharynx is clear and moist. No oropharyngeal exudate.   Eyes: Conjunctivae are normal. Right eye exhibits no discharge. Left eye exhibits no discharge.   Neck: Normal range of motion. Neck supple.   Cardiovascular: Normal rate, regular rhythm and normal heart sounds.    No murmur heard.  Pulmonary/Chest: Effort normal. No respiratory distress. He has decreased breath sounds. He has wheezes. He has rhonchi. He has no rales.   Abdominal: Soft. He exhibits no distension.   Lymphadenopathy:     He has no cervical adenopathy.   Neurological: He is alert and oriented to person, place, and time.   Skin: Skin is warm and dry. He is not diaphoretic.   Psychiatric: He has a normal mood and affect. His behavior is normal. Judgment and thought content normal.   Nursing note and vitals reviewed.         Xray: Left lower lobe findings by my read. Radiology review pending.       Assessment/Plan:     Impression       Blunting of the left costophrenic angle likely related to a small pleural effusion or pleural thickening.    Left basilar opacities likely represent atelectasis versus scarring.    Hyperinflation.     1. Cough  DX-CHEST-2 VIEWS    ipratropium-albuterol (DUONEB) nebulizer solution    predniSONE (DELTASONE) 20 MG Tab   2. LRTI (lower respiratory tract infection)  doxycycline (VIBRAMYCIN) 100 MG Tab    predniSONE (DELTASONE) 20 MG Tab    cefTRIAXone (ROCEPHIN) 500 mg, lidocaine (XYLOCAINE) 1 % 1.8 mL for IM use   3. Chronic obstructive pulmonary disease with acute exacerbation (HCC)  predniSONE (DELTASONE) 20 MG Tab     Productive cough, shortness of breath.  Decreased pulse ox, some wheezing and decreased breath sounds bilaterally.  X-ray showed left lower lobe findings.  Patient does have a history of COPD therefore he will be treated aggressively for acute bacterial infection.  DuoNeb treatment in clinic  Rocephin injection in clinic  Doxycycline and  prednisone  OTC meds and conservative measures as discussed  Return to clinic or go to ED if symptoms worsen or persist. Indications for ED discussed at length. Patient voices understanding. Follow-up with your primary care provider in 3-5 days. Red flags discussed. All side effects of medication discussed including allergic response, GI upset, tendon injury, etc.    Please note that this dictation was created using voice recognition software. I have made every reasonable attempt to correct obvious errors, but I expect that there are errors of grammar and possibly content that I did not discover before finalizing the note.

## 2018-12-31 ENCOUNTER — TELEPHONE (OUTPATIENT)
Dept: PULMONOLOGY | Facility: HOSPICE | Age: 68
End: 2018-12-31

## 2018-12-31 NOTE — TELEPHONE ENCOUNTER
I left a voicemail for the patient to see if he had his CT Chest W/O contrast done prior to his upcoming appointment with  on 01/08/19. Waiting a callback.

## 2019-01-03 ENCOUNTER — HOSPITAL ENCOUNTER (OUTPATIENT)
Dept: RADIOLOGY | Facility: MEDICAL CENTER | Age: 69
End: 2019-01-03
Attending: INTERNAL MEDICINE
Payer: MEDICARE

## 2019-01-03 DIAGNOSIS — R91.8 PULMONARY NODULES: ICD-10-CM

## 2019-01-03 PROCEDURE — 71250 CT THORAX DX C-: CPT

## 2019-01-08 ENCOUNTER — OFFICE VISIT (OUTPATIENT)
Dept: PULMONOLOGY | Facility: HOSPICE | Age: 69
End: 2019-01-08
Payer: MEDICARE

## 2019-01-08 VITALS
TEMPERATURE: 97.9 F | HEART RATE: 95 BPM | SYSTOLIC BLOOD PRESSURE: 122 MMHG | RESPIRATION RATE: 16 BRPM | WEIGHT: 127.2 LBS | HEIGHT: 73 IN | OXYGEN SATURATION: 94 % | BODY MASS INDEX: 16.86 KG/M2 | DIASTOLIC BLOOD PRESSURE: 82 MMHG

## 2019-01-08 DIAGNOSIS — J18.9 PNEUMONIA OF LEFT LOWER LOBE DUE TO INFECTIOUS ORGANISM: ICD-10-CM

## 2019-01-08 DIAGNOSIS — J44.9 CHRONIC OBSTRUCTIVE PULMONARY DISEASE, UNSPECIFIED COPD TYPE (HCC): ICD-10-CM

## 2019-01-08 DIAGNOSIS — R91.8 PULMONARY NODULES: ICD-10-CM

## 2019-01-08 DIAGNOSIS — Z72.0 TOBACCO ABUSE: ICD-10-CM

## 2019-01-08 PROCEDURE — 99214 OFFICE O/P EST MOD 30 MIN: CPT | Performed by: INTERNAL MEDICINE

## 2019-01-08 RX ORDER — LEVOFLOXACIN 500 MG/1
500 TABLET, FILM COATED ORAL DAILY
Qty: 10 TAB | Refills: 0 | Status: SHIPPED | OUTPATIENT
Start: 2019-01-08 | End: 2019-05-22

## 2019-01-08 NOTE — PROGRESS NOTES
Chief Complaint   Patient presents with   • Results     CT results       HPI: This patient is a 68 y.o. Male who returns for pulmonary nodules.  He has been a lifelong smoker with 40 pack years to date, and continues to smoke. He has weaned to < 1/2 pack per day.  Low-dose lung cancer screening chest CAT scan on June 1, 2017 showed a 7 mm noncalcified left lower lobe nodule, 5 mm right lower lobe nodule, multiple bilateral <5 mm pulmonary nodules. Of incidental finding was emphysema and parenchymal scarring in the left lower lobe.  A follow-up chest CAT scan in January 2018 showed interval resolution of the 7 mm nodule, stable remaining nodules.  Over the past 2 months he has developed a productive cough, and was seen in urgent care on November 6, 2018 and prescribed doxycycline and prednisone, without subjective benefit.  Chest x-ray at that time showed left basilar opacity.  Follow-up chest CAT scan on January 3, 2019 was reviewed and shows left lower lobe infiltrate consistent with pneumonia, stable pulmonary nodules.   He denies worsening dyspnea, hemoptysis, fevers or chills.  He has rare albuterol rescue inhaler use.  He was prescribed Anoro however it was over $300 monthly and did not fill his prescription.    Past Medical History:   Diagnosis Date   • Cervical spine disease 3/30/2017   • Chronic obstructive pulmonary disease with acute exacerbation (HCC) 3/30/2017   • Dental caries 3/30/2017   • Pneumonia    • Tobacco abuse    • Tonsillitis        Social History     Social History   • Marital status: Single     Spouse name: N/A   • Number of children: N/A   • Years of education: N/A     Occupational History   • Not on file.     Social History Main Topics   • Smoking status: Current Every Day Smoker     Packs/day: 0.50     Years: 50.00     Types: Cigarettes   • Smokeless tobacco: Never Used      Comment: Started smoking in 1968   • Alcohol use 8.4 - 12.6 oz/week     14 - 21 Cans of beer per week      Comment:  Average per weekday 20 Average per weekend 6   • Drug use: No   • Sexual activity: No      Comment:  5 years ago     Other Topics Concern   •  Service No   • Blood Transfusions No   • Caffeine Concern No   • Occupational Exposure No   • Hobby Hazards No   • Sleep Concern No   • Stress Concern No   • Weight Concern Yes     lost weight    • Special Diet No   • Back Care No   • Exercise Yes   • Bike Helmet No     does not ride bike   • Seat Belt Yes   • Self-Exams No     Social History Narrative   • No narrative on file       Family History   Problem Relation Age of Onset   • Cancer Father         lung   • Other Mother         memory loss   • No Known Problems Brother        Current Outpatient Prescriptions on File Prior to Visit   Medication Sig Dispense Refill   • predniSONE (DELTASONE) 20 MG Tab Take 2 tabs PO QD x 5 days. 10 Tab 0   • albuterol (VENTOLIN HFA) 108 (90 Base) MCG/ACT Aero Soln inhalation aerosol Inhale 2 Puffs by mouth every 6 hours as needed for Shortness of Breath. 3 Inhaler 3   • umeclidinium-vilanterol (ANORO ELLIPTA) 62.5-25 MCG/INH AEROSOL POWDER, BREATH ACTIVATED inhaler Inhale 1 Puff by mouth every day. 1 Inhaler 6   • lovastatin (MEVACOR) 20 MG Tab Take 1 Tab by mouth every day. 30 Tab 6   • aspirin EC (ECOTRIN) 325 MG Tablet Delayed Response Take 1 Tab by mouth every day. 30 Tab 6   • albuterol 108 (90 Base) MCG/ACT Aero Soln inhalation aerosol Inhale 2 Puffs by mouth every 6 hours as needed for Shortness of Breath. (Patient not taking: Reported on 11/16/2018) 8.5 g 0   • ipratropium-albuterol (DUONEB) 0.5-2.5 (3) MG/3ML nebulizer solution 3 mL by Nebulization route every 6 hours as needed for Shortness of Breath. (Patient not taking: Reported on 11/16/2018) 120 Vial 0     No current facility-administered medications on file prior to visit.        Allergies: Patient has no known allergies.    ROS:   Constitutional: Denies fevers, chills, night sweats, fatigue or weight  "loss  Eyes: Denies vision loss, pain, drainage, double vision  Ears, Nose, Throat: Denies earache, difficulty hearing, tinnitus, nasal congestion, hoarseness  Cardiovascular: Denies chest pain, tightness, palpitations, orthopnea or edema  Respiratory: As in HPI  Sleep: Denies daytime sleepiness, snoring, apneas, insomnia, morning headaches  GI: Denies heartburn, dysphagia, nausea, abdominal pain, diarrhea or constipation  : Denies frequent urination, hematuria, discharge or painful urination  Musculoskeletal: Denies back pain, painful joints, sore muscles  Neurological: Denies weakness or headaches  Skin: No rashes    Blood pressure 122/82, pulse 95, temperature 36.6 °C (97.9 °F), temperature source Temporal, resp. rate 16, height 1.854 m (6' 1\"), weight 57.7 kg (127 lb 3.2 oz), SpO2 94 %.    Physical Exam:  Appearance: Well-nourished, well-developed, in no acute distress  HEENT: Normocephalic, atraumatic, white sclera, PERRLA, oropharynx clear  Neck: No adenopathy or masses  Respiratory: no intercostal retractions or accessory muscle use  Lungs auscultation: Clear to auscultation bilaterally, diminished breath sounds  Cardiovascular: Regular rate rhythm. No murmurs, rubs or gallops.  No LE edema  Abdomen: soft, nondistended  Gait: Normal  Digits: No clubbing, cyanosis  Motor: No focal deficits  Orientation: Oriented to time, person and place    Diagnosis:  1. Pneumonia of left lower lobe due to infectious organism (HCC)  DX-CHEST-2 VIEWS    levoFLOXacin (LEVAQUIN) 500 MG tablet   2. Pulmonary nodules     3. Chronic obstructive pulmonary disease, unspecified COPD type (HCC)     4. Tobacco abuse         Plan:  The patient has left lower lobe pneumonia-recommend Levaquin 500 mg daily for 10 days.  Follow-up chest x-ray within 8 weeks.  Continue albuterol HFA as needed.  His pulmonary nodules are stable- follow-up chest CAT scan without contrast advised in 6 months.  Smoking cessation counseling provided.  Return in " about 8 weeks (around 3/5/2019) for with CXR, at lung nodule clinic.

## 2019-02-15 ENCOUNTER — HOSPITAL ENCOUNTER (OUTPATIENT)
Dept: LAB | Facility: MEDICAL CENTER | Age: 69
End: 2019-02-15
Attending: INTERNAL MEDICINE
Payer: MEDICARE

## 2019-02-15 DIAGNOSIS — E55.9 VITAMIN D DEFICIENCY: ICD-10-CM

## 2019-02-15 DIAGNOSIS — J44.1 CHRONIC OBSTRUCTIVE PULMONARY DISEASE WITH ACUTE EXACERBATION (HCC): ICD-10-CM

## 2019-02-15 LAB
25(OH)D3 SERPL-MCNC: 18 NG/ML (ref 30–100)
ALBUMIN SERPL BCP-MCNC: 4.4 G/DL (ref 3.2–4.9)
ALBUMIN/GLOB SERPL: 1.5 G/DL
ALP SERPL-CCNC: 83 U/L (ref 30–99)
ALT SERPL-CCNC: 11 U/L (ref 2–50)
ANION GAP SERPL CALC-SCNC: 6 MMOL/L (ref 0–11.9)
AST SERPL-CCNC: 20 U/L (ref 12–45)
BASOPHILS # BLD AUTO: 1 % (ref 0–1.8)
BASOPHILS # BLD: 0.07 K/UL (ref 0–0.12)
BILIRUB SERPL-MCNC: 0.7 MG/DL (ref 0.1–1.5)
BUN SERPL-MCNC: 7 MG/DL (ref 8–22)
CALCIUM SERPL-MCNC: 9.6 MG/DL (ref 8.5–10.5)
CHLORIDE SERPL-SCNC: 97 MMOL/L (ref 96–112)
CO2 SERPL-SCNC: 31 MMOL/L (ref 20–33)
CREAT SERPL-MCNC: 0.68 MG/DL (ref 0.5–1.4)
EOSINOPHIL # BLD AUTO: 0.24 K/UL (ref 0–0.51)
EOSINOPHIL NFR BLD: 3.6 % (ref 0–6.9)
ERYTHROCYTE [DISTWIDTH] IN BLOOD BY AUTOMATED COUNT: 42.8 FL (ref 35.9–50)
GLOBULIN SER CALC-MCNC: 2.9 G/DL (ref 1.9–3.5)
GLUCOSE SERPL-MCNC: 107 MG/DL (ref 65–99)
HCT VFR BLD AUTO: 49.7 % (ref 42–52)
HGB BLD-MCNC: 16.9 G/DL (ref 14–18)
IMM GRANULOCYTES # BLD AUTO: 0.03 K/UL (ref 0–0.11)
IMM GRANULOCYTES NFR BLD AUTO: 0.4 % (ref 0–0.9)
LYMPHOCYTES # BLD AUTO: 1.14 K/UL (ref 1–4.8)
LYMPHOCYTES NFR BLD: 17.1 % (ref 22–41)
MCH RBC QN AUTO: 33.3 PG (ref 27–33)
MCHC RBC AUTO-ENTMCNC: 34 G/DL (ref 33.7–35.3)
MCV RBC AUTO: 97.8 FL (ref 81.4–97.8)
MONOCYTES # BLD AUTO: 0.58 K/UL (ref 0–0.85)
MONOCYTES NFR BLD AUTO: 8.7 % (ref 0–13.4)
NEUTROPHILS # BLD AUTO: 4.61 K/UL (ref 1.82–7.42)
NEUTROPHILS NFR BLD: 69.2 % (ref 44–72)
NRBC # BLD AUTO: 0 K/UL
NRBC BLD-RTO: 0 /100 WBC
PLATELET # BLD AUTO: 348 K/UL (ref 164–446)
PMV BLD AUTO: 11.1 FL (ref 9–12.9)
POTASSIUM SERPL-SCNC: 4.4 MMOL/L (ref 3.6–5.5)
PROT SERPL-MCNC: 7.3 G/DL (ref 6–8.2)
RBC # BLD AUTO: 5.08 M/UL (ref 4.7–6.1)
SODIUM SERPL-SCNC: 134 MMOL/L (ref 135–145)
WBC # BLD AUTO: 6.7 K/UL (ref 4.8–10.8)

## 2019-02-15 PROCEDURE — 36415 COLL VENOUS BLD VENIPUNCTURE: CPT

## 2019-02-15 PROCEDURE — 85025 COMPLETE CBC W/AUTO DIFF WBC: CPT

## 2019-02-15 PROCEDURE — 82306 VITAMIN D 25 HYDROXY: CPT

## 2019-02-15 PROCEDURE — 80053 COMPREHEN METABOLIC PANEL: CPT

## 2019-02-22 ENCOUNTER — OFFICE VISIT (OUTPATIENT)
Dept: MEDICAL GROUP | Facility: PHYSICIAN GROUP | Age: 69
End: 2019-02-22
Payer: MEDICARE

## 2019-02-22 VITALS
OXYGEN SATURATION: 95 % | HEIGHT: 73 IN | DIASTOLIC BLOOD PRESSURE: 80 MMHG | BODY MASS INDEX: 16.3 KG/M2 | WEIGHT: 123 LBS | RESPIRATION RATE: 16 BRPM | TEMPERATURE: 98.4 F | HEART RATE: 97 BPM | SYSTOLIC BLOOD PRESSURE: 120 MMHG

## 2019-02-22 DIAGNOSIS — E55.9 VITAMIN D DEFICIENCY: ICD-10-CM

## 2019-02-22 DIAGNOSIS — J18.9 PNEUMONIA DUE TO INFECTIOUS ORGANISM, UNSPECIFIED LATERALITY, UNSPECIFIED PART OF LUNG: ICD-10-CM

## 2019-02-22 DIAGNOSIS — Z72.0 TOBACCO ABUSE: ICD-10-CM

## 2019-02-22 DIAGNOSIS — E78.49 OTHER HYPERLIPIDEMIA: ICD-10-CM

## 2019-02-22 DIAGNOSIS — J44.1 CHRONIC OBSTRUCTIVE PULMONARY DISEASE WITH ACUTE EXACERBATION (HCC): ICD-10-CM

## 2019-02-22 PROCEDURE — 99214 OFFICE O/P EST MOD 30 MIN: CPT | Performed by: INTERNAL MEDICINE

## 2019-02-22 RX ORDER — CEFUROXIME AXETIL 250 MG/1
250 TABLET ORAL 2 TIMES DAILY
Qty: 20 TAB | Refills: 0 | Status: SHIPPED | OUTPATIENT
Start: 2019-02-22 | End: 2019-05-22

## 2019-02-22 NOTE — ASSESSMENT & PLAN NOTE
Patient has seen Dr Brewer 1/8/19, felt to have pneumonia, rx course of levaquin and did feel much better on the medication.  Unfortunately after he completed the course of Levaquin he has had gradual recurrence of symptoms.  He currently feels like he has pneumonia again.  He describes cough productive productive of thick creamy color.  No fevers chills.  No significant change in dyspnea.  Patient is utilizing his nebulizer as DuoNeb at twice daily.  Patient reports he is smoking about a half a pack per day.

## 2019-02-22 NOTE — PROGRESS NOTES
Chief Complaint   Patient presents with   • Follow-Up     6 month F/U. Pt states he still feels symptomatic of pneumonia.       HISTORY OF PRESENT ILLNESS: Patient is a 68 y.o. male established patient who presents today to discuss the medical issues below.    Chronic obstructive pulmonary disease with acute exacerbation (CMS-HCC)  Patient has seen Dr Brewer 1/8/19, felt to have pneumonia, rx course of levaquin and did feel much better on the medication.  Unfortunately after he completed the course of Levaquin he has had gradual recurrence of symptoms.  He currently feels like he has pneumonia again.  He describes cough productive productive of thick creamy color.  No fevers chills.  No significant change in dyspnea.  Patient is utilizing his nebulizer as DuoNeb at twice daily.  Patient reports he is smoking about a half a pack per day.      Other hyperlipidemia  Patient reports he is not taking the lovastatin.    Tobacco abuse  Patient report he continues to try to stop smoking.  He is cut way back.  He did purchase the patches.      Patient Active Problem List    Diagnosis Date Noted   • Other hyperlipidemia 10/19/2017   • Colon cancer screening 10/19/2017   • Chronic obstructive pulmonary disease with acute exacerbation (HCC) 03/30/2017   • Dental caries 03/30/2017   • Cervical spine disease 03/30/2017   • Cerebral microvascular disease 03/30/2017   • Tobacco abuse        Allergies:Patient has no known allergies.    Current Outpatient Prescriptions   Medication Sig Dispense Refill   • cefUROXime (CEFTIN) 250 MG Tab Take 1 Tab by mouth 2 times a day. 20 Tab 0   • albuterol (VENTOLIN HFA) 108 (90 Base) MCG/ACT Aero Soln inhalation aerosol Inhale 2 Puffs by mouth every 6 hours as needed for Shortness of Breath. 3 Inhaler 3   • aspirin EC (ECOTRIN) 325 MG Tablet Delayed Response Take 1 Tab by mouth every day. 30 Tab 6   • levoFLOXacin (LEVAQUIN) 500 MG tablet Take 1 Tab by mouth every day. (Patient not taking:  Reported on 2019) 10 Tab 0   • predniSONE (DELTASONE) 20 MG Tab Take 2 tabs PO QD x 5 days. (Patient not taking: Reported on 2019) 10 Tab 0   • umeclidinium-vilanterol (ANORO ELLIPTA) 62.5-25 MCG/INH AEROSOL POWDER, BREATH ACTIVATED inhaler Inhale 1 Puff by mouth every day. (Patient not taking: Reported on 2019) 1 Inhaler 6   • albuterol 108 (90 Base) MCG/ACT Aero Soln inhalation aerosol Inhale 2 Puffs by mouth every 6 hours as needed for Shortness of Breath. (Patient not taking: Reported on 2018) 8.5 g 0   • ipratropium-albuterol (DUONEB) 0.5-2.5 (3) MG/3ML nebulizer solution 3 mL by Nebulization route every 6 hours as needed for Shortness of Breath. (Patient not taking: Reported on 2018) 120 Vial 0   • lovastatin (MEVACOR) 20 MG Tab Take 1 Tab by mouth every day. (Patient not taking: Reported on 2019) 30 Tab 6     No current facility-administered medications for this visit.          Past Medical History:   Diagnosis Date   • Cervical spine disease 3/30/2017   • Chronic obstructive pulmonary disease with acute exacerbation (HCC) 3/30/2017   • Dental caries 3/30/2017   • Pneumonia    • Tobacco abuse    • Tonsillitis        Social History   Substance Use Topics   • Smoking status: Current Every Day Smoker     Packs/day: 0.50     Years: 50.00     Types: Cigarettes   • Smokeless tobacco: Never Used      Comment: Started smoking in    • Alcohol use 8.4 - 12.6 oz/week     14 - 21 Cans of beer per week      Comment: Average per weekday 20 Average per weekend 6       Family Status   Relation Status   • Fa    • Mo Alive   • Bro Alive     Family History   Problem Relation Age of Onset   • Cancer Father         lung   • Other Mother         memory loss   • No Known Problems Brother        ROS:    Cardiovascular: Negative for chest pain, palpitations, orthopnea, dyspnea with exertion or edema.   Gastrointestinal: Negative for GI upset, nausea, vomiting, abdominal pain, constipation  "or diarrhea.   Genitourinary: Negative for dysuria, urgency, hesitancy or frequency.       Exam:    Blood pressure 120/80, pulse 97, temperature 36.9 °C (98.4 °F), resp. rate 16, height 1.854 m (6' 1\"), weight 55.8 kg (123 lb), SpO2 95 %.  General:  Well nourished, well developed male in NAD.  Pulmonary: Coarse breath sounds rhonchi at the left lung base persisting after deep cough no wheezes  Cardiovascular: Regular rate and rhythm without murmur.   Abdomen: Normal bowel sounds soft and nontender no palpable liver spleen bladder mass.  Extremities: No LE edema noted.  Neuro: Grossly nonfocal.  Psych: Alert and oriented to person, place, and time. Appropriate mood and conversation.    Reviewed pulmonary note  LABS: Results reviewed and discussed with the patient, questions answered.      This dictation was created using voice recognition software. I have made reasonable attempts to correct errors, however, errors of grammar and content may exist.          Assessment/Plan:    1. Tobacco abuse  Long discussion held regarding implications of ongoing cigarette abuse.  Discussed behavior modification.  Discussed motivation/grandchildren.  Discussed patches and mouth habit options such as sunflower seeds.  Support    2. Chronic obstructive pulmonary disease with acute exacerbation (HCC)  Significantly he felt much better on antibiotics and has had recurrence with discontinuation.  Long discussion held regarding maximizing pulmonary toilet DuoNeb twice daily religiously and up to 4 times a day as needed shortness of breath or cough.  Empiric course of Ceftin trial to see if a different course of antibiotics may help with the pneumonia.  Follow-up x-ray and follow-up with pulmonary in 3-4 weeks as already scheduled.    3. Pneumonia due to infectious organism, unspecified laterality, unspecified part of lung  As above trial Ceftin empiric course of antibiotics after recent Levaquin course    4. Other " hyperlipidemia  Currently not taking statin unclear reasons focus on pulmonary for now    5.  Vitamin D deficiency  Supplementation discussed with the patient, osteoporosis risk reviewed with patient as he is a lifelong smoker.  Consideration for bone density he declines for now    Patient was seen for 25 minutes face to face of which more than 50% of the time was spent in counseling and coordination of care regarding the above problems.

## 2019-02-22 NOTE — ASSESSMENT & PLAN NOTE
Patient report he continues to try to stop smoking.  He is cut way back.  He did purchase the patches.

## 2019-03-12 ENCOUNTER — OFFICE VISIT (OUTPATIENT)
Dept: PULMONOLOGY | Facility: HOSPICE | Age: 69
End: 2019-03-12
Payer: MEDICARE

## 2019-03-12 ENCOUNTER — APPOINTMENT (OUTPATIENT)
Dept: RADIOLOGY | Facility: IMAGING CENTER | Age: 69
End: 2019-03-12
Attending: INTERNAL MEDICINE
Payer: MEDICARE

## 2019-03-12 VITALS
TEMPERATURE: 97.5 F | RESPIRATION RATE: 16 BRPM | SYSTOLIC BLOOD PRESSURE: 118 MMHG | BODY MASS INDEX: 15.77 KG/M2 | HEART RATE: 85 BPM | OXYGEN SATURATION: 96 % | HEIGHT: 73 IN | WEIGHT: 119 LBS | DIASTOLIC BLOOD PRESSURE: 70 MMHG

## 2019-03-12 DIAGNOSIS — Z87.01 HISTORY OF PNEUMONIA: ICD-10-CM

## 2019-03-12 DIAGNOSIS — Z23 NEED FOR VACCINATION: ICD-10-CM

## 2019-03-12 DIAGNOSIS — J18.9 PNEUMONIA OF LEFT LOWER LOBE DUE TO INFECTIOUS ORGANISM: ICD-10-CM

## 2019-03-12 DIAGNOSIS — J44.9 CHRONIC OBSTRUCTIVE PULMONARY DISEASE, UNSPECIFIED COPD TYPE (HCC): ICD-10-CM

## 2019-03-12 DIAGNOSIS — Z72.0 TOBACCO USE: ICD-10-CM

## 2019-03-12 DIAGNOSIS — R91.8 PULMONARY NODULES: ICD-10-CM

## 2019-03-12 PROCEDURE — G0009 ADMIN PNEUMOCOCCAL VACCINE: HCPCS | Performed by: INTERNAL MEDICINE

## 2019-03-12 PROCEDURE — 99214 OFFICE O/P EST MOD 30 MIN: CPT | Mod: 25 | Performed by: INTERNAL MEDICINE

## 2019-03-12 PROCEDURE — 90732 PPSV23 VACC 2 YRS+ SUBQ/IM: CPT | Performed by: INTERNAL MEDICINE

## 2019-03-12 PROCEDURE — 71046 X-RAY EXAM CHEST 2 VIEWS: CPT | Mod: TC,FY | Performed by: INTERNAL MEDICINE

## 2019-03-12 NOTE — PROGRESS NOTES
Chief Complaint   Patient presents with   • Results     Chest XR     HPI: This patient is a 68 y.o. Male who returns to lung nodule clinic.  He has been a lifelong smoker with 40 pack years to date, and continues to smoke. He has weaned to < 1/2 pack per day.  Low-dose lung cancer screening chest CAT scan on June 1, 2017 showed a 7 mm noncalcified left lower lobe nodule, 5 mm right lower lobe nodule, multiple bilateral <5 mm pulmonary nodules. Of incidental finding was emphysema and parenchymal scarring in the left lower lobe.  A follow-up chest CAT scan in January 2018 showed interval resolution of the 7 mm nodule, stable remaining nodules.  Over the past 2 months he has developed a productive cough, and was seen in urgent care on November 6, 2018 and prescribed doxycycline and prednisone, without subjective benefit.  Chest x-ray at that time showed left basilar opacity.  Follow-up chest CAT scan on January 3, 2019 was reviewed and shows left lower lobe infiltrate consistent with pneumonia, stable pulmonary nodules.  He was treated with Levaquin with initial good clinical response however decompensation of antibiotics.  He then completed a second round of cephalosporin provided by his PCP.  His sputum has cleared although he continues to have increased cough.  Follow-up chest x-ray today shows resolution of consolidation. He denies worsening dyspnea, fevers or chills.  He has rare albuterol rescue inhaler use.  He was prescribed Anoro however it was prohibitively expensive.      Past Medical History:   Diagnosis Date   • Cervical spine disease 3/30/2017   • Chronic obstructive pulmonary disease with acute exacerbation (HCC) 3/30/2017   • Dental caries 3/30/2017   • Pneumonia    • Tobacco abuse    • Tonsillitis        Social History     Social History   • Marital status: Single     Spouse name: N/A   • Number of children: N/A   • Years of education: N/A     Occupational History   • Not on file.     Social History  Main Topics   • Smoking status: Current Every Day Smoker     Packs/day: 0.50     Years: 50.00     Types: Cigarettes   • Smokeless tobacco: Never Used      Comment: Started smoking in 1968   • Alcohol use 8.4 - 12.6 oz/week     14 - 21 Cans of beer per week      Comment: Average per weekday 20 Average per weekend 6   • Drug use: No   • Sexual activity: No      Comment:  5 years ago     Other Topics Concern   •  Service No   • Blood Transfusions No   • Caffeine Concern No   • Occupational Exposure No   • Hobby Hazards No   • Sleep Concern No   • Stress Concern No   • Weight Concern Yes     lost weight    • Special Diet No   • Back Care No   • Exercise Yes   • Bike Helmet No     does not ride bike   • Seat Belt Yes   • Self-Exams No     Social History Narrative   • No narrative on file       Family History   Problem Relation Age of Onset   • Cancer Father         lung   • Other Mother         memory loss   • No Known Problems Brother        Current Outpatient Prescriptions on File Prior to Visit   Medication Sig Dispense Refill   • albuterol (VENTOLIN HFA) 108 (90 Base) MCG/ACT Aero Soln inhalation aerosol Inhale 2 Puffs by mouth every 6 hours as needed for Shortness of Breath. 3 Inhaler 3   • aspirin EC (ECOTRIN) 325 MG Tablet Delayed Response Take 1 Tab by mouth every day. 30 Tab 6   • cefUROXime (CEFTIN) 250 MG Tab Take 1 Tab by mouth 2 times a day. (Patient not taking: Reported on 3/12/2019) 20 Tab 0   • levoFLOXacin (LEVAQUIN) 500 MG tablet Take 1 Tab by mouth every day. (Patient not taking: Reported on 2/22/2019) 10 Tab 0   • predniSONE (DELTASONE) 20 MG Tab Take 2 tabs PO QD x 5 days. (Patient not taking: Reported on 2/22/2019) 10 Tab 0   • umeclidinium-vilanterol (ANORO ELLIPTA) 62.5-25 MCG/INH AEROSOL POWDER, BREATH ACTIVATED inhaler Inhale 1 Puff by mouth every day. (Patient not taking: Reported on 2/22/2019) 1 Inhaler 6   • albuterol 108 (90 Base) MCG/ACT Aero Soln inhalation aerosol Inhale 2  "Puffs by mouth every 6 hours as needed for Shortness of Breath. (Patient not taking: Reported on 11/16/2018) 8.5 g 0   • ipratropium-albuterol (DUONEB) 0.5-2.5 (3) MG/3ML nebulizer solution 3 mL by Nebulization route every 6 hours as needed for Shortness of Breath. (Patient not taking: Reported on 11/16/2018) 120 Vial 0   • lovastatin (MEVACOR) 20 MG Tab Take 1 Tab by mouth every day. (Patient not taking: Reported on 2/22/2019) 30 Tab 6     No current facility-administered medications on file prior to visit.        Allergies: Patient has no known allergies.    ROS:   Constitutional: Denies fevers, chills, night sweats, fatigue or weight loss  Eyes: Denies vision loss, pain, drainage, double vision  Ears, Nose, Throat: Denies earache, difficulty hearing, tinnitus, nasal congestion, hoarseness  Cardiovascular: Denies chest pain, tightness, palpitations, orthopnea or edema  Respiratory: As in HPI  Sleep: Denies daytime sleepiness, snoring, apneas, insomnia, morning headaches  GI: Denies heartburn, dysphagia, nausea, abdominal pain, diarrhea or constipation  : Denies frequent urination, hematuria, discharge or painful urination  Musculoskeletal: Denies back pain, painful joints, sore muscles  Neurological: Denies weakness or headaches  Skin: No rashes    Blood pressure 118/70, pulse 85, temperature 36.4 °C (97.5 °F), temperature source Temporal, resp. rate 16, height 1.854 m (6' 1\"), weight 54 kg (119 lb), SpO2 96 %.    Physical Exam:  Appearance: Well-nourished, well-developed, in no acute distress  HEENT: Normocephalic, atraumatic, white sclera, PERRLA, oropharynx clear  Neck: No adenopathy or masses  Respiratory: no intercostal retractions or accessory muscle use  Lungs auscultation: Clear to auscultation bilaterally, diminished breath sounds  Cardiovascular: Regular rate rhythm. No murmurs, rubs or gallops.  No LE edema  Abdomen: soft, nondistended  Gait: Normal  Digits: No clubbing, cyanosis  Motor: No focal " deficits  Orientation: Oriented to time, person and place    Diagnosis:  1. Pulmonary nodules  CT-CHEST (THORAX) W/O   2. Need for vaccination  Pneumococal Polysaccharide Vaccine 23-Valent =>1yo SQ/IM   3. Tobacco use     4. Chronic obstructive pulmonary disease, unspecified COPD type (HCC)     5. History of pneumonia         Plan:  The patient's pneumonia has resolved.  Pneumococcal vaccine provided.  Smoking cessation counseling provided.  Continue albuterol HFA versus nebulized every 4 hours as needed.  He was unable to fill his prescription for long-acting inhaled bronchodilators due to expense.  Follow-up chest CAT scan within 6-month, i.e. June 2019 for pulmonary nodules.  Return in about 3 months (around 6/12/2019) for at lung nodule clinic.

## 2019-03-12 NOTE — PATIENT INSTRUCTIONS
"Smoking Cessation, Tips for Success  If you are ready to quit smoking, congratulations! You have chosen to help yourself be healthier. Cigarettes bring nicotine, tar, carbon monoxide, and other irritants into your body. Your lungs, heart, and blood vessels will be able to work better without these poisons. There are many different ways to quit smoking. Nicotine gum, nicotine patches, a nicotine inhaler, or nicotine nasal spray can help with physical craving. Hypnosis, support groups, and medicines help break the habit of smoking.  WHAT THINGS CAN I DO TO MAKE QUITTING EASIER?   Here are some tips to help you quit for good:  · Pick a date when you will quit smoking completely. Tell all of your friends and family about your plan to quit on that date.  · Do not try to slowly cut down on the number of cigarettes you are smoking. Pick a quit date and quit smoking completely starting on that day.  · Throw away all cigarettes.    · Clean and remove all ashtrays from your home, work, and car.  · On a card, write down your reasons for quitting. Carry the card with you and read it when you get the urge to smoke.  · Cleanse your body of nicotine. Drink enough water and fluids to keep your urine clear or pale yellow. Do this after quitting to flush the nicotine from your body.  · Learn to predict your moods. Do not let a bad situation be your excuse to have a cigarette. Some situations in your life might tempt you into wanting a cigarette.  · Never have \"just one\" cigarette. It leads to wanting another and another. Remind yourself of your decision to quit.  · Change habits associated with smoking. If you smoked while driving or when feeling stressed, try other activities to replace smoking. Stand up when drinking your coffee. Brush your teeth after eating. Sit in a different chair when you read the paper. Avoid alcohol while trying to quit, and try to drink fewer caffeinated beverages. Alcohol and caffeine may urge you to " "smoke.  · Avoid foods and drinks that can trigger a desire to smoke, such as sugary or spicy foods and alcohol.  · Ask people who smoke not to smoke around you.  · Have something planned to do right after eating or having a cup of coffee. For example, plan to take a walk or exercise.  · Try a relaxation exercise to calm you down and decrease your stress. Remember, you may be tense and nervous for the first 2 weeks after you quit, but this will pass.  · Find new activities to keep your hands busy. Play with a pen, coin, or rubber band. Doodle or draw things on paper.  · Brush your teeth right after eating. This will help cut down on the craving for the taste of tobacco after meals. You can also try mouthwash.    · Use oral substitutes in place of cigarettes. Try using lemon drops, carrots, cinnamon sticks, or chewing gum. Keep them handy so they are available when you have the urge to smoke.  · When you have the urge to smoke, try deep breathing.  · Designate your home as a nonsmoking area.  · If you are a heavy smoker, ask your health care provider about a prescription for nicotine chewing gum. It can ease your withdrawal from nicotine.  · Reward yourself. Set aside the cigarette money you save and buy yourself something nice.  · Look for support from others. Join a support group or smoking cessation program. Ask someone at home or at work to help you with your plan to quit smoking.  · Always ask yourself, \"Do I need this cigarette or is this just a reflex?\" Tell yourself, \"Today, I choose not to smoke,\" or \"I do not want to smoke.\" You are reminding yourself of your decision to quit.  · Do not replace cigarette smoking with electronic cigarettes (commonly called e-cigarettes). The safety of e-cigarettes is unknown, and some may contain harmful chemicals.  · If you relapse, do not give up! Plan ahead and think about what you will do the next time you get the urge to smoke.  HOW WILL I FEEL WHEN I QUIT SMOKING?  You " may have symptoms of withdrawal because your body is used to nicotine (the addictive substance in cigarettes). You may crave cigarettes, be irritable, feel very hungry, cough often, get headaches, or have difficulty concentrating. The withdrawal symptoms are only temporary. They are strongest when you first quit but will go away within 10-14 days. When withdrawal symptoms occur, stay in control. Think about your reasons for quitting. Remind yourself that these are signs that your body is healing and getting used to being without cigarettes. Remember that withdrawal symptoms are easier to treat than the major diseases that smoking can cause.   Even after the withdrawal is over, expect periodic urges to smoke. However, these cravings are generally short lived and will go away whether you smoke or not. Do not smoke!  WHAT RESOURCES ARE AVAILABLE TO HELP ME QUIT SMOKING?  Your health care provider can direct you to community resources or hospitals for support, which may include:  · Group support.  · Education.  · Hypnosis.  · Therapy.     This information is not intended to replace advice given to you by your health care provider. Make sure you discuss any questions you have with your health care provider.     Document Released: 09/15/2005 Document Revised: 01/08/2016 Document Reviewed: 06/05/2014  [employer] Patient Education ©2016 [employer]

## 2019-05-22 ENCOUNTER — OFFICE VISIT (OUTPATIENT)
Dept: MEDICAL GROUP | Facility: PHYSICIAN GROUP | Age: 69
End: 2019-05-22
Payer: MEDICARE

## 2019-05-22 VITALS
DIASTOLIC BLOOD PRESSURE: 90 MMHG | OXYGEN SATURATION: 96 % | HEIGHT: 73 IN | SYSTOLIC BLOOD PRESSURE: 138 MMHG | RESPIRATION RATE: 14 BRPM | BODY MASS INDEX: 16.83 KG/M2 | WEIGHT: 127 LBS | HEART RATE: 82 BPM | TEMPERATURE: 98.9 F

## 2019-05-22 DIAGNOSIS — E55.9 VITAMIN D DEFICIENCY: ICD-10-CM

## 2019-05-22 DIAGNOSIS — Z72.0 TOBACCO ABUSE: ICD-10-CM

## 2019-05-22 DIAGNOSIS — J44.1 CHRONIC OBSTRUCTIVE PULMONARY DISEASE WITH ACUTE EXACERBATION (HCC): ICD-10-CM

## 2019-05-22 DIAGNOSIS — E78.49 OTHER HYPERLIPIDEMIA: ICD-10-CM

## 2019-05-22 PROCEDURE — 99214 OFFICE O/P EST MOD 30 MIN: CPT | Performed by: INTERNAL MEDICINE

## 2019-05-22 RX ORDER — IPRATROPIUM BROMIDE AND ALBUTEROL SULFATE 2.5; .5 MG/3ML; MG/3ML
3 SOLUTION RESPIRATORY (INHALATION) 4 TIMES DAILY
Qty: 90 BULLET | Refills: 3
Start: 2019-05-22 | End: 2023-10-30 | Stop reason: SDUPTHER

## 2019-05-22 ASSESSMENT — PATIENT HEALTH QUESTIONNAIRE - PHQ9: CLINICAL INTERPRETATION OF PHQ2 SCORE: 0

## 2019-05-22 NOTE — PROGRESS NOTES
Chief Complaint   Patient presents with   • Dizziness     dizziness after pt has been running around all day 2-3 months       HISTORY OF PRESENT ILLNESS: Patient is a 68 y.o. male established patient who presents today to discuss the medical issues below.    Chronic obstructive pulmonary disease with acute exacerbation (CMS-HCC)  Patient reports better after the course of Ceftin in March, now with cough, clear, he states hs is using the MDI inhaler bid at prn.  He is not using the nebulizer.  He not using the nebulizer as he travels a lot.  Denies dyspnea at rest, some with exertion.      Tobacco abuse  Continues at 10 cigarettes a day.     Other hyperlipidemia  States not taking katelin lovastatin.  He states he forgets to take it.  He states he remembers his aspirin but forgets the lovastatin.      Vitamin D deficiency  Not utilizing any supplements.        Patient Active Problem List    Diagnosis Date Noted   • Vitamin D deficiency 02/22/2019   • Other hyperlipidemia 10/19/2017   • Colon cancer screening 10/19/2017   • Chronic obstructive pulmonary disease with acute exacerbation (HCC) 03/30/2017   • Dental caries 03/30/2017   • Cervical spine disease 03/30/2017   • Cerebral microvascular disease 03/30/2017   • Tobacco abuse        Allergies:Patient has no known allergies.    Current Outpatient Prescriptions   Medication Sig Dispense Refill   • ipratropium-albuterol (DUONEB) 0.5-2.5 (3) MG/3ML nebulizer solution 3 mL by Nebulization route 4 times a day. 90 Bullet 3   • aspirin EC (ECOTRIN) 325 MG Tablet Delayed Response Take 1 Tab by mouth every day. 30 Tab 6   • albuterol (VENTOLIN HFA) 108 (90 Base) MCG/ACT Aero Soln inhalation aerosol Inhale 2 Puffs by mouth every 6 hours as needed for Shortness of Breath. 3 Inhaler 3   • lovastatin (MEVACOR) 20 MG Tab Take 1 Tab by mouth every day. (Patient not taking: Reported on 2/22/2019) 30 Tab 6     No current facility-administered medications for this visit.          Past  "Medical History:   Diagnosis Date   • Cervical spine disease 3/30/2017   • Chronic obstructive pulmonary disease with acute exacerbation (HCC) 3/30/2017   • Dental caries 3/30/2017   • Pneumonia    • Tobacco abuse    • Tonsillitis        Social History   Substance Use Topics   • Smoking status: Current Every Day Smoker     Packs/day: 0.50     Years: 50.00     Types: Cigarettes   • Smokeless tobacco: Never Used      Comment: Started smoking in    • Alcohol use 8.4 - 12.6 oz/week     14 - 21 Cans of beer per week      Comment: Average per weekday 20 Average per weekend 6       Family Status   Relation Status   • Fa    • Mo Alive   • Bro Alive     Family History   Problem Relation Age of Onset   • Cancer Father         lung   • Other Mother         memory loss   • No Known Problems Brother        ROS:    Respiratory: Negative for sputum production, shortness of breath or wheezing.    Cardiovascular: Negative for chest pain, palpitations, orthopnea, dyspnea with exertion or edema.   Gastrointestinal: Negative for GI upset, nausea, vomiting, abdominal pain, constipation or diarrhea.   Genitourinary: Negative for dysuria, urgency, hesitancy or frequency.       Exam:    /90 (BP Location: Left arm, Patient Position: Sitting, BP Cuff Size: Small adult)   Pulse 82   Temp 37.2 °C (98.9 °F) (Temporal)   Resp 14   Ht 1.854 m (6' 1\")   Wt 57.6 kg (127 lb)   SpO2 96%   General:  Well nourished, well developed male in NAD.  HENT: Normocephalic, bilateral TMs are intact, nasal and oral mucosa with no lesions,   Neck: Supple without bruit. Thyroid is not enlarged.  Pulmonary: Coarse breath sounds bilateral lower lungs clears with deep inspiration and cough.  Mildly prolonged expiration.  Cardiovascular: Regular rate and rhythm without murmur.   Abdomen: Normal bowel sounds soft and nontender no palpable liver spleen bladder mass.  Extremities: No LE edema noted.  Neuro: Grossly nonfocal.  Psych: Alert and " "oriented to person, place, and time. Appropriate mood and conversation.    Reviewed pulmonary consultation.    This dictation was created using voice recognition software. I have made reasonable attempts to correct errors, however, errors of grammar and content may exist.          Assessment/Plan:    1. Chronic obstructive pulmonary disease with acute exacerbation (HCC)  Marginal compliance with bronchodilators, long discussion held.  Encouraged use of the DuoNeb at least twice a day more frequently as needed.  He is relying primarily on Proventil MDI inhaler at 2-4 times a day.    2. Tobacco abuse  Long discussion held regarding behavior modification, consideration for use of patches    3. Other hyperlipidemia  Recommended statin for ongoing therapy.  He states he will \"try\" unable to identify exactly compliance issue problems.  Support.    4. Vitamin D deficiency  Not supplementing.  As above under #3.    5.  Cerebrovascular disease.  He does continue on aspirin a day.  Implications of cigarette smoking reviewed.     Patient was seen for 25 minutes face to face of which more than 50% of the time was spent in counseling and coordination of care regarding the above problems.    "

## 2019-05-22 NOTE — ASSESSMENT & PLAN NOTE
Patient reports better after the course of Ceftin in March, now with cough, clear, he states hs is using the MDI inhaler bid at prn.  He is not using the nebulizer.  He not using the nebulizer as he travels a lot.  Denies dyspnea at rest, some with exertion.

## 2019-05-22 NOTE — ASSESSMENT & PLAN NOTE
States not taking katelin lovastatin.  He states he forgets to take it.  He states he remembers his aspirin but forgets the lovastatin.

## 2019-05-31 ENCOUNTER — APPOINTMENT (OUTPATIENT)
Dept: PHYSICAL MEDICINE AND REHAB | Facility: MEDICAL CENTER | Age: 69
End: 2019-05-31
Payer: MEDICARE

## 2019-06-19 ENCOUNTER — HOSPITAL ENCOUNTER (OUTPATIENT)
Dept: RADIOLOGY | Facility: MEDICAL CENTER | Age: 69
End: 2019-06-19
Attending: INTERNAL MEDICINE
Payer: MEDICARE

## 2019-06-19 DIAGNOSIS — R91.8 PULMONARY NODULES: ICD-10-CM

## 2019-06-19 PROCEDURE — 71250 CT THORAX DX C-: CPT

## 2019-06-25 ENCOUNTER — OFFICE VISIT (OUTPATIENT)
Dept: PULMONOLOGY | Facility: HOSPICE | Age: 69
End: 2019-06-25
Payer: MEDICARE

## 2019-06-25 VITALS
HEART RATE: 87 BPM | OXYGEN SATURATION: 94 % | HEIGHT: 73 IN | SYSTOLIC BLOOD PRESSURE: 140 MMHG | WEIGHT: 126 LBS | BODY MASS INDEX: 16.7 KG/M2 | DIASTOLIC BLOOD PRESSURE: 52 MMHG | RESPIRATION RATE: 16 BRPM

## 2019-06-25 DIAGNOSIS — Z72.0 TOBACCO USE: ICD-10-CM

## 2019-06-25 DIAGNOSIS — R91.8 PULMONARY NODULES: ICD-10-CM

## 2019-06-25 DIAGNOSIS — J44.9 CHRONIC OBSTRUCTIVE PULMONARY DISEASE, UNSPECIFIED COPD TYPE (HCC): ICD-10-CM

## 2019-06-25 PROCEDURE — 99214 OFFICE O/P EST MOD 30 MIN: CPT | Performed by: INTERNAL MEDICINE

## 2019-06-25 RX ORDER — ALBUTEROL SULFATE 90 UG/1
2 AEROSOL, METERED RESPIRATORY (INHALATION) EVERY 6 HOURS PRN
Qty: 3 INHALER | Refills: 3 | Status: SHIPPED | OUTPATIENT
Start: 2019-06-25 | End: 2019-08-26 | Stop reason: SDUPTHER

## 2019-06-25 NOTE — PATIENT INSTRUCTIONS
Tobacco Use Disorder  Tobacco use disorder (TUD) is a mental disorder. It is the long-term use of tobacco in spite of related health problems or difficulty with normal life activities. Tobacco is most commonly smoked as cigarettes and less commonly as cigars or pipes. Smokeless chewing tobacco and snuff are also popular. People with TUD get a feeling of extreme pleasure (euphoria) from using tobacco and have a desire to use it again and again. Repeated use of tobacco can cause problems.  The addictive effects of tobacco are due mainly to the ingredient nicotine. Nicotine also causes a rush of adrenaline (epinephrine) in the body. This leads to increased blood pressure, heart rate, and breathing rate. These changes may cause problems for people with high blood pressure, weak hearts, or lung disease. High doses of nicotine in children and pets can lead to seizures and death.  Tobacco contains a number of other unsafe chemicals. These chemicals are especially harmful when inhaled as smoke and can damage almost every organ in the body. Smokers live shorter lives than nonsmokers and are at risk of dying from a number of diseases and cancers. Tobacco smoke can also cause health problems for nonsmokers (due to inhaling secondhand smoke). Smoking is also a fire hazard.  TUD usually starts in the late teenage years and is most common in young adults between the ages of 18 and 25 years. People who start smoking earlier in life are more likely to continue smoking as adults. TUD is somewhat more common in men than women. People with TUD are at higher risk for using alcohol and other drugs of abuse.  What increases the risk?  Risk factors for TUD include:  · Having family members with the disorder.  · Being around people who use tobacco.  · Having an existing mental health issue such as schizophrenia, depression, bipolar disorder, ADHD, or posttraumatic stress disorder (PTSD).  What are the signs or symptoms?  People with  tobacco use disorder have two or more of the following signs and symptoms within 12 months:  · Use of more tobacco over a longer period than intended.  · Not able to cut down or control tobacco use.  · A lot of time spent obtaining or using tobacco.  · Strong desire or urge to use tobacco (craving). Cravings may last for 6 months or longer after quitting.  · Use of tobacco even when use leads to major problems at work, school, or home.  · Use of tobacco even when use leads to relationship problems.  · Giving up or cutting down on important life activities because of tobacco use.  · Repeatedly using tobacco in situations where it puts you or others in physical danger, like smoking in bed.  · Use of tobacco even when it is known that a physical or mental problem is likely related to tobacco use.  ¨ Physical problems are numerous and may include chronic bronchitis, emphysema, lung and other cancers, gum disease, high blood pressure, heart disease, and stroke.  ¨ Mental problems caused by tobacco may include difficulty sleeping and anxiety.  · Need to use greater amounts of tobacco to get the same effect. This means you have developed a tolerance.  · Withdrawal symptoms as a result of stopping or rapidly cutting back use. These symptoms may last a month or more after quitting and include the following:  ¨ Depressed, anxious, or irritable mood.  ¨ Difficulty concentrating.  ¨ Increased appetite.  ¨ Restlessness or trouble sleeping.  ¨ Use of tobacco to avoid withdrawal symptoms.  How is this diagnosed?  Tobacco use disorder is diagnosed by your health care provider. A diagnosis may be made by:  · Your health care provider asking questions about your tobacco use and any problems it may be causing.  · A physical exam.  · Lab tests.  · You may be referred to a mental health professional or addiction specialist.  The severity of tobacco use disorder depends on the number of signs and symptoms you have:  · Mild--Two or three  symptoms.  · Moderate--Four or five symptoms.  · Severe--Six or more symptoms.  How is this treated?  Many people with tobacco use disorder are unable to quit on their own and need help. Treatment options include the following:  · Nicotine replacement therapy (NRT). NRT provides nicotine without the other harmful chemicals in tobacco. NRT gradually lowers the dosage of nicotine in the body and reduces withdrawal symptoms. NRT is available in over-the-counter forms (gum, lozenges, and skin patches) as well as prescription forms (mouth inhaler and nasal spray).  · Medicines. This may include:  ¨ Antidepressant medicine that may reduce nicotine cravings.  ¨ A medicine that acts on nicotine receptors in the brain to reduce cravings and withdrawal symptoms. It may also block the effects of tobacco in people with TUD who relapse.  · Counseling or talk therapy. A form of talk therapy called behavioral therapy is commonly used to treat people with TUD. Behavioral therapy looks at triggers for tobacco use, how to avoid them, and how to cope with cravings. It is most effective in person or by phone but is also available in self-help forms (books and Internet websites).  · Support groups. These provide emotional support, advice, and guidance for quitting tobacco.  The most effective treatment for TUD is usually a combination of medicine, talk therapy, and support groups.  Follow these instructions at home:  · Keep all follow-up visits as directed by your health care provider. This is important.  · Take medicines only as directed by your health care provider.  · Check with your health care provider before starting new prescription or over-the-counter medicines.  Contact a health care provider if:  · You are not able to take your medicines as prescribed.  · Treatment is not helping your TUD and your symptoms get worse.  Get help right away if:  · You have serious thoughts about hurting yourself or others.  · You have trouble  breathing, chest pain, sudden weakness, or sudden numbness in part of your body.  This information is not intended to replace advice given to you by your health care provider. Make sure you discuss any questions you have with your health care provider.  Document Released: 08/23/2005 Document Revised: 08/20/2017 Document Reviewed: 02/13/2015  [employer] Patient Education © 2017 [employer]

## 2019-06-25 NOTE — PROGRESS NOTES
Chief Complaint   Patient presents with   • Follow-Up     Pulmonary nodules   • Results     CT Re       HPI: This patient is a 68 y.o. Male who returns to lung nodule clinic.  He has been a lifelong smoker with 40 pack years to date, and continues to smoke. He has weaned to < 1/2 pack per day.  He states nicotine replacement and Chantix have not worked.  Low-dose lung cancer screening chest CAT scan on June 1, 2017 showed a 7 mm noncalcified left lower lobe nodule, 5 mm right lower lobe nodule, multiple bilateral <5 mm pulmonary nodules. Of incidental finding was emphysema and parenchymal scarring in the left lower lobe.  A follow-up chest CAT scan in January 2018 showed interval resolution of the 7 mm nodule, stable remaining nodules.  Chest CAT scan on January 3, 2019 showed left lower lobe infiltrate consistent with pneumonia, stable pulmonary nodules.  He was treated with Levaquin with initial good clinical response however decompensation of antibiotics.  He then completed a second round of cephalosporin provided by his PCP.  Follow-up chest x-ray showed resolution of consolidation.  Chest CAT scan from June 19, 2019 showed stable bilateral nodules and emphysema.  He feels back to his baseline.  He has chronic productive cough and exertional dyspnea which has been stable.  He was prescribed long-acting inhalers such as Anoro however could not afford them and has been using albuterol HFA prn only. Denies AECOPD since last visit.    Past Medical History:   Diagnosis Date   • Cervical spine disease 3/30/2017   • Chronic obstructive pulmonary disease with acute exacerbation (HCC) 3/30/2017   • Dental caries 3/30/2017   • Pneumonia    • Tobacco abuse    • Tonsillitis        Social History     Social History   • Marital status: Single     Spouse name: N/A   • Number of children: N/A   • Years of education: N/A     Occupational History   • Not on file.     Social History Main Topics   • Smoking status: Current Every  Day Smoker     Packs/day: 0.50     Years: 50.00     Types: Cigarettes   • Smokeless tobacco: Never Used      Comment: Started smoking in 1968   • Alcohol use 8.4 - 12.6 oz/week     14 - 21 Cans of beer per week      Comment: Average per weekday 20 Average per weekend 6   • Drug use: No   • Sexual activity: No      Comment:  5 years ago     Other Topics Concern   •  Service No   • Blood Transfusions No   • Caffeine Concern No   • Occupational Exposure No   • Hobby Hazards No   • Sleep Concern No   • Stress Concern No   • Weight Concern Yes     lost weight    • Special Diet No   • Back Care No   • Exercise Yes   • Bike Helmet No     does not ride bike   • Seat Belt Yes   • Self-Exams No     Social History Narrative   • No narrative on file       Family History   Problem Relation Age of Onset   • Cancer Father         lung   • Other Mother         memory loss   • No Known Problems Brother        Current Outpatient Prescriptions on File Prior to Visit   Medication Sig Dispense Refill   • ipratropium-albuterol (DUONEB) 0.5-2.5 (3) MG/3ML nebulizer solution 3 mL by Nebulization route 4 times a day. 90 Bullet 3   • aspirin EC (ECOTRIN) 325 MG Tablet Delayed Response Take 1 Tab by mouth every day. 30 Tab 6   • lovastatin (MEVACOR) 20 MG Tab Take 1 Tab by mouth every day. (Patient not taking: Reported on 2/22/2019) 30 Tab 6     No current facility-administered medications on file prior to visit.        Allergies: Patient has no known allergies.    ROS:   Constitutional: Denies fevers, chills, night sweats, fatigue or weight loss  Eyes: Denies vision loss, pain, drainage, double vision  Ears, Nose, Throat: Denies earache, difficulty hearing, tinnitus, nasal congestion, hoarseness  Cardiovascular: Denies chest pain, tightness, palpitations, orthopnea or edema  Respiratory: As in HPI  Sleep: Denies daytime sleepiness, snoring, apneas, insomnia, morning headaches  GI: Denies heartburn, dysphagia, nausea, abdominal  "pain, diarrhea or constipation  : Denies frequent urination, hematuria, discharge or painful urination  Musculoskeletal: Denies back pain, painful joints, sore muscles  Neurological: Denies weakness or headaches  Skin: No rashes    /52 (BP Location: Left arm, Patient Position: Sitting, BP Cuff Size: Adult)   Pulse 87   Resp 16   Ht 1.854 m (6' 1\")   Wt 57.2 kg (126 lb)   SpO2 94%     Physical Exam:  Appearance: Well-nourished, well-developed, in no acute distress  HEENT: Normocephalic, atraumatic, white sclera, PERRLA, oropharynx clear  Neck: No adenopathy or masses  Respiratory: no intercostal retractions or accessory muscle use  Lungs auscultation: Clear to auscultation bilaterally, diminished breath sounds  Cardiovascular: Regular rate rhythm. No murmurs, rubs or gallops.  No LE edema  Abdomen: soft, nondistended  Gait: Normal  Digits: No clubbing, cyanosis  Motor: No focal deficits  Orientation: Oriented to time, person and place    Diagnosis:  1. Pulmonary nodules  CT-CHEST (THORAX) W/O   2. Chronic obstructive pulmonary disease, unspecified COPD type (HCC)  albuterol (VENTOLIN HFA) 108 (90 Base) MCG/ACT Aero Soln inhalation aerosol   3. Tobacco use         Plan:  The pulmonary nodules have been stable since June 2017, suggest benign etiology.  Recommend annual screening chest CAT scan for follow-up.  Smoking cessation counseling provided.  He has been weaning his smoking significantly.  He declines long-acting inhaled bronchodilators due to expense.  Continue albuterol HFA as needed.  Return in about 1 year (around 6/25/2020) for at lung nodule clinic.      "

## 2019-07-30 ENCOUNTER — OFFICE VISIT (OUTPATIENT)
Dept: URGENT CARE | Facility: PHYSICIAN GROUP | Age: 69
End: 2019-07-30
Payer: MEDICARE

## 2019-07-30 ENCOUNTER — APPOINTMENT (OUTPATIENT)
Dept: URGENT CARE | Facility: PHYSICIAN GROUP | Age: 69
End: 2019-07-30
Payer: MEDICARE

## 2019-07-30 ENCOUNTER — APPOINTMENT (OUTPATIENT)
Dept: RADIOLOGY | Facility: IMAGING CENTER | Age: 69
End: 2019-07-30
Attending: NURSE PRACTITIONER
Payer: MEDICARE

## 2019-07-30 ENCOUNTER — TELEPHONE (OUTPATIENT)
Dept: URGENT CARE | Facility: PHYSICIAN GROUP | Age: 69
End: 2019-07-30

## 2019-07-30 VITALS
SYSTOLIC BLOOD PRESSURE: 132 MMHG | BODY MASS INDEX: 16.7 KG/M2 | HEIGHT: 73 IN | RESPIRATION RATE: 16 BRPM | OXYGEN SATURATION: 90 % | WEIGHT: 126 LBS | TEMPERATURE: 99.6 F | DIASTOLIC BLOOD PRESSURE: 88 MMHG | HEART RATE: 104 BPM

## 2019-07-30 DIAGNOSIS — Z87.01 H/O: PNEUMONIA: ICD-10-CM

## 2019-07-30 DIAGNOSIS — R05.9 COUGH: ICD-10-CM

## 2019-07-30 DIAGNOSIS — R06.2 WHEEZE: ICD-10-CM

## 2019-07-30 DIAGNOSIS — R50.9 FEVER, UNSPECIFIED FEVER CAUSE: ICD-10-CM

## 2019-07-30 DIAGNOSIS — R05.8 PRODUCTIVE COUGH: ICD-10-CM

## 2019-07-30 DIAGNOSIS — J44.0 CHRONIC OBSTRUCTIVE PULMONARY DISEASE WITH ACUTE LOWER RESPIRATORY INFECTION (HCC): ICD-10-CM

## 2019-07-30 PROCEDURE — 99214 OFFICE O/P EST MOD 30 MIN: CPT | Mod: 25 | Performed by: NURSE PRACTITIONER

## 2019-07-30 PROCEDURE — 71046 X-RAY EXAM CHEST 2 VIEWS: CPT | Mod: TC,FY | Performed by: PHYSICIAN ASSISTANT

## 2019-07-30 PROCEDURE — 94640 AIRWAY INHALATION TREATMENT: CPT | Performed by: NURSE PRACTITIONER

## 2019-07-30 RX ORDER — METHYLPREDNISOLONE 4 MG/1
TABLET ORAL
Qty: 1 KIT | Refills: 0 | Status: SHIPPED | OUTPATIENT
Start: 2019-07-30 | End: 2019-11-06

## 2019-07-30 RX ORDER — IPRATROPIUM BROMIDE AND ALBUTEROL SULFATE 2.5; .5 MG/3ML; MG/3ML
3 SOLUTION RESPIRATORY (INHALATION) ONCE
Status: COMPLETED | OUTPATIENT
Start: 2019-07-30 | End: 2019-07-30

## 2019-07-30 RX ORDER — DOXYCYCLINE HYCLATE 100 MG/1
100 CAPSULE ORAL 2 TIMES DAILY
Qty: 20 EACH | Refills: 0 | Status: SHIPPED | OUTPATIENT
Start: 2019-07-30 | End: 2019-08-09

## 2019-07-30 RX ADMIN — IPRATROPIUM BROMIDE AND ALBUTEROL SULFATE 3 ML: 2.5; .5 SOLUTION RESPIRATORY (INHALATION) at 11:17

## 2019-07-30 ASSESSMENT — ENCOUNTER SYMPTOMS
PALPITATIONS: 0
SENSORY CHANGE: 0
HEADACHES: 0
WEAKNESS: 0
MYALGIAS: 0
BACK PAIN: 0
TINGLING: 0
VOMITING: 0
ABDOMINAL PAIN: 0
SINUS PAIN: 0
SHORTNESS OF BREATH: 1
NAUSEA: 0
SPUTUM PRODUCTION: 1
WHEEZING: 1
SORE THROAT: 0
ORTHOPNEA: 0
CHILLS: 0
DIZZINESS: 0
FEVER: 0
COUGH: 1

## 2019-07-30 NOTE — PROGRESS NOTES
"Subjective:      Kobe Morris is a 68 y.o. male who presents with Cough (X 2 weeks)                   C/o yellow phlegm, cough productive x 3 weeks. Denies fever, nasal congestion or sore throat. Ventolin use \"every hour\" due to inability to breathe well especially at night. Has to prop self up. Taking Robitussin cough syrup, Halls cough drops. Has used nebulizer 3-4x/day. Smoker. H/o multiple lung nodules. Last saw pulmonology on 6/25/19. Due to see PCP on 8/6/19.    PMH:  has a past medical history of Cervical spine disease (3/30/2017); Chronic obstructive pulmonary disease with acute exacerbation (HCC) (3/30/2017); Dental caries (3/30/2017); Pneumonia; Tobacco abuse; and Tonsillitis. He also has no past medical history of Diabetes; Encounter for long-term (current) use of other medications; or Hypertension.  MEDS:   Current Outpatient Prescriptions:   •  albuterol (VENTOLIN HFA) 108 (90 Base) MCG/ACT Aero Soln inhalation aerosol, Inhale 2 Puffs by mouth every 6 hours as needed for Shortness of Breath., Disp: 3 Inhaler, Rfl: 3  •  ipratropium-albuterol (DUONEB) 0.5-2.5 (3) MG/3ML nebulizer solution, 3 mL by Nebulization route 4 times a day., Disp: 90 Bullet, Rfl: 3  •  lovastatin (MEVACOR) 20 MG Tab, Take 1 Tab by mouth every day., Disp: 30 Tab, Rfl: 6  •  aspirin EC (ECOTRIN) 325 MG Tablet Delayed Response, Take 1 Tab by mouth every day., Disp: 30 Tab, Rfl: 6  ALLERGIES: No Known Allergies  SURGHX: No past surgical history on file.  SOCHX:  reports that he has been smoking Cigarettes.  He has a 25.00 pack-year smoking history. He has never used smokeless tobacco. He reports that he drinks about 8.4 - 12.6 oz of alcohol per week . He reports that he does not use drugs.  FH: Family history was reviewed, no pertinent findings to report    Review of Systems   Constitutional: Negative for chills, fever and malaise/fatigue.   HENT: Negative for congestion, ear pain, sinus pain and sore throat.    Respiratory: " "Positive for cough, sputum production, shortness of breath and wheezing.    Cardiovascular: Negative for chest pain, palpitations and orthopnea.   Gastrointestinal: Negative for abdominal pain, nausea and vomiting.   Musculoskeletal: Negative for back pain and myalgias.   Skin: Negative for itching and rash.   Neurological: Negative for dizziness, tingling, sensory change, weakness and headaches.   Endo/Heme/Allergies: Negative for environmental allergies.   All other systems reviewed and are negative.         Objective:     /88 (BP Location: Right arm, Patient Position: Sitting, BP Cuff Size: Small adult)   Pulse (!) 104   Temp 37.6 °C (99.6 °F) (Temporal)   Resp 16   Ht 1.854 m (6' 1\")   Wt 57.2 kg (126 lb)   SpO2 90%   BMI 16.62 kg/m²      Physical Exam   Constitutional: He is oriented to person, place, and time. He appears well-developed and well-nourished. He is active and cooperative.  Non-toxic appearance. He does not have a sickly appearance. He does not appear ill. No distress.   HENT:   Head: Normocephalic.   Right Ear: Tympanic membrane, external ear and ear canal normal.   Left Ear: Tympanic membrane, external ear and ear canal normal.   Nose: No mucosal edema, rhinorrhea or sinus tenderness.   Mouth/Throat: Uvula is midline, oropharynx is clear and moist and mucous membranes are normal. No uvula swelling. No posterior oropharyngeal edema or posterior oropharyngeal erythema.   Eyes: Pupils are equal, round, and reactive to light. Conjunctivae and EOM are normal. Right eye exhibits no discharge. Left eye exhibits no discharge.   Neck: Normal range of motion.   Cardiovascular: Normal rate and regular rhythm.    Pulmonary/Chest: Effort normal. No accessory muscle usage. No respiratory distress. He has no decreased breath sounds. He has wheezes. He has rhonchi. He has no rales.   Musculoskeletal: Normal range of motion.   Lymphadenopathy:     He has no cervical adenopathy.   Neurological: He is " alert and oriented to person, place, and time.   Skin: Skin is warm and dry. He is not diaphoretic.     CXR:    FINDINGS:  The mediastinal and cardiac silhouette is unremarkable.  The pulmonary vascularity is within normal limits.  The lung parenchyma is clear.  There is no significant pleural effusion.  There is no visible pneumothorax.  There are no acute bony abnormalities.       Assessment/Plan:     1. Productive cough    - DX-CHEST-2 VIEWS; Future    2. Wheeze    - DX-CHEST-2 VIEWS; Future  - methylPREDNISolone (MEDROL DOSEPAK) 4 MG Tablet Therapy Pack; Use as directed  Dispense: 1 Kit; Refill: 0  - ipratropium-albuterol (DUONEB) nebulizer solution; 3 mL by Nebulization route Once.    3. Fever, unspecified fever cause    - DX-CHEST-2 VIEWS; Future    4. H/O: pneumonia    - DX-CHEST-2 VIEWS; Future    5. Chronic obstructive pulmonary disease with acute lower respiratory infection (HCC)    - doxycycline (VIBRAMYCIN) 100 MG Cap; Take 1 Cap by mouth 2 times a day for 10 days.  Dispense: 20 Each; Refill: 0  - methylPREDNISolone (MEDROL DOSEPAK) 4 MG Tablet Therapy Pack; Use as directed  Dispense: 1 Kit; Refill: 0  - ipratropium-albuterol (DUONEB) nebulizer solution; 3 mL by Nebulization route Once.    Increase water intake  May use Ibuprofen/Tylenol prn for fever or body aches  Get rest  Mucinex prn for productive cough   Use inhaler/nebulizer prn for SOB with cough  May use OTC cough suppressant medications like Robitussin/Delsym prn  Monitor for fevers, productive cough, SOB, CP, chest tightness- need re-evaluation    Call mobile for CXR results

## 2019-08-26 ENCOUNTER — OFFICE VISIT (OUTPATIENT)
Dept: MEDICAL GROUP | Facility: PHYSICIAN GROUP | Age: 69
End: 2019-08-26
Payer: MEDICARE

## 2019-08-26 VITALS
OXYGEN SATURATION: 95 % | WEIGHT: 122 LBS | DIASTOLIC BLOOD PRESSURE: 80 MMHG | HEART RATE: 86 BPM | SYSTOLIC BLOOD PRESSURE: 110 MMHG | TEMPERATURE: 99 F | RESPIRATION RATE: 14 BRPM | BODY MASS INDEX: 16.17 KG/M2 | HEIGHT: 73 IN

## 2019-08-26 DIAGNOSIS — F41.9 ANXIETY: ICD-10-CM

## 2019-08-26 DIAGNOSIS — K02.9 DENTAL CARIES: ICD-10-CM

## 2019-08-26 DIAGNOSIS — Z72.0 TOBACCO ABUSE: ICD-10-CM

## 2019-08-26 DIAGNOSIS — J44.9 CHRONIC OBSTRUCTIVE PULMONARY DISEASE, UNSPECIFIED COPD TYPE (HCC): ICD-10-CM

## 2019-08-26 DIAGNOSIS — Z12.11 COLON CANCER SCREENING: ICD-10-CM

## 2019-08-26 DIAGNOSIS — R63.4 WEIGHT LOSS: ICD-10-CM

## 2019-08-26 DIAGNOSIS — J44.1 CHRONIC OBSTRUCTIVE PULMONARY DISEASE WITH ACUTE EXACERBATION (HCC): ICD-10-CM

## 2019-08-26 PROCEDURE — 99214 OFFICE O/P EST MOD 30 MIN: CPT | Performed by: INTERNAL MEDICINE

## 2019-08-26 RX ORDER — ALBUTEROL SULFATE 90 UG/1
2 AEROSOL, METERED RESPIRATORY (INHALATION) EVERY 6 HOURS PRN
Qty: 3 INHALER | Refills: 3 | Status: SHIPPED | OUTPATIENT
Start: 2019-08-26 | End: 2020-02-10 | Stop reason: SDUPTHER

## 2019-08-26 SDOH — HEALTH STABILITY: MENTAL HEALTH: HOW OFTEN DO YOU HAVE A DRINK CONTAINING ALCOHOL?: 4 OR MORE TIMES A WEEK

## 2019-08-26 NOTE — PATIENT INSTRUCTIONS
Add pulmicort inhaler 2 puffs 2 x a day    Work on protein snacks: peanuts, string cheese    Continue to work on not smoking    Consider colonoscopy, this is highly recommended by me    Get into a Dentist for your teeth.

## 2019-08-26 NOTE — ASSESSMENT & PLAN NOTE
Patient reports he enjoys working all the time, he doesn't like watching movies, sitting and doing nothing.  He likes to be working and states this is forever.  He does ok if family is swimming or on the porch.  He still needs to get up and do something.

## 2019-08-26 NOTE — ASSESSMENT & PLAN NOTE
"Patient reports he is not eating well, tends to go right to work and then forgets to eat.  He states he simply has too much to do.  He does feel he gets dizzy still and is able to identify that hit is worse if he has not eatten \"for a few days\".  Dizzy only with activity.  Fleeting, improves with sitting.  Does not occur at nite or at rest, no associated chest pain palpitation edema.    "

## 2019-08-26 NOTE — PROGRESS NOTES
"  Chief Complaint   Patient presents with   • Medication Refill     ventolin    • Dizziness     dizzy spells x 2 months        HISTORY OF PRESENT ILLNESS: Patient is a 68 y.o. male established patient who presents today to discuss the medical issues below.    Weight loss  Patient reports he is not eating well, tends to go right to work and then forgets to eat.  He states he simply has too much to do.  He does feel he gets dizzy still and is able to identify that hit is worse if he has not eatten \"for a few days\".  Dizzy only with activity.  Fleeting, improves with sitting.  Does not occur at nite or at rest, no associated chest pain palpitation edema.  Patient believes the dizzy spells were better with the recent course of steroids.     Chronic obstructive pulmonary disease with acute exacerbation (CMS-HCC)  Patient continues with pulmonary, he is using the duoneb 2-3 x a day.      Anxiety  Patient reports he enjoys working all the time, he doesn't like watching movies, sitting and doing nothing.  He likes to be working and states this is forever.  He does ok if family is swimming or on the porch.  He still needs to get up and do something.      Dental caries  Dental work still pending.      Tobacco abuse  Currently at 5 cigarettes a day.  Down form the 2 ppd       Patient Active Problem List    Diagnosis Date Noted   • Weight loss 08/26/2019   • Anxiety 08/26/2019   • Vitamin D deficiency 02/22/2019   • Other hyperlipidemia 10/19/2017   • Colon cancer screening 10/19/2017   • Chronic obstructive pulmonary disease with acute exacerbation (HCC) 03/30/2017   • Dental caries 03/30/2017   • Cervical spine disease 03/30/2017   • Cerebral microvascular disease 03/30/2017   • Tobacco abuse        Allergies:Patient has no known allergies.    Current Outpatient Medications   Medication Sig Dispense Refill   • albuterol (VENTOLIN HFA) 108 (90 Base) MCG/ACT Aero Soln inhalation aerosol Inhale 2 Puffs by mouth every 6 hours as " needed for Shortness of Breath. 3 Inhaler 3   • ipratropium-albuterol (DUONEB) 0.5-2.5 (3) MG/3ML nebulizer solution 3 mL by Nebulization route 4 times a day. 90 Bullet 3   • aspirin EC (ECOTRIN) 325 MG Tablet Delayed Response Take 1 Tab by mouth every day. 30 Tab 6   • methylPREDNISolone (MEDROL DOSEPAK) 4 MG Tablet Therapy Pack Use as directed (Patient not taking: Reported on 2019) 1 Kit 0   • lovastatin (MEVACOR) 20 MG Tab Take 1 Tab by mouth every day. 30 Tab 6     No current facility-administered medications for this visit.          Past Medical History:   Diagnosis Date   • Cervical spine disease 3/30/2017   • Chronic obstructive pulmonary disease with acute exacerbation (HCC) 3/30/2017   • Dental caries 3/30/2017   • Pneumonia    • Tobacco abuse    • Tonsillitis        Social History     Tobacco Use   • Smoking status: Current Every Day Smoker     Packs/day: 0.50     Years: 50.00     Pack years: 25.00     Types: Cigarettes   • Smokeless tobacco: Never Used   • Tobacco comment: Started smoking in    Substance Use Topics   • Alcohol use: Yes     Alcohol/week: 8.4 - 12.6 oz     Types: 14 - 21 Cans of beer per week     Frequency: 4 or more times a week     Comment: Average per weekday 20 Average per weekend 6   • Drug use: No       Family Status   Relation Name Status   • Fa 52    • Mo 89 Alive   • Bro  Alive     Family History   Problem Relation Age of Onset   • Cancer Father         lung   • Other Mother         memory loss   • No Known Problems Brother        ROS:    Respiratory: Patient reports his breathing is at baseline.  He does become dyspneic with exertion.  Denies nocturnal dyspnea.  Not utilizing oxygen at night.  Cardiovascular: Negative for chest pain, palpitations, orthopnea, dyspnea with exertion or edema.   Gastrointestinal: Negative for GI upset, nausea, vomiting, abdominal pain, constipation or diarrhea.   Genitourinary: Negative for dysuria, urgency, hesitancy or frequency.  "      Exam:    /80 (BP Location: Right arm, Patient Position: Sitting, BP Cuff Size: Small adult)   Pulse 86   Temp 37.2 °C (99 °F) (Temporal)   Resp 14   Ht 1.854 m (6' 1\")   Wt 55.3 kg (122 lb)   SpO2 95%   General:  Well nourished, well developed male in NAD.  HENT: Nasal mucosa with minimal edema no sinus tenderness to percussion bilateral TMs are intact  Pulmonary: Vesicular air motion no rhonchi rales wheezes minimally prolonged expiration normal effort. No rales, rhonchi, or wheezing.  Cardiovascular: Regular rate and rhythm without murmur.   Abdomen: Normal bowel sounds soft and nontender no palpable liver spleen bladder mass.  Extremities: No LE edema noted.  Neuro: Grossly nonfocal.  Psych: Alert and oriented to person, place, and time. Appropriate mood and conversation.        This dictation was created using voice recognition software. I have made reasonable attempts to correct errors, however, errors of grammar and content may exist.          Assessment/Plan:    1. Chronic obstructive pulmonary disease, unspecified COPD type (HCC)  Patient is in [place] but poor historian.  It appears that these episodes of dizziness to occur at activity.  Suggesting potential episodes of hypoxia as he improved so substantially after recent urgent care and steroids.  Continue to attempt to maximize pulmonary status add Pulmicort to nebulizer DuoNeb monitor    2. Weight loss  Patient has insight that he simply forgets to eat.  Cannot entirely exclude a component of depression versus anxiety.  Discussed protein snacks throughout the day monitor with steroids and increased p.o. intake.  Recommend colon cancer screening.  He is reticent stating he simply does not have time.  Encouraged order written    3. Chronic obstructive pulmonary disease with acute exacerbation (HCC)  As discussed above    4. Anxiety  Some characteristics of an anxiety disorder.  He simply cannot walk away from work.  Continues to decline " consideration for medications.  Utilizing cigarettes as a distraction from stress support monitor.  Patient stating he cannot get the dental work done or the colonoscopy done secondary to being too busy.  Monitor support for underlying anxiety.    5. Dental caries  May contribute to weight loss and dizziness discussed recommended again get this addressed    6. Tobacco abuse  As discussed under #4 above, continue support he is cut way back.       Patient was seen for 25 minutes face to face of which more than 50% of the time was spent in counseling and coordination of care regarding the above problems.

## 2019-11-06 ENCOUNTER — OFFICE VISIT (OUTPATIENT)
Dept: MEDICAL GROUP | Facility: PHYSICIAN GROUP | Age: 69
End: 2019-11-06
Payer: MEDICARE

## 2019-11-06 VITALS
HEIGHT: 73 IN | BODY MASS INDEX: 16.3 KG/M2 | TEMPERATURE: 99 F | OXYGEN SATURATION: 92 % | RESPIRATION RATE: 16 BRPM | WEIGHT: 123 LBS | HEART RATE: 82 BPM | SYSTOLIC BLOOD PRESSURE: 120 MMHG | DIASTOLIC BLOOD PRESSURE: 78 MMHG

## 2019-11-06 DIAGNOSIS — Z66 DNR (DO NOT RESUSCITATE): ICD-10-CM

## 2019-11-06 DIAGNOSIS — J44.1 CHRONIC OBSTRUCTIVE PULMONARY DISEASE WITH ACUTE EXACERBATION (HCC): ICD-10-CM

## 2019-11-06 PROCEDURE — 99214 OFFICE O/P EST MOD 30 MIN: CPT | Performed by: INTERNAL MEDICINE

## 2019-11-06 RX ORDER — AMOXICILLIN AND CLAVULANATE POTASSIUM 875; 125 MG/1; MG/1
1 TABLET, FILM COATED ORAL 2 TIMES DAILY
Qty: 20 TAB | Refills: 1 | Status: SHIPPED | OUTPATIENT
Start: 2019-11-06 | End: 2020-02-10

## 2019-11-06 RX ORDER — PREDNISONE 10 MG/1
TABLET ORAL
Qty: 40 TAB | Refills: 1 | Status: SHIPPED | OUTPATIENT
Start: 2019-11-06 | End: 2019-11-27 | Stop reason: SDUPTHER

## 2019-11-06 NOTE — PATIENT INSTRUCTIONS
Prednisone 10 mg tabs: 2 tabs 3 x a day today and tomorrow, then 2 tabs bid x 3 days then 3 tabs in am x 3 days, then 2 tabs in am x 3 days, then 1 a day

## 2019-11-06 NOTE — PROGRESS NOTES
Chief Complaint   Patient presents with   • Cough     x2-3 weeks poss pnuemonia       HISTORY OF PRESENT ILLNESS: Patient is a 69 y.o. male established patient who presents today to discuss the medical issues below.    Chronic obstructive pulmonary disease with acute exacerbation (CMS-HCC)  Patient here with his daughter, he is complaining of of more trouble breathing x 3 weeks, he is using the duoneb 4-6 x a nite.  He he uses prn oxygen.  Patient has cough thick yellow mucous x 3 weeks, not eating, he is dyspnic with any exertion, cough is constant, nto sleeping due to the cough.  He continues to smoke states about 7 cigarettes a day.     DNR (do not resuscitate)  Patient is here with daughter in law, the two of them are able to tell me he is DNR and has a living will.  He is able to reiterate he doesn't want life support, he would reluctantly accept in hospital therapy short of intubation.  He doesn't want to go to the hospital today.       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    Current Outpatient Medications   Medication Sig Dispense Refill   • amoxicillin-clavulanate (AUGMENTIN) 875-125 MG Tab Take 1 Tab by mouth 2 times a day. 20 Tab 1   • predniSONE (DELTASONE) 10 MG Tab 2 tabs 3 x a day today and tomorrow, then 2 tabs bid x 3 days then 3 tabs in am x 3 days, then 2 tabs in am x 3 days, then 1 a day 40 Tab 1   • albuterol (VENTOLIN HFA) 108 (90 Base) MCG/ACT Aero Soln inhalation aerosol Inhale 2 Puffs by mouth every 6 hours as needed for  Shortness of Breath. 3 Inhaler 3   • budesonide (PULMICORT FLEXHALER) 90 MCG/ACT AEROSOL POWDER, BREATH ACTIVATED Inhale 2 Puffs by mouth 2 Times a Day. 3 Each 3   • ipratropium-albuterol (DUONEB) 0.5-2.5 (3) MG/3ML nebulizer solution 3 mL by Nebulization route 4 times a day. 90 Bullet 3   • lovastatin (MEVACOR) 20 MG Tab Take 1 Tab by mouth every day. 30 Tab 6   • aspirin EC (ECOTRIN) 325 MG Tablet Delayed Response Take 1 Tab by mouth every day. 30 Tab 6     No current facility-administered medications for this visit.          Past Medical History:   Diagnosis Date   • Cervical spine disease 3/30/2017   • Chronic obstructive pulmonary disease with acute exacerbation (HCC) 3/30/2017   • Dental caries 3/30/2017   • Pneumonia    • Tobacco abuse    • Tonsillitis        Social History     Tobacco Use   • Smoking status: Current Every Day Smoker     Packs/day: 0.50     Years: 50.00     Pack years: 25.00     Types: Cigarettes   • Smokeless tobacco: Never Used   • Tobacco comment: Started smoking in    Substance Use Topics   • Alcohol use: Yes     Alcohol/week: 8.4 - 12.6 oz     Types: 14 - 21 Cans of beer per week     Frequency: 4 or more times a week     Comment: Average per weekday 20 Average per weekend 6   • Drug use: No       Family Status   Relation Name Status   • Fa 52    • Mo 89 Alive   • Bro  Alive     Family History   Problem Relation Age of Onset   • Cancer Father         lung   • Other Mother         memory loss   • No Known Problems Brother        ROS:    Respiratory: Negative for cough, sputum production, shortness of breath or wheezing.    Cardiovascular: Negative for chest pain, palpitations, orthopnea, dyspnea with exertion or edema.   Gastrointestinal: Negative for GI upset, nausea, vomiting, abdominal pain, constipation or diarrhea.   Genitourinary: Negative for dysuria, urgency, hesitancy or frequency.       Exam:    /78 (BP Location: Right arm, Patient Position: Sitting)   Pulse 82  "  Temp 37.2 °C (99 °F)   Resp 16   Ht 1.854 m (6' 1\")   Wt 55.8 kg (123 lb)   SpO2 92%   General: Slender male in no respiratory distress  Pulmonary: Minimal air motion, vesicular in nature, prolonged expiration no overt rhonchi rales or wheezes  Cardiovascular: Regular rate and rhythm without murmur.   Abdomen: Normal bowel sounds soft and nontender no palpable liver spleen bladder mass.  Extremities: No LE edema noted.  Neuro: Grossly nonfocal.  Psych: Alert and oriented to person, place, and time. Appropriate mood and conversation.        This dictation was created using voice recognition software. I have made reasonable attempts to correct errors, however, errors of grammar and content may exist.          Assessment/Plan:    1. Chronic obstructive pulmonary disease with acute exacerbation (HCC)  Most likely secondary bacterial infection.  Monitor with course of aggressive antibiotics and steroids bolus with taper.  Continuing on DuoNeb therapy 4-6 times a day.  Discussed emergency room visit PRN any further exacerbation.  - amoxicillin-clavulanate (AUGMENTIN) 875-125 MG Tab; Take 1 Tab by mouth 2 times a day.  Dispense: 20 Tab; Refill: 1  - predniSONE (DELTASONE) 10 MG Tab; 2 tabs 3 x a day today and tomorrow, then 2 tabs bid x 3 days then 3 tabs in am x 3 days, then 2 tabs in am x 3 days, then 1 a day  Dispense: 40 Tab; Refill: 1    2. DNR (do not resuscitate)  Patient and daughter-in-law are able to clarify DNR/DNI.  He would accept hospitalization for maximizing breathing treatments however.  ER PRN any further exacerbation.    3.  Tobacco abuse  Discussed utilizing this opportunity to discontinue smoking.  Consideration for NicoDerm patch as needed cravings.     Patient was seen for 25 minutes face to face of which more than 50% of the time was spent in counseling and coordination of care regarding the above problems.    "

## 2019-11-06 NOTE — ASSESSMENT & PLAN NOTE
Patient here with his daughter, he is complaining of of more trouble breathing x 3 weeks, he is using the duoneb 4-6 x a nite.  He he uses prn oxygen.  Patient has cough thick yellow mucous x 3 weeks, not eating, he is dyspnic with any exertion, cough is constant, nto sleeping due to the cough.  He continues to smoke states about 7 cigarettes a day.

## 2019-11-06 NOTE — ASSESSMENT & PLAN NOTE
Patient is here with daughter in law, the two of them are able to tell me he is DNR and has a living will.  He is able to reiterate he doesn't want life support, he would reluctantly accept in hospital therapy short of intubation.  He doesn't want to go to the hospital today.

## 2019-11-27 ENCOUNTER — OFFICE VISIT (OUTPATIENT)
Dept: MEDICAL GROUP | Facility: PHYSICIAN GROUP | Age: 69
End: 2019-11-27
Payer: MEDICARE

## 2019-11-27 VITALS
HEIGHT: 73 IN | OXYGEN SATURATION: 95 % | HEART RATE: 85 BPM | SYSTOLIC BLOOD PRESSURE: 114 MMHG | TEMPERATURE: 97.4 F | DIASTOLIC BLOOD PRESSURE: 68 MMHG | WEIGHT: 126 LBS | BODY MASS INDEX: 16.7 KG/M2 | RESPIRATION RATE: 12 BRPM

## 2019-11-27 DIAGNOSIS — J44.1 CHRONIC OBSTRUCTIVE PULMONARY DISEASE WITH ACUTE EXACERBATION (HCC): ICD-10-CM

## 2019-11-27 DIAGNOSIS — Z72.0 TOBACCO ABUSE: ICD-10-CM

## 2019-11-27 DIAGNOSIS — R63.4 WEIGHT LOSS: ICD-10-CM

## 2019-11-27 PROCEDURE — 99214 OFFICE O/P EST MOD 30 MIN: CPT | Performed by: INTERNAL MEDICINE

## 2019-11-27 RX ORDER — PREDNISONE 10 MG/1
TABLET ORAL
Qty: 60 TAB | Refills: 1 | Status: SHIPPED | OUTPATIENT
Start: 2019-11-27 | End: 2020-02-10

## 2019-11-27 ASSESSMENT — PAIN SCALES - GENERAL: PAINLEVEL: NO PAIN

## 2019-11-27 NOTE — ASSESSMENT & PLAN NOTE
"Patient continues to smoke he says he is \"cutting back\" but unable to identify exactly how much.  "

## 2019-11-27 NOTE — ASSESSMENT & PLAN NOTE
Weight is stabilized he is eating well.  Daughter-in-law is encouraging nutrition however patient tends to skip quite a bit.

## 2019-11-27 NOTE — ASSESSMENT & PLAN NOTE
Patient reports his breathing is much better.  He reports he is supposed to be taking the prednisone twice daily but forgets every once in a while.  Daughter reporting 10 tablets in the prednisone dose left.  He continues to smoke

## 2019-11-27 NOTE — PROGRESS NOTES
"  Chief Complaint   Patient presents with   • Cough     FV       HISTORY OF PRESENT ILLNESS: Patient is a 69 y.o. male established patient who presents today to discuss the medical issues below.    Chronic obstructive pulmonary disease with acute exacerbation (HCC)  Patient reports his breathing is much better.  He reports he is supposed to be taking the prednisone twice daily but forgets every once in a while.  Daughter reporting 10 tablets in the prednisone dose left.  He continues to smoke.  He reports he is utilizing his DuoNeb machine 2-3 times in the evening and night.  All day during the day when he is out working he is not utilizing any nebulizers and denies any problems with shortness of breath.  Currently dry nonproductive cough no fevers chills.    Tobacco abuse  Patient continues to smoke he says he is \"cutting back\" but unable to identify exactly how much.    Weight loss  Weight is stabilized he is eating well.  Daughter-in-law is encouraging nutrition however patient tends to skip quite a bit.      Patient Active Problem List    Diagnosis Date Noted   • DNR (do not resuscitate) 11/06/2019     Priority: High   • Chronic obstructive pulmonary disease with acute exacerbation (HCC) 03/30/2017     Priority: High   • Tobacco abuse      Priority: High   • Cervical spine disease 03/30/2017     Priority: Medium   • Weight loss 08/26/2019   • Anxiety 08/26/2019   • Vitamin D deficiency 02/22/2019   • Other hyperlipidemia 10/19/2017   • Colon cancer screening 10/19/2017   • Dental caries 03/30/2017   • Cerebral microvascular disease 03/30/2017       Allergies:Patient has no known allergies.    Current Outpatient Medications   Medication Sig Dispense Refill   • predniSONE (DELTASONE) 10 MG Tab 1 a day 60 Tab 1   • amoxicillin-clavulanate (AUGMENTIN) 875-125 MG Tab Take 1 Tab by mouth 2 times a day. 20 Tab 1   • albuterol (VENTOLIN HFA) 108 (90 Base) MCG/ACT Aero Soln inhalation aerosol Inhale 2 Puffs by mouth " every 6 hours as needed for Shortness of Breath. 3 Inhaler 3   • budesonide (PULMICORT FLEXHALER) 90 MCG/ACT AEROSOL POWDER, BREATH ACTIVATED Inhale 2 Puffs by mouth 2 Times a Day. 3 Each 3   • ipratropium-albuterol (DUONEB) 0.5-2.5 (3) MG/3ML nebulizer solution 3 mL by Nebulization route 4 times a day. 90 Bullet 3   • lovastatin (MEVACOR) 20 MG Tab Take 1 Tab by mouth every day. 30 Tab 6   • aspirin EC (ECOTRIN) 325 MG Tablet Delayed Response Take 1 Tab by mouth every day. 30 Tab 6     No current facility-administered medications for this visit.          Past Medical History:   Diagnosis Date   • Cervical spine disease 3/30/2017   • Chronic obstructive pulmonary disease with acute exacerbation (HCC) 3/30/2017   • Dental caries 3/30/2017   • Pneumonia    • Tobacco abuse    • Tonsillitis        Social History     Tobacco Use   • Smoking status: Current Every Day Smoker     Packs/day: 0.50     Years: 50.00     Pack years: 25.00     Types: Cigarettes   • Smokeless tobacco: Never Used   • Tobacco comment: Started smoking in    Substance Use Topics   • Alcohol use: Yes     Alcohol/week: 8.4 - 12.6 oz     Types: 14 - 21 Cans of beer per week     Frequency: 4 or more times a week     Comment: Average per weekday 20 Average per weekend 6   • Drug use: No       Family Status   Relation Name Status   • Fa 52    • Mo 89 Alive   • Bro  Alive     Family History   Problem Relation Age of Onset   • Cancer Father         lung   • Other Mother         memory loss   • No Known Problems Brother        ROS:      Cardiovascular: Negative for chest pain, palpitations, orthopnea, dyspnea with exertion or edema.   Gastrointestinal: Negative for GI upset, nausea, vomiting, abdominal pain, constipation or diarrhea.   Genitourinary: Negative for dysuria, urgency, hesitancy or frequency.       Exam:   /68 (BP Location: Left arm, Patient Position: Sitting, BP Cuff Size: Adult)   Pulse 85   Temp 36.3 °C (97.4 °F) (Temporal)   " Resp 12   Ht 1.854 m (6' 1\")   Wt 57.2 kg (126 lb)   SpO2 95%   General:  Well nourished, well developed male in NAD.  Pulmonary: Vesicular air motion, mildly prolonged expiration.  No rhonchi rales wheezes.  Cardiovascular: Regular rate and rhythm without murmur.   Abdomen: Normal bowel sounds soft and nontender no palpable liver spleen bladder mass.  Extremities: No LE edema noted.  Neuro: Grossly nonfocal.  Psych: Alert and oriented to person, place, and time. Appropriate mood and conversation.        This dictation was created using voice recognition software. I have made reasonable attempts to correct errors, however, errors of grammar and content may exist.          Assessment/Plan:    1. Chronic obstructive pulmonary disease with acute exacerbation (HCC)  Much improved with the slower prednisone taper.  He is actually auto tapering by missing doses periodically.  We will attempt decreasing to 10 mg a day or every other day with early follow-up.  Continuing on 2-3 time a day DuoNeb therapy monitor  - predniSONE (DELTASONE) 10 MG Tab; 1 a day  Dispense: 60 Tab; Refill: 1    2. Tobacco abuse  Reviewed again suggestion for cutting dose especially during the day when he is out and cannot get to additional cigarettes.  Discussed chewing on straws.  Overall prognosis poor    3. Weight loss  Weight is stabilized monitor with ongoing steroid therapy       Patient was seen for 25 minutes face to face of which more than 50% of the time was spent in counseling and coordination of care regarding the above problems.      "

## 2020-01-06 ENCOUNTER — APPOINTMENT (OUTPATIENT)
Dept: MEDICAL GROUP | Facility: PHYSICIAN GROUP | Age: 70
End: 2020-01-06
Payer: MEDICARE

## 2020-02-10 ENCOUNTER — OFFICE VISIT (OUTPATIENT)
Dept: MEDICAL GROUP | Facility: PHYSICIAN GROUP | Age: 70
End: 2020-02-10
Payer: MEDICARE

## 2020-02-10 VITALS
HEART RATE: 87 BPM | WEIGHT: 126 LBS | OXYGEN SATURATION: 93 % | SYSTOLIC BLOOD PRESSURE: 122 MMHG | HEIGHT: 73 IN | BODY MASS INDEX: 16.7 KG/M2 | TEMPERATURE: 98.5 F | RESPIRATION RATE: 16 BRPM | DIASTOLIC BLOOD PRESSURE: 70 MMHG

## 2020-02-10 DIAGNOSIS — J44.9 CHRONIC OBSTRUCTIVE PULMONARY DISEASE, UNSPECIFIED COPD TYPE (HCC): ICD-10-CM

## 2020-02-10 DIAGNOSIS — J44.1 CHRONIC OBSTRUCTIVE PULMONARY DISEASE WITH ACUTE EXACERBATION (HCC): ICD-10-CM

## 2020-02-10 DIAGNOSIS — Z72.0 TOBACCO ABUSE: ICD-10-CM

## 2020-02-10 PROCEDURE — 99213 OFFICE O/P EST LOW 20 MIN: CPT | Performed by: INTERNAL MEDICINE

## 2020-02-10 RX ORDER — ALBUTEROL SULFATE 90 UG/1
2 AEROSOL, METERED RESPIRATORY (INHALATION) EVERY 6 HOURS PRN
Qty: 3 INHALER | Refills: 3 | Status: SHIPPED | OUTPATIENT
Start: 2020-02-10 | End: 2020-09-01 | Stop reason: SDUPTHER

## 2020-02-10 ASSESSMENT — PATIENT HEALTH QUESTIONNAIRE - PHQ9: CLINICAL INTERPRETATION OF PHQ2 SCORE: 0

## 2020-02-10 NOTE — PROGRESS NOTES
Chief Complaint   Patient presents with   • COPD   • Lab Results   • Medication Refill     pulmicort-Pt states starts new inhaler every 15 days       HISTORY OF PRESENT ILLNESS: Patient is a 69 y.o. male established patient who presents today to discuss the medical issues below.    Tobacco abuse  Patient reports he is still working on cutting back, he feels he is at 3 cigarettes a day!    Chronic obstructive pulmonary disease with acute exacerbation (HCC)  States he is doing well      Patient Active Problem List    Diagnosis Date Noted   • DNR (do not resuscitate) 11/06/2019     Priority: High   • Chronic obstructive pulmonary disease with acute exacerbation (HCC) 03/30/2017     Priority: High   • Tobacco abuse      Priority: High   • Cervical spine disease 03/30/2017     Priority: Medium   • Weight loss 08/26/2019   • Anxiety 08/26/2019   • Vitamin D deficiency 02/22/2019   • Other hyperlipidemia 10/19/2017   • Colon cancer screening 10/19/2017   • Dental caries 03/30/2017   • Cerebral microvascular disease 03/30/2017       Allergies:Patient has no known allergies.    Current Outpatient Medications   Medication Sig Dispense Refill   • Budesonide, Inhalation, 180 MCG/ACT AEROSOL POWDER, BREATH ACTIVATED Inhale 1 Puff by mouth 2 Times a Day. 3 Each 3   • albuterol (VENTOLIN HFA) 108 (90 Base) MCG/ACT Aero Soln inhalation aerosol Inhale 2 Puffs by mouth every 6 hours as needed for Shortness of Breath. 3 Inhaler 3   • budesonide (PULMICORT FLEXHALER) 90 MCG/ACT AEROSOL POWDER, BREATH ACTIVATED Inhale 2 Puffs by mouth 2 Times a Day. 3 Each 3   • ipratropium-albuterol (DUONEB) 0.5-2.5 (3) MG/3ML nebulizer solution 3 mL by Nebulization route 4 times a day. 90 Bullet 3   • aspirin EC (ECOTRIN) 325 MG Tablet Delayed Response Take 1 Tab by mouth every day. 30 Tab 6     No current facility-administered medications for this visit.          Past Medical History:   Diagnosis Date   • Cervical spine disease 3/30/2017   •  "Chronic obstructive pulmonary disease with acute exacerbation (HCC) 3/30/2017   • Dental caries 3/30/2017   • Pneumonia    • Tobacco abuse    • Tonsillitis        Social History     Tobacco Use   • Smoking status: Current Every Day Smoker     Packs/day: 0.50     Years: 50.00     Pack years: 25.00     Types: Cigarettes   • Smokeless tobacco: Never Used   • Tobacco comment: Started smoking in    Substance Use Topics   • Alcohol use: Yes     Alcohol/week: 8.4 - 12.6 oz     Types: 14 - 21 Cans of beer per week     Frequency: 4 or more times a week     Comment: Average per weekday 20 Average per weekend 6   • Drug use: No       Family Status   Relation Name Status   • Fa 52    • Mo 89 Alive   • Bro  Alive     Family History   Problem Relation Age of Onset   • Cancer Father         lung   • Other Mother         memory loss   • No Known Problems Brother        ROS:    Respiratory: Negative for cough, sputum production, shortness of breath or wheezing.    Cardiovascular: Negative for chest pain, palpitations, orthopnea, dyspnea with exertion or edema.   Gastrointestinal: Negative for GI upset, nausea, vomiting, abdominal pain, constipation or diarrhea.   Genitourinary: Negative for dysuria, urgency, hesitancy or frequency.       Exam:    /70 (BP Location: Right arm, Patient Position: Sitting, BP Cuff Size: Adult)   Pulse 87   Temp 36.9 °C (98.5 °F)   Resp 16   Ht 1.854 m (6' 1\")   Wt 57.2 kg (126 lb)   SpO2 93%  Body mass index is 16.62 kg/m².  General:  Well nourished, well developed male in NAD.    Pulmonary: Clear to ausculation and percussion.  Normal effort. No rales, rhonchi, or wheezing.  Cardiovascular: Regular rate and rhythm without murmur.   Abdomen: Normal bowel sounds soft and nontender no palpable liver spleen bladder mass.  Extremities: No LE edema noted.  Neuro: Grossly nonfocal.  Psych: Alert and oriented to person, place, and time. Appropriate mood and conversation.    LABS: Results " reviewed and discussed with the patient, questions answered.      This dictation was created using voice recognition software. I have made reasonable attempts to correct errors, however, errors of grammar and content may exist.          Assessment/Plan:    1. Chronic obstructive pulmonary disease, unspecified COPD type (HCC)  Doing much better with current regime.  Considering his cutting back on cigarettes he may be able to cut back on his steroids he is reticent to do this.  We will go ahead and refill of the steroid at Pulmicort 180/inh.  - albuterol (VENTOLIN HFA) 108 (90 Base) MCG/ACT Aero Soln inhalation aerosol; Inhale 2 Puffs by mouth every 6 hours as needed for Shortness of Breath.  Dispense: 3 Inhaler; Refill: 3    2. Tobacco abuse  Continued support given.  He is improved remarkably both from pulmonary standpoint and amount of cigarette intake.  Patient is attempting to be able to discontinue completely.    3. Chronic obstructive pulmonary disease with acute exacerbation (HCC)  As discussed above.       Patient was seen for 20 minutes face to face of which more than 50% of the time was spent in counseling and coordination of care regarding the above problems.

## 2020-04-24 NOTE — PATIENT INSTRUCTIONS
"Smoking Cessation, Tips for Success  If you are ready to quit smoking, congratulations! You have chosen to help yourself be healthier. Cigarettes bring nicotine, tar, carbon monoxide, and other irritants into your body. Your lungs, heart, and blood vessels will be able to work better without these poisons. There are many different ways to quit smoking. Nicotine gum, nicotine patches, a nicotine inhaler, or nicotine nasal spray can help with physical craving. Hypnosis, support groups, and medicines help break the habit of smoking.  WHAT THINGS CAN I DO TO MAKE QUITTING EASIER?   Here are some tips to help you quit for good:  · Pick a date when you will quit smoking completely. Tell all of your friends and family about your plan to quit on that date.  · Do not try to slowly cut down on the number of cigarettes you are smoking. Pick a quit date and quit smoking completely starting on that day.  · Throw away all cigarettes.    · Clean and remove all ashtrays from your home, work, and car.  · On a card, write down your reasons for quitting. Carry the card with you and read it when you get the urge to smoke.  · Cleanse your body of nicotine. Drink enough water and fluids to keep your urine clear or pale yellow. Do this after quitting to flush the nicotine from your body.  · Learn to predict your moods. Do not let a bad situation be your excuse to have a cigarette. Some situations in your life might tempt you into wanting a cigarette.  · Never have \"just one\" cigarette. It leads to wanting another and another. Remind yourself of your decision to quit.  · Change habits associated with smoking. If you smoked while driving or when feeling stressed, try other activities to replace smoking. Stand up when drinking your coffee. Brush your teeth after eating. Sit in a different chair when you read the paper. Avoid alcohol while trying to quit, and try to drink fewer caffeinated beverages. Alcohol and caffeine may urge you to " Spoke to patient who reports for 2 days R eye has been constantly watering and is red.     Patient denied injury, pain, burning, swelling, and drainage-other than tears, stye/bump. States \"It's nothing serious, it's just really irritating\".    Patient reports he has severe seasonal allergies  and takes Claritin daily but the constant watering is irritating. Has been using [place] eye drops without relief. Found some improvement after taking a shower and flushing eye with water.    Dr. Mata please see above, patient requesting recommendations/different eye drops to try.   "smoke.  · Avoid foods and drinks that can trigger a desire to smoke, such as sugary or spicy foods and alcohol.  · Ask people who smoke not to smoke around you.  · Have something planned to do right after eating or having a cup of coffee. For example, plan to take a walk or exercise.  · Try a relaxation exercise to calm you down and decrease your stress. Remember, you may be tense and nervous for the first 2 weeks after you quit, but this will pass.  · Find new activities to keep your hands busy. Play with a pen, coin, or rubber band. Doodle or draw things on paper.  · Brush your teeth right after eating. This will help cut down on the craving for the taste of tobacco after meals. You can also try mouthwash.    · Use oral substitutes in place of cigarettes. Try using lemon drops, carrots, cinnamon sticks, or chewing gum. Keep them handy so they are available when you have the urge to smoke.  · When you have the urge to smoke, try deep breathing.  · Designate your home as a nonsmoking area.  · If you are a heavy smoker, ask your health care provider about a prescription for nicotine chewing gum. It can ease your withdrawal from nicotine.  · Reward yourself. Set aside the cigarette money you save and buy yourself something nice.  · Look for support from others. Join a support group or smoking cessation program. Ask someone at home or at work to help you with your plan to quit smoking.  · Always ask yourself, \"Do I need this cigarette or is this just a reflex?\" Tell yourself, \"Today, I choose not to smoke,\" or \"I do not want to smoke.\" You are reminding yourself of your decision to quit.  · Do not replace cigarette smoking with electronic cigarettes (commonly called e-cigarettes). The safety of e-cigarettes is unknown, and some may contain harmful chemicals.  · If you relapse, do not give up! Plan ahead and think about what you will do the next time you get the urge to smoke.  HOW WILL I FEEL WHEN I QUIT SMOKING?  You " may have symptoms of withdrawal because your body is used to nicotine (the addictive substance in cigarettes). You may crave cigarettes, be irritable, feel very hungry, cough often, get headaches, or have difficulty concentrating. The withdrawal symptoms are only temporary. They are strongest when you first quit but will go away within 10-14 days. When withdrawal symptoms occur, stay in control. Think about your reasons for quitting. Remind yourself that these are signs that your body is healing and getting used to being without cigarettes. Remember that withdrawal symptoms are easier to treat than the major diseases that smoking can cause.   Even after the withdrawal is over, expect periodic urges to smoke. However, these cravings are generally short lived and will go away whether you smoke or not. Do not smoke!  WHAT RESOURCES ARE AVAILABLE TO HELP ME QUIT SMOKING?  Your health care provider can direct you to community resources or hospitals for support, which may include:  · Group support.  · Education.  · Hypnosis.  · Therapy.     This information is not intended to replace advice given to you by your health care provider. Make sure you discuss any questions you have with your health care provider.     Document Released: 09/15/2005 Document Revised: 01/08/2016 Document Reviewed: 06/05/2014  [employer] Patient Education ©2016 [employer]

## 2020-05-20 ENCOUNTER — OFFICE VISIT (OUTPATIENT)
Dept: MEDICAL GROUP | Facility: PHYSICIAN GROUP | Age: 70
End: 2020-05-20
Payer: MEDICARE

## 2020-05-20 VITALS
TEMPERATURE: 98.8 F | OXYGEN SATURATION: 90 % | RESPIRATION RATE: 14 BRPM | WEIGHT: 126.1 LBS | SYSTOLIC BLOOD PRESSURE: 126 MMHG | HEART RATE: 100 BPM | BODY MASS INDEX: 16.64 KG/M2 | DIASTOLIC BLOOD PRESSURE: 84 MMHG

## 2020-05-20 DIAGNOSIS — J44.1 CHRONIC OBSTRUCTIVE PULMONARY DISEASE WITH ACUTE EXACERBATION (HCC): ICD-10-CM

## 2020-05-20 DIAGNOSIS — Z72.0 TOBACCO ABUSE: ICD-10-CM

## 2020-05-20 PROCEDURE — 99214 OFFICE O/P EST MOD 30 MIN: CPT | Performed by: INTERNAL MEDICINE

## 2020-05-20 RX ORDER — BUDESONIDE 0.25 MG/2ML
250 INHALANT ORAL 2 TIMES DAILY
Qty: 180 BULLET | Refills: 3 | Status: SHIPPED | OUTPATIENT
Start: 2020-05-20

## 2020-05-20 ASSESSMENT — FIBROSIS 4 INDEX: FIB4 SCORE: 1.18

## 2020-05-20 NOTE — ASSESSMENT & PLAN NOTE
Reports he is unable to inhale cigarettes or he coughs.   He continues to lite but cannot inhale without coughing.

## 2020-05-20 NOTE — PROGRESS NOTES
Chief Complaint   Patient presents with   • Follow-Up     copd- o2        HISTORY OF PRESENT ILLNESS: Patient is a 69 y.o. male established patient who presents today to discuss the medical issues below.    Chronic obstructive pulmonary disease with acute exacerbation (HCC)  Patient reports he is doing ok however, is using the MDI inhaler really frequently at 7-8 x a day.  He uses the budesonide MDI bid and the duoneb qid.  Cigarettes down to 3 a day.      Tobacco abuse  Reports he is unable to inhale cigarettes or he coughs.   He continues to lite but cannot inhale without coughing.        Patient Active Problem List    Diagnosis Date Noted   • DNR (do not resuscitate) 11/06/2019     Priority: High   • Chronic obstructive pulmonary disease with acute exacerbation (HCC) 03/30/2017     Priority: High   • Tobacco abuse      Priority: High   • Cervical spine disease 03/30/2017     Priority: Medium   • Weight loss 08/26/2019   • Anxiety 08/26/2019   • Vitamin D deficiency 02/22/2019   • Other hyperlipidemia 10/19/2017   • Colon cancer screening 10/19/2017   • Dental caries 03/30/2017   • Cerebral microvascular disease 03/30/2017       Allergies:Patient has no known allergies.    Current Outpatient Medications   Medication Sig Dispense Refill   • budesonide (PULMICORT) 0.25 MG/2ML Suspension 2 mL by Nebulization route 2 times a day. 180 Bullet 3   • Budesonide, Inhalation, 180 MCG/ACT AEROSOL POWDER, BREATH ACTIVATED Inhale 1 Puff by mouth 2 Times a Day. 3 Each 3   • albuterol (VENTOLIN HFA) 108 (90 Base) MCG/ACT Aero Soln inhalation aerosol Inhale 2 Puffs by mouth every 6 hours as needed for Shortness of Breath. 3 Inhaler 3   • ipratropium-albuterol (DUONEB) 0.5-2.5 (3) MG/3ML nebulizer solution 3 mL by Nebulization route 4 times a day. 90 Bullet 3   • aspirin EC (ECOTRIN) 325 MG Tablet Delayed Response Take 1 Tab by mouth every day. 30 Tab 6     No current facility-administered medications for this visit.           Past Medical History:   Diagnosis Date   • Cervical spine disease 3/30/2017   • Chronic obstructive pulmonary disease with acute exacerbation (HCC) 3/30/2017   • Dental caries 3/30/2017   • Pneumonia    • Tobacco abuse    • Tonsillitis        Social History     Tobacco Use   • Smoking status: Current Every Day Smoker     Packs/day: 0.50     Years: 50.00     Pack years: 25.00     Types: Cigarettes   • Smokeless tobacco: Never Used   • Tobacco comment: Started smoking in    Substance Use Topics   • Alcohol use: Yes     Alcohol/week: 8.4 - 12.6 oz     Types: 14 - 21 Cans of beer per week     Frequency: 4 or more times a week     Comment: Average per weekday 20 Average per weekend 6   • Drug use: No       Family Status   Relation Name Status   • Fa 52    • Mo 89 Alive   • Bro  Alive     Family History   Problem Relation Age of Onset   • Cancer Father         lung   • Other Mother         memory loss   • No Known Problems Brother        ROS:      Cardiovascular: Negative for chest pain, palpitations, orthopnea, dyspnea with exertion or edema.   Gastrointestinal: Negative for GI upset, nausea, vomiting, abdominal pain, constipation or diarrhea.   Genitourinary: Negative for dysuria, urgency, hesitancy or frequency.       Exam:    /84 (BP Location: Right arm, Patient Position: Sitting, BP Cuff Size: Adult)   Pulse 100   Temp 37.1 °C (98.8 °F) (Temporal)   Resp 14   Wt 57.2 kg (126 lb 1.6 oz)   SpO2 90%  Body mass index is 16.64 kg/m².  General:  Well nourished, well developed male in NAD.  Pulmonary: Very distant vesicular breath sounds no rhonchi rales wheezes.  Moderately prolonged expiration.  Cardiovascular: Regular rate and rhythm without murmur.   Abdomen: Normal bowel sounds soft and nontender no palpable liver spleen bladder mass.  Extremities: No LE edema noted.  Neuro: Grossly nonfocal.  Psych: Alert and oriented to person, place, and time. Appropriate mood and  conversation.    Walking oxygen: At rest O2 sat 90% with a pulse of 100 and a respiratory rate of 14.  Walking to dyspnea O2 sat at 90% with a pulse of 120.  After sitting and resting pulse to 100 and O2 sat at 92.    This dictation was created using voice recognition software. I have made reasonable attempts to correct errors, however, errors of grammar and content may exist.          Assessment/Plan:    1. Chronic obstructive pulmonary disease with acute exacerbation (HCC)  Patient clearly over utilizing bronchodilators.  Here in the office his walking test does not indicate hypoxia with exertion.  Will attempt to change his Pulmicort to nebulizer solution.  Check chest x-ray last one 1 year ago which was negative for significant findings.  Proceed to pulmonary function testing and referral to pulmonology.  - PULMONARY FUNCTION TESTS -Test requested: Complete Pulmonary Function Test; Future  - DX-CHEST-2 VIEWS; Future  - REFERRAL TO PULMONOLOGY    2. Tobacco abuse  Although patient continues to light his cigarettes he variably is not smoking any longer.  Support given.       Patient was seen for 25 minutes face to face of which more than 50% of the time was spent in counseling and coordination of care regarding the above problems.

## 2020-05-20 NOTE — ASSESSMENT & PLAN NOTE
Patient reports he is doing ok however, is using the MDI inhaler really frequently at 7-8 x a day.  He uses the budesonide MDI bid and the duoneb qid.  Cigarettes down to 3 a day.

## 2020-06-01 ENCOUNTER — TELEPHONE (OUTPATIENT)
Dept: HEALTH INFORMATION MANAGEMENT | Facility: OTHER | Age: 70
End: 2020-06-01

## 2020-06-01 NOTE — TELEPHONE ENCOUNTER
1. Caller Name: Kobe Morris                 Call Back Number: [phone]  [facility] PCP or Specialty Provider: Yes Dr. Matos        2.  Does patient have any new or worsening symptoms of respiratory illness? Yes, the patient reports the following respiratory symptoms: cough. Chronic cough with no changes  3.  Does patient have any comoribidities? COPD    4.  Has the patient traveled in the last 14 days OR had any known contact with someone who is suspected or confirmed to have COVID-19?  No.    5. Disposition: Cleared by RN Triage as potential is low for COVID-19; OK to keep/schedule appointment  PFT scheduled for patient for August 6, 2020    Note routed to [facility] Provider: [facility] only.

## 2020-07-20 ENCOUNTER — APPOINTMENT (OUTPATIENT)
Dept: RADIOLOGY | Facility: IMAGING CENTER | Age: 70
End: 2020-07-20
Attending: INTERNAL MEDICINE
Payer: MEDICARE

## 2020-07-20 ENCOUNTER — APPOINTMENT (OUTPATIENT)
Dept: URGENT CARE | Facility: PHYSICIAN GROUP | Age: 70
End: 2020-07-20
Payer: MEDICARE

## 2020-07-20 DIAGNOSIS — J44.1 CHRONIC OBSTRUCTIVE PULMONARY DISEASE WITH ACUTE EXACERBATION (HCC): ICD-10-CM

## 2020-07-20 PROCEDURE — 71046 X-RAY EXAM CHEST 2 VIEWS: CPT | Mod: TC,FY | Performed by: PHYSICIAN ASSISTANT

## 2020-07-23 ENCOUNTER — TELEPHONE (OUTPATIENT)
Dept: SCHEDULING | Facility: IMAGING CENTER | Age: 70
End: 2020-07-23

## 2020-07-23 DIAGNOSIS — F17.210 CIGARETTE SMOKER: ICD-10-CM

## 2020-07-23 DIAGNOSIS — Z12.2 ENCOUNTER FOR SCREENING FOR MALIGNANT NEOPLASM OF RESPIRATORY ORGANS: ICD-10-CM

## 2020-07-23 NOTE — TELEPHONE ENCOUNTER
Kobe is due for a low-dose chest CT for annual lung cancer screening. The patient has been asked the screening questions and qualifies and would like the LDCT to be completed. Please review the pended order and sign. If this is a test you wish for Kobe not to receive, please respond with reason why and route back to the Patient Outreach Team.

## 2020-09-01 ENCOUNTER — OFFICE VISIT (OUTPATIENT)
Dept: MEDICAL GROUP | Facility: PHYSICIAN GROUP | Age: 70
End: 2020-09-01
Payer: MEDICARE

## 2020-09-01 VITALS
SYSTOLIC BLOOD PRESSURE: 124 MMHG | RESPIRATION RATE: 14 BRPM | HEART RATE: 87 BPM | TEMPERATURE: 98 F | OXYGEN SATURATION: 94 % | WEIGHT: 123.8 LBS | HEIGHT: 73 IN | DIASTOLIC BLOOD PRESSURE: 80 MMHG | BODY MASS INDEX: 16.41 KG/M2

## 2020-09-01 DIAGNOSIS — J44.1 CHRONIC OBSTRUCTIVE PULMONARY DISEASE WITH ACUTE EXACERBATION (HCC): ICD-10-CM

## 2020-09-01 DIAGNOSIS — Z72.0 TOBACCO ABUSE: ICD-10-CM

## 2020-09-01 DIAGNOSIS — J44.9 CHRONIC OBSTRUCTIVE PULMONARY DISEASE, UNSPECIFIED COPD TYPE (HCC): ICD-10-CM

## 2020-09-01 DIAGNOSIS — Z66 DO NOT RESUSCITATE STATUS: ICD-10-CM

## 2020-09-01 DIAGNOSIS — E55.9 VITAMIN D DEFICIENCY: ICD-10-CM

## 2020-09-01 PROCEDURE — 99214 OFFICE O/P EST MOD 30 MIN: CPT | Performed by: INTERNAL MEDICINE

## 2020-09-01 RX ORDER — ALBUTEROL SULFATE 90 UG/1
2 AEROSOL, METERED RESPIRATORY (INHALATION) EVERY 6 HOURS PRN
Qty: 3 EACH | Refills: 3 | Status: SHIPPED | OUTPATIENT
Start: 2020-09-01 | End: 2021-07-22 | Stop reason: SDUPTHER

## 2020-09-01 ASSESSMENT — FIBROSIS 4 INDEX: FIB4 SCORE: 1.2

## 2020-09-01 NOTE — ASSESSMENT & PLAN NOTE
Daughter is here today, he feels he is fine, daughter states he is not eating well, he is smoking about 1 ppd now.

## 2020-09-01 NOTE — ASSESSMENT & PLAN NOTE
Appointment with pulmonary was rescheduled now Oct.  Continues to smoke.  Last cxr in July at baseline with hyperinflation.  He reports he is using the duoneb about 1 x a day, uses the MDI q 2-3 hours.  States he is not sob at nite even though he is awake.  He is using the pulmicort bid.

## 2020-09-01 NOTE — PROGRESS NOTES
Chief Complaint   Patient presents with   • COPD       HISTORY OF PRESENT ILLNESS: Patient is a 70 y.o. male established patient who presents today to discuss the medical issues below.    Chronic obstructive pulmonary disease with acute exacerbation (HCC)  Appointment with pulmonary was rescheduled now Oct.  Continues to smoke.  Last cxr in July at baseline with hyperinflation.  He reports he is using the duoneb about 1 x a day, uses the MDI q 2-3 hours.  States he is not sob at nite even though he is awake.  He is using the pulmicort bid.     Tobacco abuse  Daughter is here today, he feels he is fine, daughter states he is not eating well, he is smoking about 1 ppd now.        Patient Active Problem List    Diagnosis Date Noted   • DNR (do not resuscitate) 11/06/2019     Priority: High   • Chronic obstructive pulmonary disease with acute exacerbation (HCC) 03/30/2017     Priority: High   • Tobacco abuse      Priority: High   • Cervical spine disease 03/30/2017     Priority: Medium   • Weight loss 08/26/2019   • Anxiety 08/26/2019   • Vitamin D deficiency 02/22/2019   • Other hyperlipidemia 10/19/2017   • Colon cancer screening 10/19/2017   • Dental caries 03/30/2017   • Cerebral microvascular disease 03/30/2017       Allergies:Patient has no known allergies.      Current Outpatient Medications   Medication Sig Dispense Refill   • albuterol (VENTOLIN HFA) 108 (90 Base) MCG/ACT Aero Soln inhalation aerosol Inhale 2 Puffs by mouth every 6 hours as needed for Shortness of Breath. 3 Each 3   • budesonide (PULMICORT) 0.25 MG/2ML Suspension 2 mL by Nebulization route 2 times a day. 180 Bullet 3   • Budesonide, Inhalation, 180 MCG/ACT AEROSOL POWDER, BREATH ACTIVATED Inhale 1 Puff by mouth 2 Times a Day. 3 Each 3   • ipratropium-albuterol (DUONEB) 0.5-2.5 (3) MG/3ML nebulizer solution 3 mL by Nebulization route 4 times a day. 90 Bullet 3   • aspirin EC (ECOTRIN) 325 MG Tablet Delayed Response Take 1 Tab by mouth every  "day.  Tab 6     No current facility-administered medications for this visit.          Past Medical History:   Diagnosis Date   • Cervical spine disease 3/30/2017   • Chronic obstructive pulmonary disease with acute exacerbation (HCC) 3/30/2017   • Dental caries 3/30/2017   • Pneumonia    • Tobacco abuse    • Tonsillitis        Social History     Tobacco Use   • Smoking status: Current Every Day Smoker     Packs/day: 0.50     Years: 50.00     Pack years: 25.00     Types: Cigarettes   • Smokeless tobacco: Never Used   • Tobacco comment: Started smoking in    Substance Use Topics   • Alcohol use: Yes     Alcohol/week: 8.4 - 12.6 oz     Types: 14 - 21 Cans of beer per week     Frequency: 4 or more times a week     Comment: Average per weekday 20 Average per weekend 6   • Drug use: No       Family Status   Relation Name Status   • Fa 52    • Mo 89 Alive   • Bro  Alive     Family History   Problem Relation Age of Onset   • Cancer Father         lung   • Other Mother         memory loss   • No Known Problems Brother        ROS:      Respiratory: Negative for cough, sputum production, shortness of breath or wheezing.    Cardiovascular: Negative for chest pain, palpitations, orthopnea, dyspnea with exertion or edema.   Gastrointestinal: Negative for GI upset, nausea, vomiting, abdominal pain, constipation or diarrhea.   Genitourinary: Negative for dysuria, urgency, hesitancy or frequency.       Exam:      /80 (BP Location: Left arm, Patient Position: Sitting, BP Cuff Size: Adult)   Pulse 87   Temp 36.7 °C (98 °F) (Temporal)   Resp 14   Ht 1.854 m (6' 1\")   Wt 56.2 kg (123 lb 12.8 oz)   SpO2 94%  Body mass index is 16.33 kg/m².  General:  Well nourished, well developed male in NAD.  Pulmonary: Coarse breath sounds throughout.  Bibasilar coarse rhonchi.  Persist after deep inspiration and cough.  No wheezes  Cardiovascular: Regular rate and rhythm without murmur.   Abdomen: Normal bowel sounds soft " and nontender no palpable liver spleen bladder mass.  Extremities: No LE edema noted.  Neuro: Grossly nonfocal.  Psych: Alert and oriented to person, place, and time. Appropriate mood and conversation.    No recent labs    This dictation was created using voice recognition software. I have made reasonable attempts to correct errors, however, errors of grammar and content may exist.          Assessment/Plan:    1. Chronic obstructive pulmonary disease with acute exacerbation (HCC)  Attempt again to coordinate diagnostic testing of CT scan of the lung and pulmonary function tests prior to pulmonary office visit.  Discussed with the patient the need to utilize nebulizer treatment 4 times a day and the MDI inhaler only as rescue.  Daughter in attendance.  - PULMONARY FUNCTION TESTS -Test requested: Spirometry with-out & with Bronchodilator; Future  - CBC WITH DIFFERENTIAL; Future  - Comp Metabolic Panel; Future    2. Tobacco abuse  Patient indicates he does not really smoke he simply lights his cigarettes and lets them burn.  I suggest that this is wasting money at the minimum.  I suspect he smoking more than he acknowledges or realizes.  Discussed trial placing the cigarettes outside of his office plate space.  Ongoing smoking implications discussed with patient and daughter.  Patient is very entrenched, however.  Declines medication trial.  - PULMONARY FUNCTION TESTS -Test requested: Spirometry with-out & with Bronchodilator; Future    3. Vitamin D deficiency  States he is taking his vitamin D will be due for lab assessment  - VITAMIN D,25 HYDROXY; Future    4. Chronic obstructive pulmonary disease, unspecified COPD type (HCC)  As discussed above attempt to maximize bronchodilator with nebulizer machine however refill on albuterol MDI.  He does develop panic without having that on his person.  - albuterol (VENTOLIN HFA) 108 (90 Base) MCG/ACT Aero Soln inhalation aerosol; Inhale 2 Puffs by mouth every 6 hours as needed  for Shortness of Breath.  Dispense: 3 Each; Refill: 3    5. DNR  Reviewed with patient and daughter.  He continues to decline intubation.     Patient was seen for 25 minutes face to face of which more than 50% of the time was spent in counseling and coordination of care regarding the above problems.

## 2020-09-01 NOTE — PATIENT INSTRUCTIONS
Pulmonologist appointment is Oct 22, 2020    Contact scheduling about CT lung cancer screen and Pulmonary function testing.

## 2020-09-15 ENCOUNTER — TELEPHONE (OUTPATIENT)
Dept: HEALTH INFORMATION MANAGEMENT | Facility: OTHER | Age: 70
End: 2020-09-15

## 2020-09-15 DIAGNOSIS — F17.210 CIGARETTE SMOKER: ICD-10-CM

## 2020-09-15 NOTE — TELEPHONE ENCOUNTER
2. SPECIFIC Action To Be Taken: Orders pending, please sign.    Order placed during his office visit was cancelled.  Not sure why (if patient refused?), so I am pending another order.

## 2020-10-20 ENCOUNTER — HOSPITAL ENCOUNTER (OUTPATIENT)
Dept: RADIOLOGY | Facility: MEDICAL CENTER | Age: 70
End: 2020-10-20
Attending: INTERNAL MEDICINE
Payer: MEDICARE

## 2020-10-20 DIAGNOSIS — F17.210 CIGARETTE SMOKER: ICD-10-CM

## 2020-10-20 PROCEDURE — G0297 LDCT FOR LUNG CA SCREEN: HCPCS

## 2020-10-22 ENCOUNTER — OFFICE VISIT (OUTPATIENT)
Dept: PULMONOLOGY | Facility: HOSPICE | Age: 70
End: 2020-10-22
Payer: MEDICARE

## 2020-10-22 ENCOUNTER — NON-PROVIDER VISIT (OUTPATIENT)
Dept: PULMONOLOGY | Facility: HOSPICE | Age: 70
End: 2020-10-22
Attending: INTERNAL MEDICINE
Payer: MEDICARE

## 2020-10-22 VITALS
SYSTOLIC BLOOD PRESSURE: 132 MMHG | RESPIRATION RATE: 16 BRPM | HEIGHT: 71 IN | HEART RATE: 93 BPM | TEMPERATURE: 98.8 F | OXYGEN SATURATION: 94 % | DIASTOLIC BLOOD PRESSURE: 78 MMHG | BODY MASS INDEX: 17.08 KG/M2 | WEIGHT: 122 LBS

## 2020-10-22 VITALS — BODY MASS INDEX: 17.08 KG/M2 | HEIGHT: 71 IN | WEIGHT: 122 LBS

## 2020-10-22 DIAGNOSIS — J44.1 CHRONIC OBSTRUCTIVE PULMONARY DISEASE WITH ACUTE EXACERBATION (HCC): ICD-10-CM

## 2020-10-22 DIAGNOSIS — Z12.2 ENCOUNTER FOR SCREENING FOR MALIGNANT NEOPLASM OF RESPIRATORY ORGANS: ICD-10-CM

## 2020-10-22 DIAGNOSIS — R91.8 PULMONARY NODULES: ICD-10-CM

## 2020-10-22 DIAGNOSIS — Z72.0 TOBACCO USE: ICD-10-CM

## 2020-10-22 DIAGNOSIS — R63.6 UNDERWEIGHT: ICD-10-CM

## 2020-10-22 DIAGNOSIS — F17.210 NICOTINE DEPENDENCE, CIGARETTES, UNCOMPLICATED: ICD-10-CM

## 2020-10-22 PROCEDURE — 94726 PLETHYSMOGRAPHY LUNG VOLUMES: CPT | Performed by: INTERNAL MEDICINE

## 2020-10-22 PROCEDURE — 94761 N-INVAS EAR/PLS OXIMETRY MLT: CPT | Performed by: INTERNAL MEDICINE

## 2020-10-22 PROCEDURE — 94729 DIFFUSING CAPACITY: CPT | Performed by: INTERNAL MEDICINE

## 2020-10-22 PROCEDURE — 94010 BREATHING CAPACITY TEST: CPT | Performed by: INTERNAL MEDICINE

## 2020-10-22 PROCEDURE — 99214 OFFICE O/P EST MOD 30 MIN: CPT | Mod: 25 | Performed by: INTERNAL MEDICINE

## 2020-10-22 SDOH — HEALTH STABILITY: MENTAL HEALTH: HOW OFTEN DO YOU HAVE 6 OR MORE DRINKS ON ONE OCCASION?: NEVER

## 2020-10-22 SDOH — HEALTH STABILITY: MENTAL HEALTH: HOW MANY STANDARD DRINKS CONTAINING ALCOHOL DO YOU HAVE ON A TYPICAL DAY?: 1 OR 2

## 2020-10-22 ASSESSMENT — ENCOUNTER SYMPTOMS
TREMORS: 0
NECK PAIN: 0
DOUBLE VISION: 0
ORTHOPNEA: 0
COUGH: 1
SPUTUM PRODUCTION: 0
BLURRED VISION: 0
PHOTOPHOBIA: 0
DEPRESSION: 0
WEAKNESS: 0
FEVER: 0
SINUS PAIN: 0
FOCAL WEAKNESS: 0
DIZZINESS: 0
CLAUDICATION: 0
CHILLS: 0
STRIDOR: 0
WEIGHT LOSS: 0
SORE THROAT: 0
SHORTNESS OF BREATH: 1
HEADACHES: 0
SPEECH CHANGE: 0
CONSTIPATION: 0
VOMITING: 0
NAUSEA: 0
DIARRHEA: 0
MYALGIAS: 0
EYE PAIN: 0
ABDOMINAL PAIN: 0
PND: 0
HEARTBURN: 0
EYE DISCHARGE: 0
DIAPHORESIS: 0
PALPITATIONS: 0
FALLS: 0
BACK PAIN: 0
WHEEZING: 0
HEMOPTYSIS: 0
EYE REDNESS: 0

## 2020-10-22 ASSESSMENT — PULMONARY FUNCTION TESTS
FEV1_PERCENT_PREDICTED: 37
FEV1: 1.28
FEV1/FVC_PERCENT_PREDICTED: 51
FVC: 3.34
FEV1/FVC_PREDICTED: 74
FVC_PREDICTED: 4.63
FVC_LLN: 3.87
FEV1/FVC_PERCENT_LLN: 62
FEV1/FVC: 38
FEV1_PREDICTED: 3.42
FVC_PERCENT_PREDICTED: 72
FEV1/FVC_PERCENT_PREDICTED: 51
FEV1/FVC: 38
FEV1/FVC_PERCENT_PREDICTED: 74
FEV1_LLN: 2.86

## 2020-10-22 ASSESSMENT — FIBROSIS 4 INDEX
FIB4 SCORE: 1.2
FIB4 SCORE: 1.2

## 2020-10-22 NOTE — PROCEDURES
Multi-Ox Readings  Multi Ox #1 Room air   O2 sat % at rest 95   O2 sat % on exertion 91   O2 sat average on exertion 93   Multi Ox #2     O2 sat % at rest     O2 sat % on exertion     O2 sat average on exertion       Oxygen Use     Oxygen Frequency     Duration of need     Is the patient mobile within the home?     CPAP Use?     BIPAP Use?     Servo Titration

## 2020-10-22 NOTE — PROCEDURES
Tech: Simin Arriola, RRT  Tech notes: Good patient effort & cooperation.  Post bronchodilator not done, due to bronchodilators taken prior to testing procedure.  Patient had difficulty with closed panting maneuver in Pleth.  DLCO measurement may be an effect of patient smoking several cigarettes prior to testing.  The results of this test meet the ATS/ERS standards for acceptability & reproducibility.  Test was performed on the [employer] Plethysmograph-Elite DX system.  Predicted values were GLI-2012 for spirometry, GLI- 2017 for DLCO, ITS for Lung Volumes.  The DLCO was uncorrected for Hgb.    Interpretation:  1.  Baseline spirometry shows severe airflow obstruction with FEV1/FVC ratio of 38 and FEV1 of 1.28 L or 37% predicted.  2.  Bronchodilator testing was not performed.  3.  There is borderline hyperinflation with total lung capacity at the upper limits of normal at 8.51 L or 117% predicted.  There is severe air trapping.  4.  Diffusion capacity is mildly reduced at 69% predicted.  Pulmonary function testing shows severe obstructive lung disease consistent with COPD.

## 2020-10-22 NOTE — PROGRESS NOTES
Chief Complaint   Patient presents with   • Follow-Up     last seen 6/25/19 Dr. Simmons    • Results     CT lung cancer 10/20/2020, CPFT 60 10/22/2020          HPI: This patient is a 70 y.o. male whom is followed in our clinic for pulmonary nodules and presumed COPD in the setting of chronic tobacco use last seen by Dr Simmons on 6/25/2019.  The patient is an active tobacco user with greater than 60-pack-year history.  He has been followed in our clinic for pulmonary nodules first noted on CT scan from June 2017 or a 7 mm left lower lobe nodule in addition to several smaller nodules were noted as well as severe emphysema and some parenchymal scarring left lower lobe.  These findings are stable in January 2018.  Patient did suffer from pneumonia in January 2019 and a subsequent chest x-ray showed clearance of the pneumonia.  CT chest from June 2019 showed stable pulmonary nodules and emphysema.  He apparently has been prescribed long-acting inhaler such as Anoro in the past but was not using them due to cost.  He has been using both nebulized bronchodilators and metered-dose inhaler between 1 and 3 times daily.  More recently he was put on Pulmicort by his primary care provider but is only using this on average 3 times per week.  He does report severe dyspnea with any activity that has been slowly progressive over the past several years.  He has chronic smoker's cough with difficulty expectorating mucus but does feel that the nebulizer helps with his chest congestion.  He is cachectic on exam today.  No fevers, chills, night sweats.  He had his first pulmonary function testing done today which shows severe airflow obstruction with an FEV1 of 1.28 L or 37% predicted and an FEV1/FVC ratio of 38.  Total capacity is upper limits of normal at 117% predicted however there is severe air trapping and mildly reduced DLCO at 69% predicted.  Follow-up CT chest on October 20 showed resolution of 7 mm nodule previously seen with stable  subcentimeter nodules, no new nodules and no lymphadenopathy.    Past Medical History:   Diagnosis Date   • Cervical spine disease 3/30/2017   • Chronic obstructive pulmonary disease with acute exacerbation (HCC) 3/30/2017   • Dental caries 3/30/2017   • Pneumonia    • Tobacco abuse    • Tonsillitis        Social History     Socioeconomic History   • Marital status: Single     Spouse name: Not on file   • Number of children: Not on file   • Years of education: Not on file   • Highest education level: Not on file   Occupational History   • Not on file   Social Needs   • Financial resource strain: Not on file   • Food insecurity     Worry: Not on file     Inability: Not on file   • Transportation needs     Medical: Not on file     Non-medical: Not on file   Tobacco Use   • Smoking status: Current Every Day Smoker     Packs/day: 0.50     Years: 50.00     Pack years: 25.00     Types: Cigarettes     Start date: 1968   • Smokeless tobacco: Never Used   • Tobacco comment: Started smoking in 1968   Substance and Sexual Activity   • Alcohol use: Yes     Alcohol/week: 14.4 - 21.0 oz     Types: 24 - 35 Cans of beer per week     Frequency: 4 or more times a week     Drinks per session: 1 or 2     Binge frequency: Never     Comment: Average per weekday 20 Average per weekend 6   • Drug use: No   • Sexual activity: Never     Partners: Female     Comment:  5 years ago   Lifestyle   • Physical activity     Days per week: Not on file     Minutes per session: Not on file   • Stress: Not on file   Relationships   • Social connections     Talks on phone: Not on file     Gets together: Not on file     Attends Shinto service: Not on file     Active member of club or organization: Not on file     Attends meetings of clubs or organizations: Not on file     Relationship status: Not on file   • Intimate partner violence     Fear of current or ex partner: Not on file     Emotionally abused: Not on file     Physically abused: Not on  file     Forced sexual activity: Not on file   Other Topics Concern   •  Service No   • Blood Transfusions No   • Caffeine Concern No   • Occupational Exposure No   • Hobby Hazards No   • Sleep Concern No   • Stress Concern No   • Weight Concern Yes     Comment: lost weight    • Special Diet No   • Back Care No   • Exercise Yes   • Bike Helmet No     Comment: does not ride bike   • Seat Belt Yes   • Self-Exams No   Social History Narrative   • Not on file       Family History   Problem Relation Age of Onset   • Cancer Father         lung   • Other Mother         memory loss   • No Known Problems Brother        Current Outpatient Medications on File Prior to Visit   Medication Sig Dispense Refill   • albuterol (VENTOLIN HFA) 108 (90 Base) MCG/ACT Aero Soln inhalation aerosol Inhale 2 Puffs by mouth every 6 hours as needed for Shortness of Breath. 3 Each 3   • budesonide (PULMICORT) 0.25 MG/2ML Suspension 2 mL by Nebulization route 2 times a day. 180 Bullet 3   • Budesonide, Inhalation, 180 MCG/ACT AEROSOL POWDER, BREATH ACTIVATED Inhale 1 Puff by mouth 2 Times a Day. 3 Each 3   • ipratropium-albuterol (DUONEB) 0.5-2.5 (3) MG/3ML nebulizer solution 3 mL by Nebulization route 4 times a day. 90 Bullet 3   • aspirin EC (ECOTRIN) 325 MG Tablet Delayed Response Take 1 Tab by mouth every day. 30 Tab 6     No current facility-administered medications on file prior to visit.        Patient has no known allergies.      ROS:   Review of Systems   Constitutional: Negative for chills, diaphoresis, fever, malaise/fatigue and weight loss.   HENT: Negative for congestion, ear discharge, ear pain, hearing loss, nosebleeds, sinus pain, sore throat and tinnitus.    Eyes: Negative for blurred vision, double vision, photophobia, pain, discharge and redness.   Respiratory: Positive for cough and shortness of breath. Negative for hemoptysis, sputum production, wheezing and stridor.    Cardiovascular: Negative for chest pain,  "palpitations, orthopnea, claudication, leg swelling and PND.   Gastrointestinal: Negative for abdominal pain, constipation, diarrhea, heartburn, nausea and vomiting.   Genitourinary: Negative for dysuria and urgency.   Musculoskeletal: Negative for back pain, falls, joint pain, myalgias and neck pain.   Skin: Negative for itching and rash.   Neurological: Negative for dizziness, tremors, speech change, focal weakness, weakness and headaches.   Endo/Heme/Allergies: Negative for environmental allergies.   Psychiatric/Behavioral: Negative for depression.       /78 (BP Location: Left arm, Patient Position: Sitting, BP Cuff Size: Adult)   Pulse 93   Temp 37.1 °C (98.8 °F) (Temporal)   Resp 16   Ht 1.803 m (5' 11\")   Wt 55.3 kg (122 lb)   SpO2 94%   Physical Exam  Vitals signs reviewed.   Constitutional:       General: He is not in acute distress.     Appearance: Normal appearance. He is ill-appearing.      Comments: Cachectic appearing elderly gentleman   HENT:      Head: Normocephalic and atraumatic.      Right Ear: External ear normal.      Left Ear: External ear normal.      Nose: Nose normal. No congestion.      Mouth/Throat:      Mouth: Mucous membranes are moist.      Pharynx: Oropharynx is clear. No oropharyngeal exudate.   Eyes:      General: No scleral icterus.     Extraocular Movements: Extraocular movements intact.      Conjunctiva/sclera: Conjunctivae normal.      Pupils: Pupils are equal, round, and reactive to light.   Neck:      Musculoskeletal: Normal range of motion and neck supple.   Cardiovascular:      Rate and Rhythm: Normal rate and regular rhythm.      Heart sounds: Normal heart sounds. No murmur. No gallop.    Pulmonary:      Effort: Pulmonary effort is normal. No respiratory distress.      Comments: Decreased air movement throughout with rhonchorous breath sounds left mid lung field that clear with cough  Abdominal:      General: There is no distension.      Palpations: Abdomen is " soft.   Musculoskeletal: Normal range of motion.      Right lower leg: No edema.      Left lower leg: No edema.   Skin:     General: Skin is warm and dry.      Findings: No rash.   Neurological:      Mental Status: He is alert and oriented to person, place, and time.      Cranial Nerves: No cranial nerve deficit.   Psychiatric:         Mood and Affect: Mood normal.         Behavior: Behavior normal.         PFTs as reviewed by me personally: as per HPI    Imaging as reviewed by me personally:  As per hPI    Assessment:  1. Chronic obstructive pulmonary disease with acute exacerbation (HCC)  umeclidinium-vilanterol (ANORO ELLIPTA) 62.5-25 MCG/INH AEROSOL POWDER, BREATH ACTIVATED inhaler    umeclidinium-vilanterol (ANORO ELLIPTA) 62.5-25 MCG/INH AEROSOL POWDER, BREATH ACTIVATED inhaler    Multiple Oximetry    Multiple Oximetry   2. Encounter for screening for malignant neoplasm of respiratory organs  CT-LUNG CANCER-SCREENING   3. Tobacco use  CT-LUNG CANCER-SCREENING   4. Pulmonary nodules  CT-LUNG CANCER-SCREENING   5. Nicotine dependence, cigarettes, uncomplicated   CT-LUNG CANCER-SCREENING   6. Underweight         Plan:  1.  This patient is presenting with severe airflow obstruction in the setting of chronic tobacco use.  I would like to see if we can get Anoro covered given I think this would be a better medication for him than the Pulmicort.  I did advise him that if this is not covered we can try an alternative agent.  I did give him a 2-week sample.  Advised him to continue bronchodilators as needed for airway clearance as well as MDI for shortness of breath when out.  He was counseled on tobacco cessation and is up-to-date on vaccines other than influenza which we did not have to offer today.  I did perform multiox in clinic to evaluate for desaturation which he did not desaturate with ambulation.  2.  This is a duplicate diagnosis to obtain follow-up imaging for pulmonary nodules in a smoker.  3.  Patient is  interested in tobacco cessation and has been prescribed Chantix in the past but is concerned regarding side effects.  We discussed barriers to cessation and possible tobacco cessation clinic.  He would like to continue trying other methods on his own.  4.  Most concerning nodule has resolved on recent CT.  Stable pulmonary nodules 5 mm or smaller with no new nodules and no lymphadenopathy.  Continue annual surveillance CT.  5.  See discussion above.  6.  I suspect this is a consequence of chronic hypoxic lung disease although he did not desaturate on multiox in clinic today.  We can consider overnight oximetry to assess for nocturnal needs and in the meantime try to optimize his COPD therapy.  I do think he would benefit from pulmonary rehab which we will discuss at follow-up.  Return in about 3 months (around 1/22/2021).

## 2020-11-25 ENCOUNTER — HOSPITAL ENCOUNTER (OUTPATIENT)
Dept: LAB | Facility: MEDICAL CENTER | Age: 70
End: 2020-11-25
Attending: INTERNAL MEDICINE
Payer: MEDICARE

## 2020-11-25 DIAGNOSIS — E55.9 VITAMIN D DEFICIENCY: ICD-10-CM

## 2020-11-25 DIAGNOSIS — J44.1 CHRONIC OBSTRUCTIVE PULMONARY DISEASE WITH ACUTE EXACERBATION (HCC): ICD-10-CM

## 2020-11-25 LAB
25(OH)D3 SERPL-MCNC: 38 NG/ML (ref 30–100)
ALBUMIN SERPL BCP-MCNC: 4.1 G/DL (ref 3.2–4.9)
ALBUMIN/GLOB SERPL: 1.5 G/DL
ALP SERPL-CCNC: 90 U/L (ref 30–99)
ALT SERPL-CCNC: 11 U/L (ref 2–50)
ANION GAP SERPL CALC-SCNC: 5 MMOL/L (ref 7–16)
AST SERPL-CCNC: 16 U/L (ref 12–45)
BASOPHILS # BLD AUTO: 0.9 % (ref 0–1.8)
BASOPHILS # BLD: 0.05 K/UL (ref 0–0.12)
BILIRUB SERPL-MCNC: 0.5 MG/DL (ref 0.1–1.5)
BUN SERPL-MCNC: 5 MG/DL (ref 8–22)
CALCIUM SERPL-MCNC: 9.2 MG/DL (ref 8.5–10.5)
CHLORIDE SERPL-SCNC: 96 MMOL/L (ref 96–112)
CO2 SERPL-SCNC: 31 MMOL/L (ref 20–33)
CREAT SERPL-MCNC: 0.56 MG/DL (ref 0.5–1.4)
EOSINOPHIL # BLD AUTO: 0.36 K/UL (ref 0–0.51)
EOSINOPHIL NFR BLD: 6.2 % (ref 0–6.9)
ERYTHROCYTE [DISTWIDTH] IN BLOOD BY AUTOMATED COUNT: 44.5 FL (ref 35.9–50)
FASTING STATUS PATIENT QL REPORTED: NORMAL
GLOBULIN SER CALC-MCNC: 2.7 G/DL (ref 1.9–3.5)
GLUCOSE SERPL-MCNC: 102 MG/DL (ref 65–99)
HCT VFR BLD AUTO: 48.5 % (ref 42–52)
HGB BLD-MCNC: 16.5 G/DL (ref 14–18)
IMM GRANULOCYTES # BLD AUTO: 0.01 K/UL (ref 0–0.11)
IMM GRANULOCYTES NFR BLD AUTO: 0.2 % (ref 0–0.9)
LYMPHOCYTES # BLD AUTO: 1.46 K/UL (ref 1–4.8)
LYMPHOCYTES NFR BLD: 25.3 % (ref 22–41)
MCH RBC QN AUTO: 34.2 PG (ref 27–33)
MCHC RBC AUTO-ENTMCNC: 34 G/DL (ref 33.7–35.3)
MCV RBC AUTO: 100.6 FL (ref 81.4–97.8)
MONOCYTES # BLD AUTO: 0.71 K/UL (ref 0–0.85)
MONOCYTES NFR BLD AUTO: 12.3 % (ref 0–13.4)
NEUTROPHILS # BLD AUTO: 3.19 K/UL (ref 1.82–7.42)
NEUTROPHILS NFR BLD: 55.1 % (ref 44–72)
NRBC # BLD AUTO: 0 K/UL
NRBC BLD-RTO: 0 /100 WBC
PLATELET # BLD AUTO: 293 K/UL (ref 164–446)
PMV BLD AUTO: 11.4 FL (ref 9–12.9)
POTASSIUM SERPL-SCNC: 4.5 MMOL/L (ref 3.6–5.5)
PROT SERPL-MCNC: 6.8 G/DL (ref 6–8.2)
RBC # BLD AUTO: 4.82 M/UL (ref 4.7–6.1)
SODIUM SERPL-SCNC: 132 MMOL/L (ref 135–145)
WBC # BLD AUTO: 5.8 K/UL (ref 4.8–10.8)

## 2020-11-25 PROCEDURE — 80053 COMPREHEN METABOLIC PANEL: CPT

## 2020-11-25 PROCEDURE — 82306 VITAMIN D 25 HYDROXY: CPT

## 2020-11-25 PROCEDURE — 36415 COLL VENOUS BLD VENIPUNCTURE: CPT

## 2020-11-25 PROCEDURE — 85025 COMPLETE CBC W/AUTO DIFF WBC: CPT

## 2020-12-02 ENCOUNTER — OFFICE VISIT (OUTPATIENT)
Dept: MEDICAL GROUP | Facility: PHYSICIAN GROUP | Age: 70
End: 2020-12-02
Payer: MEDICARE

## 2020-12-02 VITALS
BODY MASS INDEX: 16.7 KG/M2 | WEIGHT: 126 LBS | TEMPERATURE: 98.4 F | OXYGEN SATURATION: 94 % | DIASTOLIC BLOOD PRESSURE: 80 MMHG | HEART RATE: 89 BPM | RESPIRATION RATE: 14 BRPM | SYSTOLIC BLOOD PRESSURE: 122 MMHG | HEIGHT: 73 IN

## 2020-12-02 DIAGNOSIS — Z72.0 TOBACCO ABUSE: ICD-10-CM

## 2020-12-02 DIAGNOSIS — E55.9 VITAMIN D DEFICIENCY: ICD-10-CM

## 2020-12-02 DIAGNOSIS — J44.1 CHRONIC OBSTRUCTIVE PULMONARY DISEASE WITH ACUTE EXACERBATION (HCC): ICD-10-CM

## 2020-12-02 DIAGNOSIS — R63.4 WEIGHT LOSS: ICD-10-CM

## 2020-12-02 PROCEDURE — 99214 OFFICE O/P EST MOD 30 MIN: CPT | Performed by: INTERNAL MEDICINE

## 2020-12-02 RX ORDER — VARENICLINE TARTRATE 1 MG/1
1 TABLET, FILM COATED ORAL 2 TIMES DAILY
Qty: 60 TAB | Refills: 3 | Status: SHIPPED | OUTPATIENT
Start: 2021-01-02 | End: 2021-09-08

## 2020-12-02 ASSESSMENT — FIBROSIS 4 INDEX: FIB4 SCORE: 1.14

## 2020-12-02 NOTE — PROGRESS NOTES
Chief Complaint   Patient presents with   • Lab Results       HISTORY OF PRESENT ILLNESS: Patient is a 70 y.o. male established patient who presents today to discuss the medical issues below.    Chronic obstructive pulmonary disease with acute exacerbation (HCC)  Following with pulmonary, severe disease.  Continues on inhalers.  Reports that the most recent inhaler of Anoro has been very helpful.  He has samples through until January when he has an appointment with pulmonary.    Tobacco abuse  Patient continues to smoke estimates a pack a day.  He is interested in medication trial.    Vitamin D deficiency  Patient is taking his vitamin D supplementation had lab work done.    Weight loss  Patient's weight is increased he had labs blood sugar little high.  He admits that he tends to eat a lot of sugar containing lozenges.      Patient Active Problem List    Diagnosis Date Noted   • DNR (do not resuscitate) 11/06/2019     Priority: High   • Chronic obstructive pulmonary disease with acute exacerbation (HCC) 03/30/2017     Priority: High   • Tobacco abuse      Priority: High   • Cervical spine disease 03/30/2017     Priority: Medium   • Weight loss 08/26/2019   • Anxiety 08/26/2019   • Vitamin D deficiency 02/22/2019   • Other hyperlipidemia 10/19/2017   • Colon cancer screening 10/19/2017   • Dental caries 03/30/2017   • Cerebral microvascular disease 03/30/2017       Allergies:Patient has no known allergies.    Current Outpatient Medications   Medication Sig Dispense Refill   • varenicline (CHANTIX STARTING MONTH PAK) 0.5 MG X 11 & 1 MG X 42 tablet Per starter mi 56 Tab 0   • [START ON 1/2/2021] varenicline (CHANTIX CONTINUING MONTH MI) 1 MG tablet Take 1 Tab by mouth 2 times a day. 60 Tab 3   • umeclidinium-vilanterol (ANORO ELLIPTA) 62.5-25 MCG/INH AEROSOL POWDER, BREATH ACTIVATED inhaler Inhale 1 Puff by mouth every day. 3 Each 3   • umeclidinium-vilanterol (ANORO ELLIPTA) 62.5-25 MCG/INH AEROSOL POWDER, BREATH  ACTIVATED inhaler Inhale 1 Puff by mouth every day. 1 Each 0   • albuterol (VENTOLIN HFA) 108 (90 Base) MCG/ACT Aero Soln inhalation aerosol Inhale 2 Puffs by mouth every 6 hours as needed for Shortness of Breath. 3 Each 3   • budesonide (PULMICORT) 0.25 MG/2ML Suspension 2 mL by Nebulization route 2 times a day. 180 Bullet 3   • Budesonide, Inhalation, 180 MCG/ACT AEROSOL POWDER, BREATH ACTIVATED Inhale 1 Puff by mouth 2 Times a Day. 3 Each 3   • ipratropium-albuterol (DUONEB) 0.5-2.5 (3) MG/3ML nebulizer solution 3 mL by Nebulization route 4 times a day. 90 Bullet 3   • aspirin EC (ECOTRIN) 325 MG Tablet Delayed Response Take 1 Tab by mouth every day. 30 Tab 6     No current facility-administered medications for this visit.          Past Medical History:   Diagnosis Date   • Cervical spine disease 3/30/2017   • Chronic obstructive pulmonary disease with acute exacerbation (HCC) 3/30/2017   • Dental caries 3/30/2017   • Pneumonia    • Tobacco abuse    • Tonsillitis        Social History     Tobacco Use   • Smoking status: Current Every Day Smoker     Packs/day: 0.50     Years: 50.00     Pack years: 25.00     Types: Cigarettes     Start date:    • Smokeless tobacco: Never Used   • Tobacco comment: Started smoking in    Substance Use Topics   • Alcohol use: Yes     Alcohol/week: 14.4 - 21.0 oz     Types: 24 - 35 Cans of beer per week     Frequency: 4 or more times a week     Drinks per session: 1 or 2     Binge frequency: Never     Comment: Average per weekday 20 Average per weekend 6   • Drug use: No       Family Status   Relation Name Status   • Fa 52    • Mo 89 Alive   • Bro  Alive     Family History   Problem Relation Age of Onset   • Cancer Father         lung   • Other Mother         memory loss   • No Known Problems Brother        ROS:    Respiratory: Negative for cough, sputum production, shortness of breath or wheezing.    Cardiovascular: Negative for chest pain, palpitations, orthopnea,  "dyspnea with exertion or edema.   Gastrointestinal: Negative for GI upset, nausea, vomiting, abdominal pain, constipation or diarrhea.   Genitourinary: Negative for dysuria, urgency, hesitancy or frequency.       Exam:    /80   Pulse 89   Temp 36.9 °C (98.4 °F) (Temporal)   Resp 14   Ht 1.854 m (6' 1\")   Wt 57.2 kg (126 lb)   SpO2 94%  Body mass index is 16.62 kg/m².  General:  Well nourished, well developed male in NAD.  Pulmonary: Clear to ausculation and percussion.  Normal effort. No rales, rhonchi, or wheezing.  Cardiovascular: Regular rate and rhythm without murmur.   Abdomen: Normal bowel sounds soft and nontender no palpable liver spleen bladder mass.  Extremities: No LE edema noted.  Neuro: Grossly nonfocal.  Psych: Alert and oriented to person, place, and time. Appropriate mood and conversation.    LABS: Results reviewed and discussed with the patient, questions answered.      This dictation was created using voice recognition software. I have made reasonable attempts to correct errors, however, errors of grammar and content may exist.          Assessment/Plan:    1. Chronic obstructive pulmonary disease with acute exacerbation (HCC)  Substantially improved with the new inhaler.  Continuing per pulmonary.  Review of CT with improving nodule    2. Tobacco abuse  Patient is ready to try some medications.  Chantix discussed at length.  Prescription written.  Behavior modification tools also discussed at length.  Early follow-up.    3. Vitamin D deficiency  Vitamin D adequately supplemented reviewed with patient    4. Weight loss  Improving.  Focus on protein snacks discussed    Patient was seen for 25 minutes face to face of which more than 50% of the time was spent in counseling and coordination of care regarding the above problems.         "

## 2020-12-02 NOTE — ASSESSMENT & PLAN NOTE
Patient's weight is increased he had labs blood sugar little high.  He admits that he tends to eat a lot of sugar containing lozenges.

## 2020-12-02 NOTE — ASSESSMENT & PLAN NOTE
Following with pulmonary, severe disease.  Continues on inhalers.  Reports that the most recent inhaler of Anoro has been very helpful.  He has samples through until January when he has an appointment with pulmonary.

## 2021-01-22 ENCOUNTER — OFFICE VISIT (OUTPATIENT)
Dept: SLEEP MEDICINE | Facility: MEDICAL CENTER | Age: 71
End: 2021-01-22
Payer: MEDICARE

## 2021-01-22 VITALS
TEMPERATURE: 98.4 F | HEART RATE: 88 BPM | OXYGEN SATURATION: 94 % | HEIGHT: 73 IN | DIASTOLIC BLOOD PRESSURE: 80 MMHG | RESPIRATION RATE: 16 BRPM | BODY MASS INDEX: 16.7 KG/M2 | SYSTOLIC BLOOD PRESSURE: 122 MMHG | WEIGHT: 126 LBS

## 2021-01-22 DIAGNOSIS — J44.9 CHRONIC OBSTRUCTIVE PULMONARY DISEASE, UNSPECIFIED COPD TYPE (HCC): ICD-10-CM

## 2021-01-22 DIAGNOSIS — Z72.0 TOBACCO USE: ICD-10-CM

## 2021-01-22 DIAGNOSIS — R91.8 PULMONARY NODULES: ICD-10-CM

## 2021-01-22 DIAGNOSIS — R64 CACHEXIA (HCC): ICD-10-CM

## 2021-01-22 PROCEDURE — 99214 OFFICE O/P EST MOD 30 MIN: CPT | Performed by: INTERNAL MEDICINE

## 2021-01-22 ASSESSMENT — ENCOUNTER SYMPTOMS
PHOTOPHOBIA: 0
DIZZINESS: 0
PALPITATIONS: 0
NAUSEA: 0
SORE THROAT: 0
COUGH: 1
FOCAL WEAKNESS: 0
DIAPHORESIS: 0
HEMOPTYSIS: 0
SHORTNESS OF BREATH: 1
ORTHOPNEA: 0
BLURRED VISION: 0
CONSTIPATION: 0
FALLS: 0
EYE PAIN: 0
SPUTUM PRODUCTION: 0
MYALGIAS: 0
DEPRESSION: 0
ABDOMINAL PAIN: 0
PND: 0
SPEECH CHANGE: 0
HEARTBURN: 0
CHILLS: 0
EYE DISCHARGE: 0
DIARRHEA: 0
CLAUDICATION: 0
WHEEZING: 0
WEAKNESS: 0
HEADACHES: 0
TREMORS: 0
SINUS PAIN: 0
DOUBLE VISION: 0
STRIDOR: 0
NECK PAIN: 0
VOMITING: 0
FEVER: 0
BACK PAIN: 0
EYE REDNESS: 0
WEIGHT LOSS: 0

## 2021-01-22 ASSESSMENT — FIBROSIS 4 INDEX: FIB4 SCORE: 1.14

## 2021-01-22 NOTE — PROGRESS NOTES
Chief Complaint   Patient presents with   • COPD     last seen 10/22/2020          HPI: This patient is a 70 y.o. male whom is followed in our clinic for COPD last seen by me on 10/22/20.  The patient is an active tobacco user with greater than 60-pack-year history.  He has been followed in our clinic for pulmonary nodules first noted on CT scan from June 2017 or a 7 mm left lower lobe nodule in addition to several smaller nodules were noted as well as severe emphysema and some parenchymal scarring left lower lobe.  PFTs from our last visit in October showed severe airflow obstruction with an FEV1 of 1.28 L or 37% predicted and an FEV1/FVC ratio of 38.  Total capacity of 117% predicted with severe air trapping and mildly reduced DLCO at 69% predicted. Most recent CT chest on October 20 showed resolution of 7 mm nodule previously seen with stable subcentimeter nodules, no new nodules and no lymphadenopathy. The pt has not had recurrent COPD exacerbations but does have severe dyspnea with any activity. He continues to work full time.  He did suffer from a pneumonia in January 2019 but has not required hospitalization since that time.  He continues to smoke but has cut back significantly.  He was using Pulmicort at her last visit which we changed to Anoro and he does feel this is more helpful.  He continues to need his rescue inhaler 2 times daily typically with activity while at work.  He reports he is sleeping well.  Appetite is improved.  Weight is up 4 pounds today.    Past Medical History:   Diagnosis Date   • Cervical spine disease 3/30/2017   • Chronic obstructive pulmonary disease with acute exacerbation (HCC) 3/30/2017   • Dental caries 3/30/2017   • Pneumonia    • Tobacco abuse    • Tonsillitis        Social History     Socioeconomic History   • Marital status: Single     Spouse name: Not on file   • Number of children: Not on file   • Years of education: Not on file   • Highest education level: Not on file    Occupational History   • Not on file   Social Needs   • Financial resource strain: Not on file   • Food insecurity     Worry: Not on file     Inability: Not on file   • Transportation needs     Medical: Not on file     Non-medical: Not on file   Tobacco Use   • Smoking status: Current Every Day Smoker     Packs/day: 0.50     Years: 50.00     Pack years: 25.00     Types: Cigarettes     Start date: 1968   • Smokeless tobacco: Never Used   • Tobacco comment: Started smoking in 1968   Substance and Sexual Activity   • Alcohol use: Yes     Alcohol/week: 14.4 - 21.0 oz     Types: 24 - 35 Cans of beer per week     Frequency: 4 or more times a week     Drinks per session: 1 or 2     Binge frequency: Never     Comment: Average per weekday 20 Average per weekend 6   • Drug use: No   • Sexual activity: Never     Partners: Female     Comment:  5 years ago   Lifestyle   • Physical activity     Days per week: Not on file     Minutes per session: Not on file   • Stress: Not on file   Relationships   • Social connections     Talks on phone: Not on file     Gets together: Not on file     Attends Rastafarian service: Not on file     Active member of club or organization: Not on file     Attends meetings of clubs or organizations: Not on file     Relationship status: Not on file   • Intimate partner violence     Fear of current or ex partner: Not on file     Emotionally abused: Not on file     Physically abused: Not on file     Forced sexual activity: Not on file   Other Topics Concern   •  Service No   • Blood Transfusions No   • Caffeine Concern No   • Occupational Exposure No   • Hobby Hazards No   • Sleep Concern No   • Stress Concern No   • Weight Concern Yes     Comment: lost weight    • Special Diet No   • Back Care No   • Exercise Yes   • Bike Helmet No     Comment: does not ride bike   • Seat Belt Yes   • Self-Exams No   Social History Narrative   • Not on file       Family History   Problem Relation Age of  Onset   • Cancer Father         lung   • Other Mother         memory loss   • No Known Problems Brother        Current Outpatient Medications on File Prior to Visit   Medication Sig Dispense Refill   • varenicline (CHANTIX STARTING MONTH MI) 0.5 MG X 11 & 1 MG X 42 tablet Per starter mi 56 Tab 0   • varenicline (CHANTIX CONTINUING MONTH MI) 1 MG tablet Take 1 Tab by mouth 2 times a day. 60 Tab 3   • umeclidinium-vilanterol (ANORO ELLIPTA) 62.5-25 MCG/INH AEROSOL POWDER, BREATH ACTIVATED inhaler Inhale 1 Puff by mouth every day. 3 Each 3   • albuterol (VENTOLIN HFA) 108 (90 Base) MCG/ACT Aero Soln inhalation aerosol Inhale 2 Puffs by mouth every 6 hours as needed for Shortness of Breath. 3 Each 3   • budesonide (PULMICORT) 0.25 MG/2ML Suspension 2 mL by Nebulization route 2 times a day. 180 Bullet 3   • Budesonide, Inhalation, 180 MCG/ACT AEROSOL POWDER, BREATH ACTIVATED Inhale 1 Puff by mouth 2 Times a Day. (Patient taking differently: Inhale 1 Puff 2 Times a Day. pulmicort) 3 Each 3   • ipratropium-albuterol (DUONEB) 0.5-2.5 (3) MG/3ML nebulizer solution 3 mL by Nebulization route 4 times a day. 90 Bullet 3   • aspirin EC (ECOTRIN) 325 MG Tablet Delayed Response Take 1 Tab by mouth every day. 30 Tab 6   • umeclidinium-vilanterol (ANORO ELLIPTA) 62.5-25 MCG/INH AEROSOL POWDER, BREATH ACTIVATED inhaler Inhale 1 Puff by mouth every day. (Patient not taking: Reported on 1/22/2021) 1 Each 0     No current facility-administered medications on file prior to visit.        Patient has no known allergies.      ROS:   Review of Systems   Constitutional: Negative for chills, diaphoresis, fever, malaise/fatigue and weight loss.   HENT: Negative for congestion, ear discharge, ear pain, hearing loss, nosebleeds, sinus pain, sore throat and tinnitus.    Eyes: Negative for blurred vision, double vision, photophobia, pain, discharge and redness.   Respiratory: Positive for cough and shortness of breath. Negative for hemoptysis,  "sputum production, wheezing and stridor.    Cardiovascular: Negative for chest pain, palpitations, orthopnea, claudication, leg swelling and PND.   Gastrointestinal: Negative for abdominal pain, constipation, diarrhea, heartburn, nausea and vomiting.   Genitourinary: Negative for dysuria and urgency.   Musculoskeletal: Negative for back pain, falls, joint pain, myalgias and neck pain.   Skin: Negative for itching and rash.   Neurological: Negative for dizziness, tremors, speech change, focal weakness, weakness and headaches.   Endo/Heme/Allergies: Negative for environmental allergies.   Psychiatric/Behavioral: Negative for depression.       /80 (BP Location: Right arm, Patient Position: Sitting, BP Cuff Size: Adult)   Pulse 88   Temp 36.9 °C (98.4 °F) (Temporal)   Resp 16   Ht 1.854 m (6' 1\")   Wt 57.2 kg (126 lb)   SpO2 94%   Physical Exam  Vitals signs reviewed.   Constitutional:       General: He is not in acute distress.     Appearance: Normal appearance. He is ill-appearing.      Comments: Chronically ill appearing, cachectic   HENT:      Head: Normocephalic and atraumatic.      Right Ear: External ear normal.      Left Ear: External ear normal.      Nose: Nose normal. No congestion.      Mouth/Throat:      Mouth: Mucous membranes are moist.      Pharynx: Oropharynx is clear. No oropharyngeal exudate.   Eyes:      General: No scleral icterus.     Extraocular Movements: Extraocular movements intact.      Conjunctiva/sclera: Conjunctivae normal.      Pupils: Pupils are equal, round, and reactive to light.   Pulmonary:      Effort: Pulmonary effort is normal. No respiratory distress.      Breath sounds: No wheezing or rales.   Abdominal:      General: There is no distension.      Palpations: Abdomen is soft.   Neurological:      Mental Status: He is alert.         PFTs as reviewed by me personally: as per hPI    Imaging as reviewed by me personally:  As per hPI    Assessment:  1. Chronic obstructive " pulmonary disease, unspecified COPD type (HCC)  umeclidinium-vilanterol (ANORO ELLIPTA) 62.5-25 MCG/INH AEROSOL POWDER, BREATH ACTIVATED inhaler    umeclidinium-vilanterol (ANORO ELLIPTA) 62.5-25 MCG/INH AEROSOL POWDER, BREATH ACTIVATED inhaler    DISCONTINUED: umeclidinium-vilanterol (ANORO ELLIPTA) 62.5-25 MCG/INH AEROSOL POWDER, BREATH ACTIVATED inhaler   2. Tobacco use     3. Cachexia (HCC)     4. Pulmonary nodules         Plan:  1. Chronic, severe and likely progressive given ongoing tobacco use.  He is fairly symptomatic but has not had recurrent exacerbations.  He is benefiting from Anoro and is able to continue working with this and short acting bronchodilators.  I gave him another sample today.  There was an issue with his most recent prescription as far as cost which I suspect is related to deductible.  He will discuss with pharmacy.  In the meantime we will continue Anoro and short acting bronchodilators.  He is up-to-date on vaccines other than influenza which I do not have to offer him in clinic today.  He was counseled on tobacco cessation.  Follow-up with me in 6 months.  2.  Patient has cut back significantly.  I congratulated him on his efforts.  Surveillance CT chest in October of this year.  Counseled on complete cessation.  3.  This is likely multifactorial but in part related to chronic lung disease.  Encourage small, frequent meals.  4.  Largest, most concerning nodule has resolved based on last CT.  We will continue with annual surveillance imaging.  Return in about 6 months (around 7/22/2021) for copd.

## 2021-03-09 ENCOUNTER — OFFICE VISIT (OUTPATIENT)
Dept: MEDICAL GROUP | Facility: PHYSICIAN GROUP | Age: 71
End: 2021-03-09
Payer: MEDICARE

## 2021-03-09 VITALS
SYSTOLIC BLOOD PRESSURE: 138 MMHG | OXYGEN SATURATION: 95 % | TEMPERATURE: 98.3 F | WEIGHT: 127 LBS | BODY MASS INDEX: 17.2 KG/M2 | HEIGHT: 72 IN | DIASTOLIC BLOOD PRESSURE: 86 MMHG | HEART RATE: 110 BPM

## 2021-03-09 DIAGNOSIS — Z72.0 TOBACCO ABUSE: ICD-10-CM

## 2021-03-09 DIAGNOSIS — E55.9 VITAMIN D DEFICIENCY: ICD-10-CM

## 2021-03-09 DIAGNOSIS — Z23 NEEDS FLU SHOT: ICD-10-CM

## 2021-03-09 DIAGNOSIS — F41.9 ANXIETY: ICD-10-CM

## 2021-03-09 DIAGNOSIS — J44.1 CHRONIC OBSTRUCTIVE PULMONARY DISEASE WITH ACUTE EXACERBATION (HCC): ICD-10-CM

## 2021-03-09 PROCEDURE — G0008 ADMIN INFLUENZA VIRUS VAC: HCPCS | Performed by: INTERNAL MEDICINE

## 2021-03-09 PROCEDURE — 90662 IIV NO PRSV INCREASED AG IM: CPT | Performed by: INTERNAL MEDICINE

## 2021-03-09 PROCEDURE — 99214 OFFICE O/P EST MOD 30 MIN: CPT | Mod: 25 | Performed by: INTERNAL MEDICINE

## 2021-03-09 ASSESSMENT — FIBROSIS 4 INDEX: FIB4 SCORE: 1.14

## 2021-03-09 ASSESSMENT — PATIENT HEALTH QUESTIONNAIRE - PHQ9: CLINICAL INTERPRETATION OF PHQ2 SCORE: 0

## 2021-03-09 NOTE — ASSESSMENT & PLAN NOTE
Patient reports he continues to work on cutting back.  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  He tends to let this burn out.  He is using the chantix at 1 tab a day.

## 2021-03-09 NOTE — ASSESSMENT & PLAN NOTE
Patient reports his anxiety is much improved, he is taking the venlafaxine.  He is at 1 mg once a day.

## 2021-03-09 NOTE — PROGRESS NOTES
Chief Complaint   Patient presents with   • Follow-Up       HISTORY OF PRESENT ILLNESS: Patient is a 70 y.o. male established patient who presents today to discuss the medical issues below.    Tobacco abuse  Patient reports he continues to work on cutting back.  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  He tends to let this burn out.  He is using the chantix at 1 tab a day.      Chronic obstructive pulmonary disease with acute exacerbation (HCC)  Patient has been seen by pulmonary doing much better with the Anoro.  He does have his Pulmicort inhaler as well.  He utilizes this rarely if he is having more trouble with breathing.  He is utilizing his albuterol approximately 2 times a day.  He is cutting back on cigarettes.    Anxiety  Patient reports his anxiety is much improved, he is taking the venlafaxine.  He is at 1 mg once a day.      Patient Active Problem List    Diagnosis Date Noted   • DNR (do not resuscitate) 11/06/2019   • Chronic obstructive pulmonary disease with acute exacerbation (HCC) 03/30/2017   • Tobacco abuse    • Cervical spine disease 03/30/2017   • Weight loss 08/26/2019   • Anxiety 08/26/2019   • Vitamin D deficiency 02/22/2019   • Other hyperlipidemia 10/19/2017   • Colon cancer screening 10/19/2017   • Dental caries 03/30/2017   • Cerebral microvascular disease 03/30/2017       Allergies:Patient has no known allergies.    Current Outpatient Medications   Medication Sig Dispense Refill   • albuterol (VENTOLIN HFA) 108 (90 Base) MCG/ACT Aero Soln inhalation aerosol Inhale 2 Puffs by mouth every 6 hours as needed for Shortness of Breath. 3 Each 3   • Budesonide, Inhalation, 180 MCG/ACT AEROSOL POWDER, BREATH ACTIVATED Inhale 1 Puff by mouth 2 Times a Day. (Patient taking differently: Inhale 1 Puff 2 Times a Day. pulmicort) 3 Each 3   • aspirin EC (ECOTRIN) 325 MG Tablet Delayed Response Take 1 Tab by mouth every day. 30 Tab 6   • varenicline  (CHANTIX CONTINUING MONTH MI) 1 MG tablet Take 1 Tab by mouth 2 times a day. (Patient not taking: Reported on 3/9/2021) 60 Tab 3   • umeclidinium-vilanterol (ANORO ELLIPTA) 62.5-25 MCG/INH AEROSOL POWDER, BREATH ACTIVATED inhaler Inhale 1 Puff by mouth every day. 3 Each 3   • budesonide (PULMICORT) 0.25 MG/2ML Suspension 2 mL by Nebulization route 2 times a day. 180 Bullet 3   • ipratropium-albuterol (DUONEB) 0.5-2.5 (3) MG/3ML nebulizer solution 3 mL by Nebulization route 4 times a day. 90 Bullet 3     No current facility-administered medications for this visit.         Past Medical History:   Diagnosis Date   • Cervical spine disease 3/30/2017   • Chronic obstructive pulmonary disease with acute exacerbation (HCC) 3/30/2017   • Dental caries 3/30/2017   • Pneumonia    • Tobacco abuse    • Tonsillitis        Social History     Tobacco Use   • Smoking status: Current Every Day Smoker     Packs/day: 0.50     Years: 50.00     Pack years: 25.00     Types: Cigarettes     Start date:    • Smokeless tobacco: Never Used   • Tobacco comment: Started smoking in    Substance Use Topics   • Alcohol use: Yes     Alcohol/week: 14.4 - 21.0 oz     Types: 24 - 35 Cans of beer per week     Comment: Average per weekday 20 Average per weekend 6   • Drug use: No       Family Status   Relation Name Status   • Fa 52    • Mo 89 Alive   • Bro  Alive     Family History   Problem Relation Age of Onset   • Cancer Father         lung   • Other Mother         memory loss   • No Known Problems Brother        ROS:    Respiratory: Negative for cough, sputum production, shortness of breath or wheezing.    Cardiovascular: Negative for chest pain, palpitations, orthopnea, dyspnea with exertion or edema.   Gastrointestinal: Negative for GI upset, nausea, vomiting, abdominal pain, constipation or diarrhea.   Genitourinary: Negative for dysuria, urgency, hesitancy or frequency.       Exam:    /86 (BP Location: Left arm, Patient  Position: Sitting, BP Cuff Size: Small adult)   Pulse (!) 110   Temp 36.8 °C (98.3 °F) (Temporal)   Ht 1.829 m (6')   Wt 57.6 kg (127 lb)   SpO2 95%  Body mass index is 17.22 kg/m².  General:  Well nourished, well developed male in NAD.  HENT: Normocephalic, bilateral TMs are intact, nasal and oral mucosa with no lesions,   Neck: Supple without bruit. Thyroid is not enlarged.  Pulmonary: Decreased breath sounds bilateral bases vesicular quality to breath sounds otherwise.  Moderate air motion.  No wheezes or rales  Cardiovascular: Regular rate and rhythm without murmur.   Abdomen: Normal bowel sounds soft and nontender no palpable liver spleen bladder mass.  Extremities: No LE edema noted.  Neuro: Grossly nonfocal.  Psych: Alert and oriented to person, place, and time. Appropriate mood and conversation.        This dictation was created using voice recognition software. I have made reasonable attempts to correct errors, however, errors of grammar and content may exist.          Assessment/Plan:    1. Tobacco abuse  Further encouragement given.  He is continuing on venlafaxine.  Likely this is contributing to underlying anxiety.  Consider switching to venlafaxine if insurance issues.  Otherwise clinically stable    2. Chronic obstructive pulmonary disease with acute exacerbation (HCC)  Per pulmonary doing well.  Suggested utilizing the budesonide after the Anoro and see if he can skip the albuterol altogether.  - Comp Metabolic Panel; Future  - CBC WITH DIFFERENTIAL; Future    3. Vitamin D deficiency  Currently on no supplementation however, his anorexia is improving substantially with improvement in COPD    4. Needs flu shot    - INFLUENZA VACCINE, HIGH DOSE (65+ ONLY)    5. Anxiety  Substantially improved with Chantix.  As discussed above may benefit from venlafaxine long-term use.       Patient was seen for 25 minutes face to face of which more than 50% of the time was spent in counseling and coordination of  care regarding the above problems.

## 2021-03-09 NOTE — ASSESSMENT & PLAN NOTE
Patient has been seen by pulmonary doing much better with the Anoro.  He does have his Pulmicort inhaler as well.  He utilizes this rarely if he is having more trouble with breathing.  He is utilizing his albuterol approximately 2 times a day.  He is cutting back on cigarettes.

## 2021-07-22 ENCOUNTER — SLEEP CENTER VISIT (OUTPATIENT)
Dept: SLEEP MEDICINE | Facility: MEDICAL CENTER | Age: 71
End: 2021-07-22
Payer: MEDICARE

## 2021-07-22 VITALS
OXYGEN SATURATION: 94 % | HEART RATE: 104 BPM | RESPIRATION RATE: 16 BRPM | WEIGHT: 126 LBS | DIASTOLIC BLOOD PRESSURE: 70 MMHG | TEMPERATURE: 98.2 F | SYSTOLIC BLOOD PRESSURE: 132 MMHG | HEIGHT: 73 IN | BODY MASS INDEX: 16.7 KG/M2

## 2021-07-22 DIAGNOSIS — J44.9 CHRONIC OBSTRUCTIVE PULMONARY DISEASE, UNSPECIFIED COPD TYPE (HCC): ICD-10-CM

## 2021-07-22 DIAGNOSIS — Z72.0 TOBACCO USE: ICD-10-CM

## 2021-07-22 DIAGNOSIS — R91.8 PULMONARY NODULES: ICD-10-CM

## 2021-07-22 DIAGNOSIS — J44.1 CHRONIC OBSTRUCTIVE PULMONARY DISEASE WITH ACUTE EXACERBATION (HCC): ICD-10-CM

## 2021-07-22 DIAGNOSIS — R64 CACHEXIA (HCC): ICD-10-CM

## 2021-07-22 PROCEDURE — 99213 OFFICE O/P EST LOW 20 MIN: CPT | Performed by: INTERNAL MEDICINE

## 2021-07-22 RX ORDER — ALBUTEROL SULFATE 90 UG/1
2 AEROSOL, METERED RESPIRATORY (INHALATION) EVERY 6 HOURS PRN
Qty: 3 EACH | Refills: 3 | Status: SHIPPED | OUTPATIENT
Start: 2021-07-22

## 2021-07-22 ASSESSMENT — ENCOUNTER SYMPTOMS
TREMORS: 0
SPUTUM PRODUCTION: 0
ABDOMINAL PAIN: 0
NECK PAIN: 0
BACK PAIN: 0
WEAKNESS: 0
FEVER: 0
STRIDOR: 0
EYE PAIN: 0
MYALGIAS: 0
EYE REDNESS: 0
DIARRHEA: 0
PHOTOPHOBIA: 0
WEIGHT LOSS: 0
HEMOPTYSIS: 0
DEPRESSION: 0
PND: 0
WHEEZING: 0
HEARTBURN: 0
COUGH: 0
SHORTNESS OF BREATH: 0
CLAUDICATION: 0
DIAPHORESIS: 0
CHILLS: 0
EYE DISCHARGE: 0
SPEECH CHANGE: 0
DIZZINESS: 0
FOCAL WEAKNESS: 0
ORTHOPNEA: 0
CONSTIPATION: 0
BLURRED VISION: 0
VOMITING: 0
DOUBLE VISION: 0
SINUS PAIN: 0
HEADACHES: 0
SORE THROAT: 0
FALLS: 0
PALPITATIONS: 0
NAUSEA: 0

## 2021-07-22 ASSESSMENT — FIBROSIS 4 INDEX: FIB4 SCORE: 1.14

## 2021-07-22 NOTE — PROGRESS NOTES
Chief Complaint   Patient presents with   • COPD     last seen 1/22/21          HPI: This patient is a 70 y.o. male whom is followed in our clinic for copd last seen by me on 1/22/21.The patient is an active tobacco user with greater than 60-pack-year history.  He is currently smoking roughly 1/2 pack/day down from 2 to 3 packs/day.  He has been followed in our clinic for pulmonary nodules first noted on CT scan from June 2017 or a 7 mm left lower lobe nodule in addition to several smaller nodules were noted as well as severe emphysema and some parenchymal scarring left lower lobe.   Most recent CT from October of last year showed resolution of 7 mm nodule previously seen with stable subcentimeter nodules and no new nodules or lymphadenopathy.  Pulmonary function testing from October showed severe airflow obstruction with FEV1 of 1.28 L or 37% predicted, FEV1/FVC ratio of 38, total lung capacity 117% predicted and severe air trapping with mildly reduced DLCO at 69% predicted.  Patient's current COPD regimen includes Anoro and short acting bronchodilators.  He does have nebulized bronchodilators and has used Pulmicort in the past but is not currently using this.  He feels the Anoro was the most helpful.  He is underweight but weight is stable since her last clinic visit.  He lives near his daughter who is eating with him on a regular basis.  No exacerbations since her last clinic visit.  He has not yet received Covid vaccination but is pending this.    Past Medical History:   Diagnosis Date   • Cervical spine disease 3/30/2017   • Chronic obstructive pulmonary disease with acute exacerbation (HCC) 3/30/2017   • Dental caries 3/30/2017   • Pneumonia    • Tobacco abuse    • Tonsillitis        Social History     Socioeconomic History   • Marital status: Single     Spouse name: Not on file   • Number of children: Not on file   • Years of education: Not on file   • Highest education level: Not on file   Occupational  History   • Not on file   Tobacco Use   • Smoking status: Current Every Day Smoker     Packs/day: 0.50     Years: 50.00     Pack years: 25.00     Types: Cigarettes     Start date: 1968   • Smokeless tobacco: Never Used   • Tobacco comment: Started smoking in 1968   Vaping Use   • Vaping Use: Never used   Substance and Sexual Activity   • Alcohol use: Yes     Alcohol/week: 14.4 - 21.0 oz     Types: 24 - 35 Cans of beer per week     Comment: Average per weekday 20 Average per weekend 6   • Drug use: No   • Sexual activity: Never     Partners: Female     Comment:  5 years ago   Other Topics Concern   •  Service No   • Blood Transfusions No   • Caffeine Concern No   • Occupational Exposure No   • Hobby Hazards No   • Sleep Concern No   • Stress Concern No   • Weight Concern Yes     Comment: lost weight    • Special Diet No   • Back Care No   • Exercise Yes   • Bike Helmet No     Comment: does not ride bike   • Seat Belt Yes   • Self-Exams No   Social History Narrative   • Not on file     Social Determinants of Health     Financial Resource Strain:    • Difficulty of Paying Living Expenses:    Food Insecurity:    • Worried About Running Out of Food in the Last Year:    • Ran Out of Food in the Last Year:    Transportation Needs:    • Lack of Transportation (Medical):    • Lack of Transportation (Non-Medical):    Physical Activity:    • Days of Exercise per Week:    • Minutes of Exercise per Session:    Stress:    • Feeling of Stress :    Social Connections:    • Frequency of Communication with Friends and Family:    • Frequency of Social Gatherings with Friends and Family:    • Attends Church Services:    • Active Member of Clubs or Organizations:    • Attends Club or Organization Meetings:    • Marital Status:    Intimate Partner Violence:    • Fear of Current or Ex-Partner:    • Emotionally Abused:    • Physically Abused:    • Sexually Abused:        Family History   Problem Relation Age of Onset   •  Cancer Father         lung   • Other Mother         memory loss   • No Known Problems Brother        Current Outpatient Medications on File Prior to Visit   Medication Sig Dispense Refill   • aspirin EC (ECOTRIN) 325 MG Tablet Delayed Response Take 1 Tab by mouth every day. 30 Tab 6   • varenicline (CHANTIX CONTINUING MONTH MI) 1 MG tablet Take 1 Tab by mouth 2 times a day. (Patient not taking: Reported on 3/9/2021) 60 Tab 3   • budesonide (PULMICORT) 0.25 MG/2ML Suspension 2 mL by Nebulization route 2 times a day. 180 Bullet 3   • Budesonide, Inhalation, 180 MCG/ACT AEROSOL POWDER, BREATH ACTIVATED Inhale 1 Puff by mouth 2 Times a Day. (Patient taking differently: Inhale 1 Puff 2 Times a Day. pulmicort) 3 Each 3   • ipratropium-albuterol (DUONEB) 0.5-2.5 (3) MG/3ML nebulizer solution 3 mL by Nebulization route 4 times a day. 90 Bullet 3     No current facility-administered medications on file prior to visit.       Patient has no known allergies.      ROS:   Review of Systems   Constitutional: Negative for chills, diaphoresis, fever, malaise/fatigue and weight loss.   HENT: Negative for congestion, ear discharge, ear pain, hearing loss, nosebleeds, sinus pain, sore throat and tinnitus.    Eyes: Negative for blurred vision, double vision, photophobia, pain, discharge and redness.   Respiratory: Negative for cough, hemoptysis, sputum production, shortness of breath, wheezing and stridor.    Cardiovascular: Negative for chest pain, palpitations, orthopnea, claudication, leg swelling and PND.   Gastrointestinal: Negative for abdominal pain, constipation, diarrhea, heartburn, nausea and vomiting.   Genitourinary: Negative for dysuria and urgency.   Musculoskeletal: Negative for back pain, falls, joint pain, myalgias and neck pain.   Skin: Negative for itching and rash.   Neurological: Negative for dizziness, tremors, speech change, focal weakness, weakness and headaches.   Endo/Heme/Allergies: Negative for environmental  "allergies.   Psychiatric/Behavioral: Negative for depression.       /70 (BP Location: Right arm, Patient Position: Sitting, BP Cuff Size: Small adult)   Pulse (!) 104   Temp 36.8 °C (98.2 °F) (Temporal)   Resp 16   Ht 1.854 m (6' 1\")   Wt 57.2 kg (126 lb)   SpO2 94%   Physical Exam  Vitals reviewed.   Constitutional:       General: He is not in acute distress.     Appearance: Normal appearance.      Comments: underweight   HENT:      Head: Normocephalic and atraumatic.      Right Ear: External ear normal.      Left Ear: External ear normal.      Nose: Nose normal. No congestion.      Mouth/Throat:      Mouth: Mucous membranes are moist.      Pharynx: Oropharynx is clear. No oropharyngeal exudate.   Eyes:      General: No scleral icterus.     Extraocular Movements: Extraocular movements intact.      Conjunctiva/sclera: Conjunctivae normal.      Pupils: Pupils are equal, round, and reactive to light.   Cardiovascular:      Rate and Rhythm: Normal rate and regular rhythm.      Heart sounds: Normal heart sounds. No murmur heard.   No gallop.    Pulmonary:      Effort: Pulmonary effort is normal. No respiratory distress.      Breath sounds: No wheezing or rales.      Comments: Decreased air movement throoughtout  Abdominal:      General: There is no distension.      Palpations: Abdomen is soft.   Musculoskeletal:         General: Normal range of motion.      Cervical back: Normal range of motion and neck supple.      Right lower leg: No edema.      Left lower leg: No edema.   Skin:     General: Skin is warm and dry.      Findings: No rash.   Neurological:      Mental Status: He is alert and oriented to person, place, and time.      Cranial Nerves: No cranial nerve deficit.   Psychiatric:         Mood and Affect: Mood normal.         Behavior: Behavior normal.         PFTs as reviewed by me personally: As per HPI    Imagaing as reviewed by me personally: As per HPI    Assessment:  1. Chronic obstructive " pulmonary disease with acute exacerbation (HCC)  umeclidinium-vilanterol (ANORO ELLIPTA) 62.5-25 MCG/INH AEROSOL POWDER, BREATH ACTIVATED inhaler    umeclidinium-vilanterol (ANORO ELLIPTA) 62.5-25 MCG/INH AEROSOL POWDER, BREATH ACTIVATED inhaler    PULMONARY FUNCTION TESTS -Test requested: Complete Pulmonary Function Test   2. Cachexia (HCC)     3. Pulmonary nodules     4. Tobacco use     5. Chronic obstructive pulmonary disease, unspecified COPD type (HCC)  albuterol (VENTOLIN HFA) 108 (90 Base) MCG/ACT Aero Soln inhalation aerosol       Plan:  1.  Chronic, severe and symptomatically stable but likely progressive given ongoing tobacco use.  We will continue Anoro and short acting bronchodilators.  We will update PFTs at our next clinic visit with CT scan.  He was counseled on tobacco cessation.  He is up-to-date on vaccines except for Covid which I encouraged him to get.  2.  Weight is stable since her last clinic visit.  Overall this is a poor prognostic sign for the patient in general.  He tells me after the death of his wife several years ago he had decreased appetite and the majority of his weight loss has been since then.  Encourage small frequent meals.  3.  Most concerning nodule had actually resolved based on last CT from October.  Routine surveillance CT in this upcoming October.  4.  Patient was counseled on tobacco cessation.  We are monitoring CT chest as per above.  5.  Duplicate diagnosis.  Please see above.  Return in about 6 months (around 1/22/2022) for CT chest and PFTs.

## 2021-09-01 ENCOUNTER — HOSPITAL ENCOUNTER (OUTPATIENT)
Dept: LAB | Facility: MEDICAL CENTER | Age: 71
End: 2021-09-01
Attending: INTERNAL MEDICINE
Payer: MEDICARE

## 2021-09-01 DIAGNOSIS — J44.1 CHRONIC OBSTRUCTIVE PULMONARY DISEASE WITH ACUTE EXACERBATION (HCC): ICD-10-CM

## 2021-09-01 LAB
ALBUMIN SERPL BCP-MCNC: 4.2 G/DL (ref 3.2–4.9)
ALBUMIN/GLOB SERPL: 1.4 G/DL
ALP SERPL-CCNC: 102 U/L (ref 30–99)
ALT SERPL-CCNC: 16 U/L (ref 2–50)
ANION GAP SERPL CALC-SCNC: 8 MMOL/L (ref 7–16)
AST SERPL-CCNC: 26 U/L (ref 12–45)
BASOPHILS # BLD AUTO: 0.8 % (ref 0–1.8)
BASOPHILS # BLD: 0.05 K/UL (ref 0–0.12)
BILIRUB SERPL-MCNC: 0.6 MG/DL (ref 0.1–1.5)
BUN SERPL-MCNC: 4 MG/DL (ref 8–22)
CALCIUM SERPL-MCNC: 9.5 MG/DL (ref 8.5–10.5)
CHLORIDE SERPL-SCNC: 95 MMOL/L (ref 96–112)
CO2 SERPL-SCNC: 29 MMOL/L (ref 20–33)
CREAT SERPL-MCNC: 0.56 MG/DL (ref 0.5–1.4)
EOSINOPHIL # BLD AUTO: 0.28 K/UL (ref 0–0.51)
EOSINOPHIL NFR BLD: 4.4 % (ref 0–6.9)
ERYTHROCYTE [DISTWIDTH] IN BLOOD BY AUTOMATED COUNT: 44.4 FL (ref 35.9–50)
FASTING STATUS PATIENT QL REPORTED: NORMAL
GLOBULIN SER CALC-MCNC: 3 G/DL (ref 1.9–3.5)
GLUCOSE SERPL-MCNC: 96 MG/DL (ref 65–99)
HCT VFR BLD AUTO: 48.4 % (ref 42–52)
HGB BLD-MCNC: 16.4 G/DL (ref 14–18)
IMM GRANULOCYTES # BLD AUTO: 0.02 K/UL (ref 0–0.11)
IMM GRANULOCYTES NFR BLD AUTO: 0.3 % (ref 0–0.9)
LYMPHOCYTES # BLD AUTO: 1.47 K/UL (ref 1–4.8)
LYMPHOCYTES NFR BLD: 23.3 % (ref 22–41)
MCH RBC QN AUTO: 34.5 PG (ref 27–33)
MCHC RBC AUTO-ENTMCNC: 33.9 G/DL (ref 33.7–35.3)
MCV RBC AUTO: 101.7 FL (ref 81.4–97.8)
MONOCYTES # BLD AUTO: 0.71 K/UL (ref 0–0.85)
MONOCYTES NFR BLD AUTO: 11.3 % (ref 0–13.4)
NEUTROPHILS # BLD AUTO: 3.77 K/UL (ref 1.82–7.42)
NEUTROPHILS NFR BLD: 59.9 % (ref 44–72)
NRBC # BLD AUTO: 0 K/UL
NRBC BLD-RTO: 0 /100 WBC
PLATELET # BLD AUTO: 316 K/UL (ref 164–446)
PMV BLD AUTO: 11.4 FL (ref 9–12.9)
POTASSIUM SERPL-SCNC: 4.8 MMOL/L (ref 3.6–5.5)
PROT SERPL-MCNC: 7.2 G/DL (ref 6–8.2)
RBC # BLD AUTO: 4.76 M/UL (ref 4.7–6.1)
SODIUM SERPL-SCNC: 132 MMOL/L (ref 135–145)
WBC # BLD AUTO: 6.3 K/UL (ref 4.8–10.8)

## 2021-09-01 PROCEDURE — 85025 COMPLETE CBC W/AUTO DIFF WBC: CPT

## 2021-09-01 PROCEDURE — 36415 COLL VENOUS BLD VENIPUNCTURE: CPT

## 2021-09-01 PROCEDURE — 80053 COMPREHEN METABOLIC PANEL: CPT

## 2021-09-08 ENCOUNTER — OFFICE VISIT (OUTPATIENT)
Dept: MEDICAL GROUP | Facility: PHYSICIAN GROUP | Age: 71
End: 2021-09-08
Payer: MEDICARE

## 2021-09-08 VITALS
BODY MASS INDEX: 17.74 KG/M2 | RESPIRATION RATE: 16 BRPM | TEMPERATURE: 98.1 F | HEIGHT: 72 IN | HEART RATE: 90 BPM | WEIGHT: 131 LBS | DIASTOLIC BLOOD PRESSURE: 88 MMHG | OXYGEN SATURATION: 95 % | SYSTOLIC BLOOD PRESSURE: 140 MMHG

## 2021-09-08 DIAGNOSIS — F41.9 ANXIETY: ICD-10-CM

## 2021-09-08 DIAGNOSIS — Z12.11 COLON CANCER SCREENING: ICD-10-CM

## 2021-09-08 DIAGNOSIS — Z12.11 SCREEN FOR COLON CANCER: ICD-10-CM

## 2021-09-08 DIAGNOSIS — E78.49 OTHER HYPERLIPIDEMIA: ICD-10-CM

## 2021-09-08 DIAGNOSIS — J44.1 CHRONIC OBSTRUCTIVE PULMONARY DISEASE WITH ACUTE EXACERBATION (HCC): ICD-10-CM

## 2021-09-08 DIAGNOSIS — Z72.0 TOBACCO ABUSE: ICD-10-CM

## 2021-09-08 PROCEDURE — 99214 OFFICE O/P EST MOD 30 MIN: CPT | Performed by: INTERNAL MEDICINE

## 2021-09-08 RX ORDER — VENLAFAXINE HYDROCHLORIDE 37.5 MG/1
37.5 CAPSULE, EXTENDED RELEASE ORAL DAILY
Qty: 30 CAPSULE | Refills: 3 | Status: SHIPPED | OUTPATIENT
Start: 2021-09-08 | End: 2023-10-30

## 2021-09-08 ASSESSMENT — FIBROSIS 4 INDEX: FIB4 SCORE: 1.44

## 2021-09-08 NOTE — PROGRESS NOTES
Chief Complaint   Patient presents with   • Follow-Up       HISTORY OF PRESENT ILLNESS: Patient is a 71 y.o. male established patient who presents today to discuss the medical issues below.    Chronic obstructive pulmonary disease with acute exacerbation (HCC)  Patient reports he runs out of breath but is at baseline. Continues with pulmonary and states he is using Anoro, budesonide Smokes 1 ppd again due to stress.  He uses his mdi albuterol about 3 x a day.       Anxiety  Lots of stress with supply failure at the job.      Other hyperlipidemia  Diet controlled.  Had labs.      Colon cancer screening  Agrees to [facility].     Tobacco abuse  Exacerbation with stress and completion of the varenicline.        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    Current Outpatient Medications   Medication Sig Dispense Refill   • venlafaxine XR (EFFEXOR XR) 37.5 MG CAPSULE SR 24 HR Take 1 Capsule by mouth every day. 30 Capsule 3   • umeclidinium-vilanterol (ANORO ELLIPTA) 62.5-25 MCG/INH AEROSOL POWDER, BREATH ACTIVATED inhaler Inhale 1 Puff every day. 3 Each 3   • albuterol (VENTOLIN HFA) 108 (90 Base) MCG/ACT Aero Soln inhalation aerosol Inhale 2 Puffs every 6 hours as needed for Shortness of Breath. 3 Each 3   • budesonide (PULMICORT) 0.25 MG/2ML Suspension 2 mL by Nebulization route 2 times a day. 180 Bullet 3   • ipratropium-albuterol (DUONEB) 0.5-2.5 (3) MG/3ML nebulizer solution 3 mL by Nebulization route 4 times a day. 90 Bullet 3   • aspirin EC  (ECOTRIN) 325 MG Tablet Delayed Response Take 1 Tab by mouth every day. 30 Tab 6     No current facility-administered medications for this visit.         Past Medical History:   Diagnosis Date   • Cervical spine disease 3/30/2017   • Chronic obstructive pulmonary disease with acute exacerbation (HCC) 3/30/2017   • Dental caries 3/30/2017   • Pneumonia    • Tobacco abuse    • Tonsillitis        Social History     Tobacco Use   • Smoking status: Current Every Day Smoker     Packs/day: 0.50     Years: 50.00     Pack years: 25.00     Types: Cigarettes     Start date:    • Smokeless tobacco: Never Used   • Tobacco comment: Started smoking in    Vaping Use   • Vaping Use: Never used   Substance Use Topics   • Alcohol use: Yes     Alcohol/week: 14.4 - 21.0 oz     Types: 24 - 35 Cans of beer per week     Comment: Average per weekday 20 Average per weekend 6   • Drug use: No       Family Status   Relation Name Status   • Fa 52    • Mo 89    • Bro  Alive     Family History   Problem Relation Age of Onset   • Cancer Father         lung   • Other Mother         memory loss   • No Known Problems Brother        ROS:    Respiratory: Negative for cough, sputum production, shortness of breath or wheezing.    Cardiovascular: Negative for chest pain, palpitations, orthopnea, or edema.   Gastrointestinal: Negative for GI upset, nausea, vomiting, abdominal pain, constipation or diarrhea.   Genitourinary: Negative for dysuria, urgency, hesitancy or frequency.       Exam:    /88   Pulse 90   Temp 36.7 °C (98.1 °F) (Temporal)   Resp 16   Ht 1.829 m (6')   Wt 59.4 kg (131 lb)   SpO2 95%  Body mass index is 17.77 kg/m².  General:  Well nourished, well developed male in NAD.  Pulmonary: Clear to ausculation and percussion.  Normal effort. No rales, rhonchi, or wheezing.  Cardiovascular: Regular rate and rhythm without murmur.   Abdomen: Normal bowel sounds soft and nontender no palpable liver spleen bladder  mass.  Extremities: No LE edema noted.  Neuro: Grossly nonfocal.  Psych: Alert and oriented to person, place, and time. Appropriate mood and conversation.    LABS: Results reviewed and discussed with the patient, questions answered.    This dictation was created using voice recognition software. I have made reasonable attempts to correct errors, however, errors of grammar and content may exist.          Assessment/Plan:    1. Screen for colon cancer  - COLOGUARD (FIT DNA)    2. Chronic obstructive pulmonary disease with acute exacerbation (HCC)  At baseline with current regime.  Discussed utilizing nebulized bronchodilators as needed increase of the MDI beyond 3 a day.  Attention to smoking as below    3. Anxiety  Prominent component.  Discussed support given.  He did much better while he was on the Chantix.  He agrees to start low-dose venlafaxine.  3-month follow-up or earlier as needed    4. Other hyperlipidemia  Excellent control with diet    5. Colon cancer screening  As above    6. Tobacco abuse  Support given as above monitor with venlafaxine chronic therapy       Patient was seen for 25 minutes face to face of which more than 50% of the time was spent in counseling and coordination of care regarding the above problems.

## 2021-09-08 NOTE — ASSESSMENT & PLAN NOTE
Patient reports he runs out of breath but is at baseline. Continues with pulmonary and states he is using Anoro, budesonide Smokes 1 ppd again due to stress.  He uses his mdi albuterol about 3 x a day.

## 2021-10-07 ENCOUNTER — OFFICE VISIT (OUTPATIENT)
Dept: URGENT CARE | Facility: PHYSICIAN GROUP | Age: 71
End: 2021-10-07
Payer: MEDICARE

## 2021-10-07 VITALS
SYSTOLIC BLOOD PRESSURE: 124 MMHG | TEMPERATURE: 98.4 F | DIASTOLIC BLOOD PRESSURE: 82 MMHG | HEART RATE: 100 BPM | RESPIRATION RATE: 14 BRPM | WEIGHT: 150 LBS | BODY MASS INDEX: 19.88 KG/M2 | HEIGHT: 73 IN | OXYGEN SATURATION: 92 %

## 2021-10-07 DIAGNOSIS — R05.9 COUGH: ICD-10-CM

## 2021-10-07 DIAGNOSIS — J44.1 COPD EXACERBATION (HCC): ICD-10-CM

## 2021-10-07 PROCEDURE — 99214 OFFICE O/P EST MOD 30 MIN: CPT | Performed by: FAMILY MEDICINE

## 2021-10-07 RX ORDER — PREDNISONE 20 MG/1
40 TABLET ORAL DAILY
Qty: 10 TABLET | Refills: 0 | Status: SHIPPED | OUTPATIENT
Start: 2021-10-07 | End: 2021-10-12

## 2021-10-07 RX ORDER — BENZONATATE 200 MG/1
200 CAPSULE ORAL 3 TIMES DAILY PRN
Qty: 30 CAPSULE | Refills: 0 | Status: SHIPPED | OUTPATIENT
Start: 2021-10-07 | End: 2023-10-30

## 2021-10-07 RX ORDER — DOXYCYCLINE HYCLATE 100 MG
100 TABLET ORAL 2 TIMES DAILY
Qty: 14 TABLET | Refills: 0 | Status: SHIPPED | OUTPATIENT
Start: 2021-10-07 | End: 2021-10-14

## 2021-10-07 ASSESSMENT — ENCOUNTER SYMPTOMS
WEIGHT LOSS: 0
NAUSEA: 0
EYE REDNESS: 0
MYALGIAS: 0
VOMITING: 0
EYE DISCHARGE: 0

## 2021-10-07 ASSESSMENT — FIBROSIS 4 INDEX: FIB4 SCORE: 1.44

## 2021-10-07 NOTE — PROGRESS NOTES
"Mirtha Morris is a 71 y.o. male who presents with Shortness of Breath (x 2 weeks)            2 weeks productive cough without blood in sputum. SOB/wheeze. +PMH COPD and pneumonia. No fever/chills. +fatigue. No myalgia. No HA. No loss of taste or smell. C19 vaccinated without exposure. No other aggravating or alleviating factors.        Review of Systems   Constitutional: Negative for malaise/fatigue and weight loss.   Eyes: Negative for discharge and redness.   Gastrointestinal: Negative for nausea and vomiting.   Musculoskeletal: Negative for joint pain and myalgias.   Skin: Negative for itching and rash.              Objective     /82   Pulse 100   Temp 36.9 °C (98.4 °F) (Temporal)   Resp 14   Ht 1.854 m (6' 1\")   Wt 68 kg (150 lb)   SpO2 92%   BMI 19.79 kg/m²      Physical Exam  Constitutional:       General: He is not in acute distress.     Appearance: He is well-developed.      Comments: Thin     HENT:      Head: Normocephalic and atraumatic.      Nose: Rhinorrhea present.   Eyes:      Conjunctiva/sclera: Conjunctivae normal.   Cardiovascular:      Rate and Rhythm: Normal rate and regular rhythm.      Heart sounds: Normal heart sounds. No murmur heard.     Pulmonary:      Effort: Pulmonary effort is normal.      Breath sounds: Normal breath sounds. No wheezing.   Skin:     General: Skin is warm and dry.      Findings: No rash.   Neurological:      Mental Status: He is alert and oriented to person, place, and time.                             Assessment & Plan         1. COPD exacerbation (HCC)  DX-CHEST-2 VIEWS    doxycycline (VIBRAMYCIN) 100 MG Tab    predniSONE (DELTASONE) 20 MG Tab   2. Cough  benzonatate (TESSALON) 200 MG capsule     Differential diagnosis, natural history, supportive care, and indications for immediate follow-up discussed at length.     Will f/u cxr                "

## 2021-10-11 ENCOUNTER — PATIENT MESSAGE (OUTPATIENT)
Dept: HEALTH INFORMATION MANAGEMENT | Facility: OTHER | Age: 71
End: 2021-10-11

## 2021-12-29 ENCOUNTER — APPOINTMENT (OUTPATIENT)
Dept: SLEEP MEDICINE | Facility: MEDICAL CENTER | Age: 71
End: 2021-12-29
Attending: INTERNAL MEDICINE
Payer: MEDICARE

## 2022-03-08 ENCOUNTER — PATIENT OUTREACH (OUTPATIENT)
Dept: HEALTH INFORMATION MANAGEMENT | Facility: OTHER | Age: 72
End: 2022-03-08
Payer: MEDICARE

## 2022-09-06 DIAGNOSIS — J44.1 CHRONIC OBSTRUCTIVE PULMONARY DISEASE WITH ACUTE EXACERBATION (HCC): ICD-10-CM

## 2022-09-06 NOTE — TELEPHONE ENCOUNTER
Have we ever prescribed this med? Yes.  If yes, what date? 07/22/21 Dr. Tam     Last OV: 07/22/21 Dr. Tam     Next OV: No pending follow up scheduled    DX: Chronic obstructive pulmonary disease with acute exacerbation    Medications: umeclidinium-vilanterol (ANORO ELLIPTA) 62.5-25 MCG/INH AEROSOL POWDER, BREATH ACTIVATED inhaler

## 2022-11-02 ENCOUNTER — PATIENT MESSAGE (OUTPATIENT)
Dept: HEALTH INFORMATION MANAGEMENT | Facility: OTHER | Age: 72
End: 2022-11-02

## 2022-11-28 ENCOUNTER — OFFICE VISIT (OUTPATIENT)
Dept: URGENT CARE | Facility: PHYSICIAN GROUP | Age: 72
End: 2022-11-28
Payer: COMMERCIAL

## 2022-11-28 VITALS
HEART RATE: 107 BPM | BODY MASS INDEX: 14.98 KG/M2 | RESPIRATION RATE: 14 BRPM | TEMPERATURE: 97.5 F | HEIGHT: 73 IN | SYSTOLIC BLOOD PRESSURE: 126 MMHG | DIASTOLIC BLOOD PRESSURE: 76 MMHG | WEIGHT: 113 LBS | OXYGEN SATURATION: 89 %

## 2022-11-28 DIAGNOSIS — J22 NOSOCOMIAL INFECTION OF LOWER RESPIRATORY TRACT: ICD-10-CM

## 2022-11-28 DIAGNOSIS — J44.1 COPD WITH ACUTE EXACERBATION (HCC): ICD-10-CM

## 2022-11-28 PROCEDURE — 99213 OFFICE O/P EST LOW 20 MIN: CPT | Mod: 25 | Performed by: NURSE PRACTITIONER

## 2022-11-28 PROCEDURE — 94640 AIRWAY INHALATION TREATMENT: CPT | Performed by: NURSE PRACTITIONER

## 2022-11-28 RX ORDER — PREDNISONE 10 MG/1
TABLET ORAL
Qty: 30 TABLET | Refills: 0 | Status: SHIPPED | OUTPATIENT
Start: 2022-11-28 | End: 2022-12-08

## 2022-11-28 RX ORDER — AMOXICILLIN AND CLAVULANATE POTASSIUM 875; 125 MG/1; MG/1
1 TABLET, FILM COATED ORAL 2 TIMES DAILY
Qty: 14 TABLET | Refills: 0 | Status: SHIPPED | OUTPATIENT
Start: 2022-11-28 | End: 2022-12-05

## 2022-11-28 RX ORDER — IPRATROPIUM BROMIDE AND ALBUTEROL SULFATE 2.5; .5 MG/3ML; MG/3ML
3 SOLUTION RESPIRATORY (INHALATION) ONCE
Status: COMPLETED | OUTPATIENT
Start: 2022-11-28 | End: 2022-11-28

## 2022-11-28 RX ADMIN — IPRATROPIUM BROMIDE AND ALBUTEROL SULFATE 3 ML: 2.5; .5 SOLUTION RESPIRATORY (INHALATION) at 09:36

## 2022-11-28 ASSESSMENT — ENCOUNTER SYMPTOMS
GASTROINTESTINAL NEGATIVE: 1
SPUTUM PRODUCTION: 1
WHEEZING: 1
FEVER: 0
ORTHOPNEA: 1
SHORTNESS OF BREATH: 1
RHINORRHEA: 1
CONSTITUTIONAL NEGATIVE: 1
EYES NEGATIVE: 1

## 2022-11-28 ASSESSMENT — FIBROSIS 4 INDEX: FIB4 SCORE: 1.46

## 2022-11-28 ASSESSMENT — COPD QUESTIONNAIRES: COPD: 1

## 2022-11-28 NOTE — PROGRESS NOTES
"Subjective:   Kobe Morris is a 72 y.o. male who presents for Shortness of Breath (COUGH, CONGESTION X 2-3 WKS. SKIN TURNING GREY, DIZZINESS. O2 HAS BEEN GOING TO 81% AND LOWER THIS WK. HAS BEEN HIT OR MISS IF THE NEBULIZER IS WORKING OR NOT. COUGH IS DEEP AND IN CHEST. )      Presents with his daughter.  She states that he has been extremely short of breath over the last couple of weeks, with dizziness, watery eyes, runny nose and productive sputum.  He denies fever or chills.  He does not have an O2 monitor at home, but that she believes he has been in the low 80s.  He does have history of COPD, and has a pulmonologist.  His oxygen in the office today is 89%.  Patient states that he has not been using his home nebulizers or inhalers lately.  He does endorse fatigue and constant coughing.    Shortness of Breath  This is a new problem. The current episode started 1 to 4 weeks ago (two weeks). The problem occurs constantly. The problem has been gradually worsening. Associated symptoms include orthopnea, rhinorrhea, sputum production and wheezing. Pertinent negatives include no fever. The symptoms are aggravated by any activity, smoke, odors and weather changes. Risk factors include smoking. He has tried beta agonist inhalers and ipratropium inhalers for the symptoms. The treatment provided mild relief. His past medical history is significant for COPD.     Review of Systems   Constitutional: Negative.  Negative for fever.   HENT:  Positive for congestion and rhinorrhea.    Eyes: Negative.    Respiratory:  Positive for sputum production, shortness of breath and wheezing.    Cardiovascular:  Positive for orthopnea.   Gastrointestinal: Negative.    Skin: Negative.      Medications, Allergies, and current problem list reviewed today in Epic.     Objective:     /76   Pulse (!) 107   Temp 36.4 °C (97.5 °F) (Temporal)   Resp 14   Ht 1.854 m (6' 1\")   Wt 51.3 kg (113 lb)   SpO2 89%     Physical Exam  Vitals " reviewed.   Constitutional:       Appearance: Normal appearance.   HENT:      Head: Normocephalic and atraumatic.      Nose: Congestion and rhinorrhea present.      Mouth/Throat:      Mouth: Mucous membranes are moist.      Pharynx: Oropharynx is clear. Posterior oropharyngeal erythema present.   Eyes:      Extraocular Movements: Extraocular movements intact.      Conjunctiva/sclera: Conjunctivae normal.      Pupils: Pupils are equal, round, and reactive to light.   Cardiovascular:      Rate and Rhythm: Regular rhythm. Tachycardia present.      Pulses: Normal pulses.      Heart sounds: Normal heart sounds.   Pulmonary:      Effort: Pulmonary effort is normal.      Breath sounds: Wheezing, rhonchi and rales present.   Abdominal:      General: Abdomen is flat. Bowel sounds are normal.      Palpations: Abdomen is soft.   Musculoskeletal:         General: Normal range of motion.      Cervical back: Normal range of motion and neck supple.   Skin:     General: Skin is warm and dry.      Capillary Refill: Capillary refill takes less than 2 seconds.   Neurological:      General: No focal deficit present.      Mental Status: He is alert and oriented to person, place, and time.   Psychiatric:         Mood and Affect: Mood normal.         Behavior: Behavior normal.       Assessment/Plan:     Diagnosis and associated orders:     1. COPD with acute exacerbation (HCC)  ipratropium-albuterol (DUONEB) nebulizer solution    amoxicillin-clavulanate (AUGMENTIN) 875-125 MG Tab    predniSONE (DELTASONE) 10 MG Tab      2. Nosocomial infection of lower respiratory tract  amoxicillin-clavulanate (AUGMENTIN) 875-125 MG Tab         Comments/MDM:     Patient was given a DuoNeb treatment in the office today.  His room air sat improved to 90-91%.  Patient reports he did feel improved with his breathing after this.  Discussed treatment with prednisone burst as well as Augmentin as this has been shown to improve outcomes with COPD exacerbation in  the lower respiratory tract.  Patient and daughter were advised about symptoms necessitating bringing patient to ER.  If patient's O2 level remains in the low 80s at home, this would be an indication to bring him to the ER.  Discussed with daughter that it is normal for patients with COPD to oxygenate in the 88% range.  If patient becomes more short of breath and is not improving, he should present to ER.  Patient well follow-up with his pulmonologist or his primary care next week.         Differential diagnosis, natural history, supportive care, and indications for immediate follow-up discussed.    Advised the patient to follow-up with the primary care physician for recheck, reevaluation, and consideration of further management.    Please note that this dictation was created using voice recognition software. I have made a reasonable attempt to correct obvious errors, but I expect that there are errors of grammar and possibly content that I did not discover before finalizing the note.    This note was electronically signed by TIERRA De Paz

## 2023-02-21 ENCOUNTER — NON-PROVIDER VISIT (OUTPATIENT)
Dept: MEDICAL GROUP | Facility: CLINIC | Age: 73
End: 2023-02-21
Payer: MEDICARE

## 2023-02-21 PROCEDURE — 36415 COLL VENOUS BLD VENIPUNCTURE: CPT | Performed by: PHYSICIAN ASSISTANT

## 2023-06-12 ENCOUNTER — NON-PROVIDER VISIT (OUTPATIENT)
Dept: MEDICAL GROUP | Facility: CLINIC | Age: 73
End: 2023-06-12
Payer: MEDICARE

## 2023-06-12 ENCOUNTER — HOSPITAL ENCOUNTER (OUTPATIENT)
Facility: MEDICAL CENTER | Age: 73
End: 2023-06-12
Attending: FAMILY MEDICINE
Payer: MEDICARE

## 2023-06-12 LAB
FORWARD REASON: SPWHY: NORMAL
FORWARDED TO LAB: SPWHR: NORMAL
SPECIMEN SENT: SPWT1: NORMAL

## 2023-06-12 PROCEDURE — 36415 COLL VENOUS BLD VENIPUNCTURE: CPT | Performed by: PHYSICIAN ASSISTANT

## 2023-10-30 ENCOUNTER — OFFICE VISIT (OUTPATIENT)
Dept: URGENT CARE | Facility: PHYSICIAN GROUP | Age: 73
End: 2023-10-30

## 2023-10-30 VITALS
HEIGHT: 73 IN | RESPIRATION RATE: 16 BRPM | HEART RATE: 106 BPM | DIASTOLIC BLOOD PRESSURE: 86 MMHG | OXYGEN SATURATION: 93 % | TEMPERATURE: 98.3 F | WEIGHT: 139 LBS | BODY MASS INDEX: 18.42 KG/M2 | SYSTOLIC BLOOD PRESSURE: 138 MMHG

## 2023-10-30 DIAGNOSIS — U07.1 COVID-19: ICD-10-CM

## 2023-10-30 DIAGNOSIS — J44.1 CHRONIC OBSTRUCTIVE PULMONARY DISEASE WITH ACUTE EXACERBATION (HCC): ICD-10-CM

## 2023-10-30 PROCEDURE — 99214 OFFICE O/P EST MOD 30 MIN: CPT | Performed by: NURSE PRACTITIONER

## 2023-10-30 RX ORDER — DEXAMETHASONE 6 MG/1
6 TABLET ORAL DAILY
Qty: 10 TABLET | Refills: 0 | Status: SHIPPED | OUTPATIENT
Start: 2023-10-30 | End: 2023-11-09

## 2023-10-30 RX ORDER — IPRATROPIUM BROMIDE AND ALBUTEROL SULFATE 2.5; .5 MG/3ML; MG/3ML
3 SOLUTION RESPIRATORY (INHALATION) 4 TIMES DAILY
Qty: 360 ML | Refills: 0 | Status: SHIPPED | OUTPATIENT
Start: 2023-10-30 | End: 2023-11-29

## 2023-10-31 ASSESSMENT — ENCOUNTER SYMPTOMS
MUSCULOSKELETAL NEGATIVE: 1
CHILLS: 1
FEVER: 0
COUGH: 1
SHORTNESS OF BREATH: 0
EYES NEGATIVE: 1
SWEATS: 1
SPUTUM PRODUCTION: 1
WEIGHT LOSS: 0
WHEEZING: 0
GASTROINTESTINAL NEGATIVE: 1
CARDIOVASCULAR NEGATIVE: 1

## 2023-10-31 ASSESSMENT — COPD QUESTIONNAIRES: COPD: 1

## 2023-11-01 NOTE — PROGRESS NOTES
"Subjective:   Kobe Morris is a 73 y.o. male who presents for Coronavirus Screening (Pt. Tested Positive for Covid this am.  Her daughter also had a Positive Covid. He is having cough and congestion for 3-4 months, chills. Hx COPD)      Cough  This is a new problem. Episode onset: Coughing x 3-4 months, worsening - +Covid this morning - daughter is +Covid as well. The problem has been gradually worsening. The problem occurs constantly. The cough is Productive of sputum. Associated symptoms include chills and sweats. Pertinent negatives include no ear congestion, fever, nasal congestion, shortness of breath, weight loss or wheezing. Risk factors for lung disease include smoking/tobacco exposure. He has tried OTC cough suppressant, prescription cough suppressant and a beta-agonist inhaler for the symptoms. The treatment provided mild relief. His past medical history is significant for COPD and emphysema. There is no history of environmental allergies.       Review of Systems   Constitutional:  Positive for chills. Negative for fever and weight loss.   HENT: Negative.     Eyes: Negative.    Respiratory:  Positive for cough and sputum production. Negative for shortness of breath and wheezing.    Cardiovascular: Negative.    Gastrointestinal: Negative.    Genitourinary: Negative.    Musculoskeletal: Negative.    Skin: Negative.    Endo/Heme/Allergies:  Negative for environmental allergies.       Medications, Allergies, and current problem list reviewed today in Epic.     Objective:     /86   Pulse (!) 106   Temp 36.8 °C (98.3 °F) (Temporal)   Resp 16   Ht 1.854 m (6' 1\")   Wt 63 kg (139 lb)   SpO2 93%     Physical Exam  Vitals and nursing note reviewed.   Constitutional:       General: He is not in acute distress.     Appearance: Normal appearance. He is not ill-appearing.   HENT:      Head: Normocephalic and atraumatic.      Right Ear: Tympanic membrane, ear canal and external ear normal.      Left Ear: " Tympanic membrane, ear canal and external ear normal.      Nose: Nose normal. No congestion or rhinorrhea.      Mouth/Throat:      Mouth: Mucous membranes are moist.      Pharynx: Oropharynx is clear. No posterior oropharyngeal erythema.   Eyes:      Extraocular Movements: Extraocular movements intact.      Conjunctiva/sclera: Conjunctivae normal.      Pupils: Pupils are equal, round, and reactive to light.   Cardiovascular:      Rate and Rhythm: Normal rate and regular rhythm.      Pulses: Normal pulses.      Heart sounds: Normal heart sounds.   Pulmonary:      Effort: Pulmonary effort is normal.      Breath sounds: Decreased air movement present. Examination of the right-upper field reveals decreased breath sounds and wheezing. Examination of the left-upper field reveals decreased breath sounds and wheezing. Examination of the left-middle field reveals decreased breath sounds. Decreased breath sounds and wheezing present. No rhonchi or rales.   Abdominal:      General: Abdomen is flat. Bowel sounds are normal.      Palpations: Abdomen is soft.   Musculoskeletal:         General: Normal range of motion.      Cervical back: Normal range of motion and neck supple. No tenderness.   Lymphadenopathy:      Cervical: No cervical adenopathy.   Skin:     General: Skin is warm and dry.      Capillary Refill: Capillary refill takes less than 2 seconds.   Neurological:      General: No focal deficit present.      Mental Status: He is alert and oriented to person, place, and time.   Psychiatric:         Mood and Affect: Mood normal.         Behavior: Behavior normal.         Assessment/Plan:     Diagnosis and associated orders:     1. COVID-19  Nirmatrelvir&Ritonavir 300/100 20 x 150 MG & 10 x 100MG Tablet Therapy Pack    ipratropium-albuterol (DUONEB) 0.5-2.5 (3) MG/3ML nebulizer solution    dexamethasone (DECADRON) 6 MG Tab      2. Chronic obstructive pulmonary disease with acute exacerbation (HCC)  ipratropium-albuterol  (DUONEB) 0.5-2.5 (3) MG/3ML nebulizer solution    dexamethasone (DECADRON) 6 MG Tab         Comments/MDM:     Patient found to be positive today at home via home testing.    Discussed with patient red flag signs, encouraged use of a pulse oximeter, especially if unvaccinated and consider ER presentation if pulse oxygenation dips below 90%  At this point this patient is hemodynamically stable and eligible for outpatient therapy  Discussed supportive care measures, contagiousness, CDC guidelines  Patient demonstrated clear verbal understanding my instructions and understood the gravity of the situation as well as the need to monitor symptoms and treat this seriously  After the patient was found to be Covid positive on the rapid antigen test I donned my appropriate PPE and had a prolonged conversation with the patient of the above situation.            Differential diagnosis, natural history, supportive care, and indications for immediate follow-up discussed.    Advised the patient to follow-up with the primary care physician for recheck, reevaluation, and consideration of further management.    Please note that this dictation was created using voice recognition software. I have made a reasonable attempt to correct obvious errors, but I expect that there are errors of grammar and possibly content that I did not discover before finalizing the note.    This note was electronically signed by TIERRA De Paz

## 2024-02-12 ENCOUNTER — APPOINTMENT (OUTPATIENT)
Dept: MEDICAL GROUP | Facility: CLINIC | Age: 74
End: 2024-02-12
Payer: MEDICARE

## 2025-06-23 ENCOUNTER — PHARMACY VISIT (OUTPATIENT)
Dept: PHARMACY | Facility: MEDICAL CENTER | Age: 75
End: 2025-06-23
Payer: COMMERCIAL

## 2025-06-23 PROCEDURE — RXMED WILLOW AMBULATORY MEDICATION CHARGE: Performed by: INTERNAL MEDICINE

## 2025-06-23 RX ORDER — AMOXICILLIN 250 MG
1 CAPSULE ORAL
Qty: 15 TABLET | Refills: 0 | OUTPATIENT
Start: 2025-06-23

## 2025-06-23 RX ORDER — LORAZEPAM 2 MG/ML
CONCENTRATE ORAL
Qty: 30 ML | Refills: 0 | OUTPATIENT
Start: 2025-06-23

## 2025-06-23 RX ORDER — MORPHINE SULFATE 20 MG/ML
SOLUTION ORAL
Qty: 30 ML | Refills: 0 | OUTPATIENT
Start: 2025-06-23

## 2025-06-23 RX ORDER — HALOPERIDOL 2 MG/ML
SOLUTION ORAL
Qty: 30 ML | Refills: 0 | OUTPATIENT
Start: 2025-06-23

## 2025-06-23 RX ORDER — ONDANSETRON 4 MG/1
TABLET, FILM COATED ORAL
Qty: 30 TABLET | Refills: 0 | OUTPATIENT
Start: 2025-06-23